# Patient Record
Sex: FEMALE | Race: WHITE | Employment: UNEMPLOYED | URBAN - METROPOLITAN AREA
[De-identification: names, ages, dates, MRNs, and addresses within clinical notes are randomized per-mention and may not be internally consistent; named-entity substitution may affect disease eponyms.]

---

## 2017-01-17 PROBLEM — O40.3XX0 POLYHYDRAMNIOS IN SINGLETON PREGNANCY IN THIRD TRIMESTER: Status: ACTIVE | Noted: 2017-01-17

## 2017-02-07 PROBLEM — O40.3XX0 POLYHYDRAMNIOS IN SINGLETON PREGNANCY IN THIRD TRIMESTER: Status: RESOLVED | Noted: 2017-01-17 | Resolved: 2017-02-07

## 2017-02-12 PROBLEM — Z37.9 NORMAL LABOR: Status: ACTIVE | Noted: 2017-02-12

## 2017-02-13 ENCOUNTER — ANESTHESIA EVENT (OUTPATIENT)
Dept: LABOR AND DELIVERY | Age: 27
End: 2017-02-13
Payer: COMMERCIAL

## 2017-02-13 ENCOUNTER — ANESTHESIA (OUTPATIENT)
Dept: LABOR AND DELIVERY | Age: 27
End: 2017-02-13
Payer: COMMERCIAL

## 2017-02-13 ENCOUNTER — SURGERY (OUTPATIENT)
Age: 27
End: 2017-02-13

## 2017-02-13 PROCEDURE — 74011250636 HC RX REV CODE- 250/636: Performed by: OBSTETRICS & GYNECOLOGY

## 2017-02-13 PROCEDURE — 74011250636 HC RX REV CODE- 250/636

## 2017-02-13 PROCEDURE — 74011000250 HC RX REV CODE- 250

## 2017-02-13 PROCEDURE — A4300 CATH IMPL VASC ACCESS PORTAL: HCPCS | Performed by: ANESTHESIOLOGY

## 2017-02-13 PROCEDURE — 77030014125 HC TY EPDRL BBMI -B: Performed by: ANESTHESIOLOGY

## 2017-02-13 RX ORDER — ONDANSETRON 2 MG/ML
INJECTION INTRAMUSCULAR; INTRAVENOUS AS NEEDED
Status: DISCONTINUED | OUTPATIENT
Start: 2017-02-13 | End: 2017-02-13 | Stop reason: HOSPADM

## 2017-02-13 RX ORDER — SODIUM BICARBONATE 1 MEQ/ML
SYRINGE (ML) INTRAVENOUS AS NEEDED
Status: DISCONTINUED | OUTPATIENT
Start: 2017-02-13 | End: 2017-02-13 | Stop reason: HOSPADM

## 2017-02-13 RX ORDER — LIDOCAINE HYDROCHLORIDE AND EPINEPHRINE 15; 5 MG/ML; UG/ML
INJECTION, SOLUTION EPIDURAL AS NEEDED
Status: DISCONTINUED | OUTPATIENT
Start: 2017-02-13 | End: 2017-02-13 | Stop reason: HOSPADM

## 2017-02-13 RX ORDER — ROPIVACAINE HYDROCHLORIDE 2 MG/ML
INJECTION, SOLUTION EPIDURAL; INFILTRATION; PERINEURAL
Status: DISCONTINUED | OUTPATIENT
Start: 2017-02-13 | End: 2017-02-13 | Stop reason: HOSPADM

## 2017-02-13 RX ORDER — MORPHINE SULFATE 0.5 MG/ML
INJECTION, SOLUTION EPIDURAL; INTRATHECAL; INTRAVENOUS AS NEEDED
Status: DISCONTINUED | OUTPATIENT
Start: 2017-02-13 | End: 2017-02-13 | Stop reason: HOSPADM

## 2017-02-13 RX ORDER — KETOROLAC TROMETHAMINE 30 MG/ML
INJECTION, SOLUTION INTRAMUSCULAR; INTRAVENOUS AS NEEDED
Status: DISCONTINUED | OUTPATIENT
Start: 2017-02-13 | End: 2017-02-13 | Stop reason: HOSPADM

## 2017-02-13 RX ORDER — ROPIVACAINE HYDROCHLORIDE 2 MG/ML
INJECTION, SOLUTION EPIDURAL; INFILTRATION; PERINEURAL AS NEEDED
Status: DISCONTINUED | OUTPATIENT
Start: 2017-02-13 | End: 2017-02-13 | Stop reason: HOSPADM

## 2017-02-13 RX ORDER — LIDOCAINE HYDROCHLORIDE AND EPINEPHRINE 20; 5 MG/ML; UG/ML
INJECTION, SOLUTION EPIDURAL; INFILTRATION; INTRACAUDAL; PERINEURAL AS NEEDED
Status: DISCONTINUED | OUTPATIENT
Start: 2017-02-13 | End: 2017-02-13 | Stop reason: HOSPADM

## 2017-02-13 RX ADMIN — CEFAZOLIN 2 G: 1 INJECTION, POWDER, FOR SOLUTION INTRAMUSCULAR; INTRAVENOUS; PARENTERAL at 19:41

## 2017-02-13 RX ADMIN — ROPIVACAINE HYDROCHLORIDE 10 ML/HR: 2 INJECTION, SOLUTION EPIDURAL; INFILTRATION; PERINEURAL at 11:15

## 2017-02-13 RX ADMIN — LIDOCAINE HYDROCHLORIDE AND EPINEPHRINE 3 ML: 15; 5 INJECTION, SOLUTION EPIDURAL at 11:07

## 2017-02-13 RX ADMIN — ONDANSETRON 4 MG: 2 INJECTION INTRAMUSCULAR; INTRAVENOUS at 20:01

## 2017-02-13 RX ADMIN — ROPIVACAINE HYDROCHLORIDE 4 ML: 2 INJECTION, SOLUTION EPIDURAL; INFILTRATION; PERINEURAL at 11:12

## 2017-02-13 RX ADMIN — KETOROLAC TROMETHAMINE 30 MG: 30 INJECTION, SOLUTION INTRAMUSCULAR; INTRAVENOUS at 20:15

## 2017-02-13 RX ADMIN — MORPHINE SULFATE 4 MG: 0.5 INJECTION, SOLUTION EPIDURAL; INTRATHECAL; INTRAVENOUS at 20:31

## 2017-02-13 RX ADMIN — Medication 2 MEQ: at 19:41

## 2017-02-13 RX ADMIN — OXYTOCIN 500 ML/HR: 10 INJECTION, SOLUTION INTRAMUSCULAR; INTRAVENOUS at 20:01

## 2017-02-13 RX ADMIN — LIDOCAINE HYDROCHLORIDE AND EPINEPHRINE 20 ML: 20; 5 INJECTION, SOLUTION EPIDURAL; INFILTRATION; INTRACAUDAL; PERINEURAL at 19:41

## 2017-02-13 RX ADMIN — ROPIVACAINE HYDROCHLORIDE 6 ML: 2 INJECTION, SOLUTION EPIDURAL; INFILTRATION; PERINEURAL at 11:09

## 2017-02-13 NOTE — ANESTHESIA PROCEDURE NOTES
Epidural Block    Start time: 2/13/2017 11:02 AM  End time: 2/13/2017 11:07 AM  Performed by: Cecy Romano  Authorized by: Ramsey MACIAS     Pre-Procedure  Indication: labor epidural    Preanesthetic Checklist: patient identified, risks and benefits discussed, anesthesia consent, site marked, patient being monitored, timeout performed and anesthesia consent    Timeout Time: 11:00        Epidural:   Patient position:  Seated  Prep region:  Lumbar  Prep: Patient draped and Chlorhexidine    Location:  L2-3    Needle and Epidural Catheter:   Needle Type:  Tuohy  Needle Gauge:  17 G  Injection Technique:  Loss of resistance using air  Attempts:  1  Catheter Size:  19 G  Catheter at Skin Depth (cm):  10  Depth in Epidural Space (cm):  5  Events: no blood with aspiration, no cerebrospinal fluid with aspiration, no paresthesia and negative aspiration test    Test Dose:  Lidocaine 1.5% w/ epi and negative    Assessment:   Catheter Secured:  Tegaderm and tape  Insertion:  Uncomplicated  Patient tolerance:  Patient tolerated the procedure well with no immediate complications

## 2017-02-13 NOTE — ANESTHESIA PREPROCEDURE EVALUATION
Anesthetic History   No history of anesthetic complications            Review of Systems / Medical History  Patient summary reviewed and pertinent labs reviewed    Pulmonary  Within defined limits                 Neuro/Psych   Within defined limits           Cardiovascular                  Exercise tolerance: >4 METS     GI/Hepatic/Renal     GERD           Endo/Other    Diabetes: using insulin         Other Findings   Comments: Intrauterine pregnancy         Physical Exam    Airway  Mallampati: II  TM Distance: < 4 cm  Neck ROM: short neck   Mouth opening: Normal     Cardiovascular    Rhythm: regular  Rate: normal         Dental  No notable dental hx       Pulmonary  Breath sounds clear to auscultation               Abdominal         Other Findings            Anesthetic Plan    ASA: 2, emergent  Anesthesia type: epidural            Anesthetic plan and risks discussed with: Patient and Spouse      Arrest of descent

## 2017-02-14 NOTE — ANESTHESIA POSTPROCEDURE EVALUATION
Post-Anesthesia Evaluation and Assessment    Patient: Sixto Villalobos MRN: [id number]  SSN: [id number]    YOB: 1990  Age: 32 y.o. Sex: female       Cardiovascular Function/Vital Signs  Visit Vitals    /64 (BP 1 Location: Right arm, BP Patient Position: At rest)    Pulse (!) 101    Temp 37.7 °C (99.8 °F)    Resp 16    Wt 92.1 kg (203 lb)    SpO2 93%    Breastfeeding Yes    BMI 31.79 kg/m2       Patient is status post epidural anesthesia for Procedure(s):   SECTION. Nausea/Vomiting: None    Postoperative hydration reviewed and adequate. Pain:  Pain Scale 1: Numeric (0 - 10) (17)  Pain Intensity 1: 3 (17)   Managed    Neurological Status:   Neuro (WDL): Exceptions to WDL (17)  Neuro  Neurologic State: Alert; Appropriate for age (17)  Orientation Level: Oriented X4 (17)  Cognition: Appropriate decision making; Appropriate for age attention/concentration; Appropriate safety awareness (17)  Speech: Clear (17)  LUE Motor Response: Purposeful (17)  LLE Motor Response: Tingling (17)  RUE Motor Response: Purposeful (17)  RLE Motor Response: Tingling (17)   Block resolving    Mental Status and Level of Consciousness: Alert and oriented     Pulmonary Status:   O2 Device: Room air (17)   Adequate oxygenation and airway patent    Complications related to anesthesia: None    Post-anesthesia assessment completed.  No concerns    Signed By: Chuy Brunson MD     2017

## 2022-03-19 PROBLEM — O40.3XX0 POLYHYDRAMNIOS IN THIRD TRIMESTER: Status: ACTIVE | Noted: 2017-01-17

## 2022-04-20 PROBLEM — O40.3XX0 POLYHYDRAMNIOS IN THIRD TRIMESTER: Status: RESOLVED | Noted: 2017-01-17 | Resolved: 2022-04-20

## 2022-05-03 ENCOUNTER — HOSPITAL ENCOUNTER (EMERGENCY)
Age: 32
Discharge: HOME | End: 2022-05-03
Attending: EMERGENCY MEDICINE
Payer: COMMERCIAL

## 2022-05-03 ENCOUNTER — APPOINTMENT (OUTPATIENT)
Dept: CT IMAGING | Age: 32
End: 2022-05-03
Attending: EMERGENCY MEDICINE
Payer: COMMERCIAL

## 2022-05-03 VITALS
BODY MASS INDEX: 34.67 KG/M2 | RESPIRATION RATE: 16 BRPM | OXYGEN SATURATION: 96 % | TEMPERATURE: 98.1 F | DIASTOLIC BLOOD PRESSURE: 59 MMHG | SYSTOLIC BLOOD PRESSURE: 121 MMHG | HEART RATE: 76 BPM | HEIGHT: 67 IN | WEIGHT: 220.9 LBS

## 2022-05-03 DIAGNOSIS — N23 RENAL COLIC: Primary | ICD-10-CM

## 2022-05-03 DIAGNOSIS — N13.9 ACUTE UNILATERAL OBSTRUCTIVE UROPATHY: ICD-10-CM

## 2022-05-03 LAB
ALBUMIN SERPL-MCNC: 3.7 G/DL (ref 3.5–5)
ALBUMIN/GLOB SERPL: 0.9 {RATIO} (ref 1.2–3.5)
ALP SERPL-CCNC: 74 U/L (ref 50–136)
ALT SERPL-CCNC: 32 U/L (ref 12–65)
ANION GAP SERPL CALC-SCNC: 10 MMOL/L (ref 7–16)
AST SERPL-CCNC: 14 U/L (ref 15–37)
BACTERIA URNS QL MICRO: 0 /HPF
BASOPHILS # BLD: 0.1 K/UL (ref 0–0.2)
BASOPHILS NFR BLD: 1 % (ref 0–2)
BILIRUB SERPL-MCNC: 0.4 MG/DL (ref 0.2–1.1)
BUN SERPL-MCNC: 19 MG/DL (ref 6–23)
CALCIUM SERPL-MCNC: 10 MG/DL (ref 8.3–10.4)
CASTS URNS QL MICRO: 0 /LPF
CHLORIDE SERPL-SCNC: 108 MMOL/L (ref 98–107)
CO2 SERPL-SCNC: 23 MMOL/L (ref 21–32)
CREAT SERPL-MCNC: 0.93 MG/DL (ref 0.6–1)
CRYSTALS URNS QL MICRO: 0 /LPF
DIFFERENTIAL METHOD BLD: ABNORMAL
EOSINOPHIL # BLD: 0.2 K/UL (ref 0–0.8)
EOSINOPHIL NFR BLD: 2 % (ref 0.5–7.8)
EPI CELLS #/AREA URNS HPF: NORMAL /HPF
ERYTHROCYTE [DISTWIDTH] IN BLOOD BY AUTOMATED COUNT: 12.6 % (ref 11.9–14.6)
GLOBULIN SER CALC-MCNC: 4 G/DL (ref 2.3–3.5)
GLUCOSE SERPL-MCNC: 153 MG/DL (ref 65–100)
HCG UR QL: NEGATIVE
HCT VFR BLD AUTO: 41.5 % (ref 35.8–46.3)
HGB BLD-MCNC: 14 G/DL (ref 11.7–15.4)
IMM GRANULOCYTES # BLD AUTO: 0 K/UL (ref 0–0.5)
IMM GRANULOCYTES NFR BLD AUTO: 0 % (ref 0–5)
LIPASE SERPL-CCNC: 110 U/L (ref 73–393)
LYMPHOCYTES # BLD: 1.5 K/UL (ref 0.5–4.6)
LYMPHOCYTES NFR BLD: 14 % (ref 13–44)
MCH RBC QN AUTO: 28.7 PG (ref 26.1–32.9)
MCHC RBC AUTO-ENTMCNC: 33.7 G/DL (ref 31.4–35)
MCV RBC AUTO: 85.2 FL (ref 79.6–97.8)
MONOCYTES # BLD: 0.7 K/UL (ref 0.1–1.3)
MONOCYTES NFR BLD: 7 % (ref 4–12)
MUCOUS THREADS URNS QL MICRO: 0 /LPF
NEUTS SEG # BLD: 8.3 K/UL (ref 1.7–8.2)
NEUTS SEG NFR BLD: 77 % (ref 43–78)
NRBC # BLD: 0 K/UL (ref 0–0.2)
PLATELET # BLD AUTO: 238 K/UL (ref 150–450)
PMV BLD AUTO: 11.6 FL (ref 9.4–12.3)
POTASSIUM SERPL-SCNC: 3.7 MMOL/L (ref 3.5–5.1)
PROT SERPL-MCNC: 7.7 G/DL (ref 6.3–8.2)
RBC # BLD AUTO: 4.87 M/UL (ref 4.05–5.2)
RBC #/AREA URNS HPF: NORMAL /HPF
SODIUM SERPL-SCNC: 141 MMOL/L (ref 136–145)
WBC # BLD AUTO: 10.8 K/UL (ref 4.3–11.1)
WBC URNS QL MICRO: NORMAL /HPF

## 2022-05-03 PROCEDURE — 83690 ASSAY OF LIPASE: CPT

## 2022-05-03 PROCEDURE — 96375 TX/PRO/DX INJ NEW DRUG ADDON: CPT

## 2022-05-03 PROCEDURE — 74011250637 HC RX REV CODE- 250/637: Performed by: EMERGENCY MEDICINE

## 2022-05-03 PROCEDURE — 81015 MICROSCOPIC EXAM OF URINE: CPT

## 2022-05-03 PROCEDURE — 81003 URINALYSIS AUTO W/O SCOPE: CPT

## 2022-05-03 PROCEDURE — 81025 URINE PREGNANCY TEST: CPT

## 2022-05-03 PROCEDURE — 74011250636 HC RX REV CODE- 250/636: Performed by: EMERGENCY MEDICINE

## 2022-05-03 PROCEDURE — 96374 THER/PROPH/DIAG INJ IV PUSH: CPT

## 2022-05-03 PROCEDURE — 85025 COMPLETE CBC W/AUTO DIFF WBC: CPT

## 2022-05-03 PROCEDURE — 74176 CT ABD & PELVIS W/O CONTRAST: CPT

## 2022-05-03 PROCEDURE — 99284 EMERGENCY DEPT VISIT MOD MDM: CPT

## 2022-05-03 PROCEDURE — 80053 COMPREHEN METABOLIC PANEL: CPT

## 2022-05-03 RX ORDER — TAMSULOSIN HYDROCHLORIDE 0.4 MG/1
0.4 CAPSULE ORAL
Status: COMPLETED | OUTPATIENT
Start: 2022-05-03 | End: 2022-05-03

## 2022-05-03 RX ORDER — KETOROLAC TROMETHAMINE 30 MG/ML
30 INJECTION, SOLUTION INTRAMUSCULAR; INTRAVENOUS
Status: COMPLETED | OUTPATIENT
Start: 2022-05-03 | End: 2022-05-03

## 2022-05-03 RX ORDER — SODIUM CHLORIDE 0.9 % (FLUSH) 0.9 %
5-10 SYRINGE (ML) INJECTION AS NEEDED
Status: DISCONTINUED | OUTPATIENT
Start: 2022-05-03 | End: 2022-05-03 | Stop reason: HOSPADM

## 2022-05-03 RX ORDER — SODIUM CHLORIDE 0.9 % (FLUSH) 0.9 %
5-10 SYRINGE (ML) INJECTION EVERY 8 HOURS
Status: DISCONTINUED | OUTPATIENT
Start: 2022-05-03 | End: 2022-05-03 | Stop reason: HOSPADM

## 2022-05-03 RX ORDER — TAMSULOSIN HYDROCHLORIDE 0.4 MG/1
0.4 CAPSULE ORAL DAILY
Qty: 15 CAPSULE | Refills: 0 | Status: SHIPPED | OUTPATIENT
Start: 2022-05-03 | End: 2022-05-19

## 2022-05-03 RX ORDER — ONDANSETRON HYDROCHLORIDE 8 MG/1
8 TABLET, FILM COATED ORAL
Qty: 20 TABLET | Refills: 0 | Status: SHIPPED | OUTPATIENT
Start: 2022-05-03

## 2022-05-03 RX ORDER — KETOROLAC TROMETHAMINE 10 MG/1
10 TABLET, FILM COATED ORAL
Qty: 30 TABLET | Refills: 0 | Status: SHIPPED | OUTPATIENT
Start: 2022-05-03 | End: 2022-05-19

## 2022-05-03 RX ORDER — ONDANSETRON 2 MG/ML
8 INJECTION INTRAMUSCULAR; INTRAVENOUS
Status: COMPLETED | OUTPATIENT
Start: 2022-05-03 | End: 2022-05-03

## 2022-05-03 RX ADMIN — KETOROLAC TROMETHAMINE 30 MG: 30 INJECTION, SOLUTION INTRAMUSCULAR at 06:00

## 2022-05-03 RX ADMIN — SODIUM CHLORIDE 1000 ML: 900 INJECTION, SOLUTION INTRAVENOUS at 06:00

## 2022-05-03 RX ADMIN — TAMSULOSIN HYDROCHLORIDE 0.4 MG: 0.4 CAPSULE ORAL at 06:31

## 2022-05-03 RX ADMIN — ONDANSETRON 8 MG: 2 INJECTION INTRAMUSCULAR; INTRAVENOUS at 06:04

## 2022-05-03 NOTE — DISCHARGE INSTRUCTIONS
Follow-up with urology. Call to set up appointment. Return to the emergency department for fevers, uncontrolled pain, uncontrolled vomiting or any other concerns.

## 2022-05-03 NOTE — ED NOTES
I have reviewed discharge instructions with the patient. The patient verbalized understanding. Patient left ED via Discharge Method: ambulatory to Home with family. Opportunity for questions and clarification provided. Patient given 3 scripts. To continue your aftercare when you leave the hospital, you may receive an automated call from our care team to check in on how you are doing. This is a free service and part of our promise to provide the best care and service to meet your aftercare needs.  If you have questions, or wish to unsubscribe from this service please call [phone]. Thank you for Choosing our [facility] Emergency Department.

## 2022-05-03 NOTE — ED TRIAGE NOTES
Pt states that she woke up tonight with left flank pain. States that she was recently treated for a UTI. Also, c/o nausea nand vomiting.   Masked on arrival

## 2022-05-03 NOTE — ED PROVIDER NOTES
Zoë JoinerMarroquincoy Paredes is a 28 y.o. female seen on 5/3/2022 in the [address] [place] [address] in room ER02/02. Chief Complaint   Patient presents with    Flank Pain     HPI: 35-year-old  female presented to the emergency department with complaints of left flank pain, nausea and vomiting that awoke her from her sleep in the middle of the night. Patient states that she has not had pain like this before. Patient denies any urinary symptoms however states that she was treated for urinary tract infection about 3 weeks ago. She denies any fevers, chills, chest pain, shortness of breath, ranges in bowel habits or any other concerns. Patient states that there is nothing that makes the pain worse or better. She states that she cannot find a position of comfort. patient states that she felt fine prior to going to bed. .    Historian: Patient     REVIEW OF SYSTEMS     Review of Systems   Constitutional: Negative. HENT: Negative. Respiratory: Negative. Cardiovascular: Negative. Gastrointestinal: Positive for abdominal pain, nausea and vomiting. Genitourinary: Positive for flank pain. Skin: Negative. Neurological: Negative. Psychiatric/Behavioral: Negative. All other systems reviewed and are negative.       PAST MEDICAL HISTORY     Past Medical History:   Diagnosis Date    Anemia 2009    in pregnancy    History of gestational diabetes 2016    Infertility associated with anovulation     Irregular menstrual bleeding     PCOS (polycystic ovarian syndrome)     Type 2 diabetes mellitus (HCC)      Past Surgical History:   Procedure Laterality Date    HX  SECTION      HX ORTHOPAEDIC Left     Knee     Social History     Socioeconomic History    Marital status: SINGLE   Tobacco Use    Smoking status: Never Smoker    Smokeless tobacco: Never Used   Vaping Use    Vaping Use: Never used   Substance and Sexual Activity    Alcohol use: No    Drug use: No    Sexual activity: Yes     Partners: Male     Birth control/protection: None     Prior to Admission Medications   Prescriptions Last Dose Informant Patient Reported? Taking? Blood-Glucose Meter monitoring kit   No No   Sig: Check blood sugar fasting before breakfast then 1 hour after the first bite of each meal (breakfast,lunch,dinner)   glucose blood VI test strips (blood glucose test) strip   No No   Sig: Check blood sugar fasting before breakfast then 1 hour after the first bite of each meal (breakfast,lunch,dinner)   lancets misc   No No   Sig: Check blood sugar fasting before breakfast then 1 hour after the first bite of each meal (breakfast,lunch,dinner)   metFORMIN ER (GLUCOPHAGE XR) 500 mg tablet   No No   Sig: Take 1 Tablet by mouth daily (with dinner). Facility-Administered Medications: None     Allergies   Allergen Reactions    Sulfa (Sulfonamide Antibiotics) Rash        PHYSICAL EXAM       Vitals:    05/03/22 0524 05/03/22 0529   BP: 121/80 126/77   Pulse:  74   Resp:  16   Temp:  98.1 °F (36.7 °C)   SpO2:  98%    Vital signs were reviewed. Physical Exam  Vitals and nursing note reviewed. Constitutional:       General: She is in acute distress (Obvious discomfort). Appearance: Normal appearance. HENT:      Head: Normocephalic and atraumatic. Mouth/Throat:      Mouth: Mucous membranes are moist.   Eyes:      Extraocular Movements: Extraocular movements intact. Pupils: Pupils are equal, round, and reactive to light. Cardiovascular:      Rate and Rhythm: Normal rate and regular rhythm. Pulses: Normal pulses. Heart sounds: Normal heart sounds. Pulmonary:      Effort: Pulmonary effort is normal.      Breath sounds: Normal breath sounds. Abdominal:      Palpations: Abdomen is soft. Tenderness: There is abdominal tenderness (Mild left lower quadrant tenderness to palpation). There is left CVA tenderness.    Musculoskeletal:         General: Normal range of motion. Cervical back: Normal range of motion. Skin:     General: Skin is warm and dry. Neurological:      General: No focal deficit present. Mental Status: She is alert and oriented to person, place, and time. Mental status is at baseline. Psychiatric:         Mood and Affect: Mood normal.         Behavior: Behavior normal.         Thought Content:  Thought content normal.         Judgment: Judgment normal.          MEDICAL DECISION MAKING     ED Course:    Orders Placed This Encounter    CT ABD/PEL FOR RENAL STONE    CBC with Diff    CMP    Lipase    Urine Micro (Check if Urine Dip is Positive)    DIET NPO    POC Urine Dipstick    POC Urine Preg (Check for Females 10 - 55)    HCG URINE, QL. - POC    SALINE LOCK IV ONE TIME Routine    sodium chloride (NS) flush 5-10 mL    sodium chloride (NS) flush 5-10 mL    sodium chloride 0.9 % bolus infusion 1,000 mL    ondansetron (ZOFRAN) injection 8 mg    ketorolac (TORADOL) injection 30 mg    tamsulosin (FLOMAX) capsule 0.4 mg    ketorolac (TORADOL) 10 mg tablet    ondansetron hcl (Zofran) 8 mg tablet    tamsulosin (Flomax) 0.4 mg capsule     Recent Results (from the past 8 hour(s))   HCG URINE, QL. - POC    Collection Time: 05/03/22  5:16 AM   Result Value Ref Range    Pregnancy test,urine (POC) Negative NEG     URINE MICROSCOPIC    Collection Time: 05/03/22  5:18 AM   Result Value Ref Range    WBC 3-5 0 /hpf    RBC 3-5 0 /hpf    Epithelial cells 3-5 0 /hpf    Bacteria 0 0 /hpf    Casts 0 0 /lpf    Crystals, urine 0 0 /LPF    Mucus 0 0 /lpf   CBC WITH AUTOMATED DIFF    Collection Time: 05/03/22  5:58 AM   Result Value Ref Range    WBC 10.8 4.3 - 11.1 K/uL    RBC 4.87 4.05 - 5.2 M/uL    HGB 14.0 11.7 - 15.4 g/dL    HCT 41.5 35.8 - 46.3 %    MCV 85.2 79.6 - 97.8 FL    MCH 28.7 26.1 - 32.9 PG    MCHC 33.7 31.4 - 35.0 g/dL    RDW 12.6 11.9 - 14.6 %    PLATELET 992 624 - 479 K/uL    MPV 11.6 9.4 - 12.3 FL    ABSOLUTE NRBC 0.00 0.0 - 0.2 K/uL    DF AUTOMATED      NEUTROPHILS 77 43 - 78 %    LYMPHOCYTES 14 13 - 44 %    MONOCYTES 7 4.0 - 12.0 %    EOSINOPHILS 2 0.5 - 7.8 %    BASOPHILS 1 0.0 - 2.0 %    IMMATURE GRANULOCYTES 0 0.0 - 5.0 %    ABS. NEUTROPHILS 8.3 (H) 1.7 - 8.2 K/UL    ABS. LYMPHOCYTES 1.5 0.5 - 4.6 K/UL    ABS. MONOCYTES 0.7 0.1 - 1.3 K/UL    ABS. EOSINOPHILS 0.2 0.0 - 0.8 K/UL    ABS. BASOPHILS 0.1 0.0 - 0.2 K/UL    ABS. IMM. GRANS. 0.0 0.0 - 0.5 K/UL   METABOLIC PANEL, COMPREHENSIVE    Collection Time: 05/03/22  5:58 AM   Result Value Ref Range    Sodium 141 136 - 145 mmol/L    Potassium 3.7 3.5 - 5.1 mmol/L    Chloride 108 (H) 98 - 107 mmol/L    CO2 23 21 - 32 mmol/L    Anion gap 10 7 - 16 mmol/L    Glucose 153 (H) 65 - 100 mg/dL    BUN 19 6 - 23 MG/DL    Creatinine 0.93 0.6 - 1.0 MG/DL    GFR est AA >60 >60 ml/min/1.73m2    GFR est non-AA >60 >60 ml/min/1.73m2    Calcium 10.0 8.3 - 10.4 MG/DL    Bilirubin, total 0.4 0.2 - 1.1 MG/DL    ALT (SGPT) 32 12 - 65 U/L    AST (SGOT) 14 (L) 15 - 37 U/L    Alk. phosphatase 74 50 - 136 U/L    Protein, total 7.7 6.3 - 8.2 g/dL    Albumin 3.7 3.5 - 5.0 g/dL    Globulin 4.0 (H) 2.3 - 3.5 g/dL    A-G Ratio 0.9 (L) 1.2 - 3.5     LIPASE    Collection Time: 05/03/22  5:58 AM   Result Value Ref Range    Lipase 110 73 - 393 U/L     CT ABD/PEL FOR RENAL STONE    Result Date: 5/3/2022  Clinical history: Left flank pain. TECHNIQUE: Axial, coronal and sagittal CT of the abdomen/pelvis without IV contrast. Radiation dose reduction techniques were used for this study:  Our CT scanners use one or all of the following: Automated exposure control, adjustment of the mA and/or kVp according to patient's size, iterative reconstruction. COMPARISON: None. FINDINGS: Absence of IV contrast limits sensitivity. There is mild left hydronephrosis due to a 4 mm calculus at the ureteropelvic junction. A couple of additional small nonobstructing left renal calculi, measuring 4 mm each.  Two small nonobstructing right renal calculi. No right hydronephrosis. No perinephric fluid collection. The liver and the spleen are normal in size. No radiopaque gallstone. No peripancreatic fluid collection. Unenhanced adrenal glands and abdominal aorta are unremarkable. Stomach is normal in contour. Small bowel loops are normal in caliber. No small bowel obstruction. No evidence of lymphadenopathy. Incidental note is made of retroaortic left renal vein. Pelvic findings: Uterus is not well evaluated. Urinary bladder is underdistended. The colon is normal in course and caliber. Appendix is normal. There is no free air or free fluid. Surrounding bones are intact. 1. Mild left hydronephrosis, because of a 4 mm calculus at the ureteropelvic junction. 2. Additional bilateral small nonobstructing renal calculi. 3. Negative for appendicitis, colitis, diverticulitis or bowel obstruction. ED Course as of 05/03/22 0653   Tue May 03, 2022   9919 Patient's pain completely resolved after Toradol. [JL]   [id number] Discussed with Dr. Lauren Stephens with urology. Patient will follow-up in the office. [JL]      ED Course User Index  [JL] Suhail Larios,      MDM  Number of Diagnoses or Management Options  Acute unilateral obstructive uropathy  Renal colic  Diagnosis management comments: 43-year-old female presented to the emergency department with sudden onset left flank pain that woke her from her sleep. Patient's labs are reassuring. Patient with 5 to 6 mm obstructing UPJ stone noted on CT scan. Patient is pain-free at this time. Discussed with urology and patient will follow-up as an outpatient in the office. Patient instructed to return to the emergency department for uncontrolled pain, uncontrolled vomiting, fever or any other concerns. Should be given medications for pain and nausea.        Amount and/or Complexity of Data Reviewed  Clinical lab tests: ordered and reviewed  Tests in the radiology section of CPT®: ordered and reviewed  Decide to obtain previous medical records or to obtain history from someone other than the patient: yes  Review and summarize past medical records: yes  Discuss the patient with other providers: yes  Independent visualization of images, tracings, or specimens: yes          Disposition:  Discharged  Diagnosis:     ICD-10-CM ICD-9-CM   1. Renal colic  A31 056.5   2. Acute unilateral obstructive uropathy  N13.9 593.4     ____________________________________________________________________  A portion of this note was generated using voice recognition dictation software. While the note has been reviewed for accuracy, please note certain words and phrases may not be transcribed as intended and some grammatical and/or typographical errors may be present.

## 2022-05-10 ENCOUNTER — ANESTHESIA EVENT (OUTPATIENT)
Dept: SURGERY | Age: 32
End: 2022-05-10
Payer: COMMERCIAL

## 2022-05-10 ENCOUNTER — HOSPITAL ENCOUNTER (OUTPATIENT)
Age: 32
Setting detail: OUTPATIENT SURGERY
Discharge: HOME | End: 2022-05-10
Attending: UROLOGY | Admitting: UROLOGY
Payer: COMMERCIAL

## 2022-05-10 ENCOUNTER — ANESTHESIA (OUTPATIENT)
Dept: SURGERY | Age: 32
End: 2022-05-10
Payer: COMMERCIAL

## 2022-05-10 VITALS
TEMPERATURE: 98 F | HEIGHT: 66 IN | SYSTOLIC BLOOD PRESSURE: 121 MMHG | BODY MASS INDEX: 34.27 KG/M2 | RESPIRATION RATE: 15 BRPM | WEIGHT: 213.2 LBS | HEART RATE: 46 BPM | DIASTOLIC BLOOD PRESSURE: 64 MMHG | OXYGEN SATURATION: 99 %

## 2022-05-10 DIAGNOSIS — N20.1 URETERAL STONE: Primary | ICD-10-CM

## 2022-05-10 LAB
APPEARANCE UR: CLEAR
BACTERIA URNS QL MICRO: ABNORMAL /HPF
BILIRUB UR QL: NEGATIVE
CASTS URNS QL MICRO: 0 /LPF
COLOR UR: YELLOW
CRYSTALS URNS QL MICRO: 0 /LPF
EPI CELLS #/AREA URNS HPF: ABNORMAL /HPF
GLUCOSE BLD STRIP.AUTO-MCNC: 96 MG/DL (ref 65–100)
GLUCOSE UR STRIP.AUTO-MCNC: NEGATIVE MG/DL
HCG SERPL QL: NEGATIVE
HGB UR QL STRIP: ABNORMAL
KETONES UR QL STRIP.AUTO: NEGATIVE MG/DL
LEUKOCYTE ESTERASE UR QL STRIP.AUTO: NEGATIVE
MUCOUS THREADS URNS QL MICRO: ABNORMAL /LPF
NITRITE UR QL STRIP.AUTO: NEGATIVE
OTHER OBSERVATIONS,UCOM: ABNORMAL
PH UR STRIP: 6 [PH] (ref 5–9)
PROT UR STRIP-MCNC: NEGATIVE MG/DL
RBC #/AREA URNS HPF: ABNORMAL /HPF
SERVICE CMNT-IMP: NORMAL
SP GR UR REFRACTOMETRY: 1.02 (ref 1–1.02)
UROBILINOGEN UR QL STRIP.AUTO: 0.2 EU/DL (ref 0.2–1)
WBC URNS QL MICRO: ABNORMAL /HPF

## 2022-05-10 PROCEDURE — 76010000138 HC OR TIME 0.5 TO 1 HR: Performed by: UROLOGY

## 2022-05-10 PROCEDURE — 50590 FRAGMENTING OF KIDNEY STONE: CPT | Performed by: UROLOGY

## 2022-05-10 PROCEDURE — 77030040361 HC SLV COMPR DVT MDII -B: Performed by: UROLOGY

## 2022-05-10 PROCEDURE — 76210000006 HC OR PH I REC 0.5 TO 1 HR: Performed by: UROLOGY

## 2022-05-10 PROCEDURE — 81025 URINE PREGNANCY TEST: CPT

## 2022-05-10 PROCEDURE — 77030010509 HC AIRWY LMA MSK TELE -A: Performed by: NURSE ANESTHETIST, CERTIFIED REGISTERED

## 2022-05-10 PROCEDURE — 76060000032 HC ANESTHESIA 0.5 TO 1 HR: Performed by: UROLOGY

## 2022-05-10 PROCEDURE — 82962 GLUCOSE BLOOD TEST: CPT

## 2022-05-10 PROCEDURE — 74011000250 HC RX REV CODE- 250: Performed by: NURSE ANESTHETIST, CERTIFIED REGISTERED

## 2022-05-10 PROCEDURE — 74011250636 HC RX REV CODE- 250/636: Performed by: UROLOGY

## 2022-05-10 PROCEDURE — 74011250636 HC RX REV CODE- 250/636: Performed by: NURSE ANESTHETIST, CERTIFIED REGISTERED

## 2022-05-10 PROCEDURE — 76210000020 HC REC RM PH II FIRST 0.5 HR: Performed by: UROLOGY

## 2022-05-10 PROCEDURE — 84703 CHORIONIC GONADOTROPIN ASSAY: CPT

## 2022-05-10 PROCEDURE — 81003 URINALYSIS AUTO W/O SCOPE: CPT

## 2022-05-10 PROCEDURE — 81015 MICROSCOPIC EXAM OF URINE: CPT

## 2022-05-10 RX ORDER — HYDROCODONE BITARTRATE AND ACETAMINOPHEN 5; 325 MG/1; MG/1
1 TABLET ORAL
Qty: 15 TABLET | Refills: 0 | Status: SHIPPED | OUTPATIENT
Start: 2022-05-10 | End: 2022-05-15

## 2022-05-10 RX ORDER — ONDANSETRON 2 MG/ML
INJECTION INTRAMUSCULAR; INTRAVENOUS AS NEEDED
Status: DISCONTINUED | OUTPATIENT
Start: 2022-05-10 | End: 2022-05-10 | Stop reason: HOSPADM

## 2022-05-10 RX ORDER — SODIUM CHLORIDE, SODIUM LACTATE, POTASSIUM CHLORIDE, CALCIUM CHLORIDE 600; 310; 30; 20 MG/100ML; MG/100ML; MG/100ML; MG/100ML
25 INJECTION, SOLUTION INTRAVENOUS CONTINUOUS
Status: DISCONTINUED | OUTPATIENT
Start: 2022-05-10 | End: 2022-05-10 | Stop reason: HOSPADM

## 2022-05-10 RX ORDER — PROPOFOL 10 MG/ML
INJECTION, EMULSION INTRAVENOUS AS NEEDED
Status: DISCONTINUED | OUTPATIENT
Start: 2022-05-10 | End: 2022-05-10 | Stop reason: HOSPADM

## 2022-05-10 RX ORDER — CEFAZOLIN SODIUM/WATER 2 G/20 ML
2 SYRINGE (ML) INTRAVENOUS ONCE
Status: COMPLETED | OUTPATIENT
Start: 2022-05-10 | End: 2022-05-10

## 2022-05-10 RX ORDER — DEXAMETHASONE SODIUM PHOSPHATE 4 MG/ML
INJECTION, SOLUTION INTRA-ARTICULAR; INTRALESIONAL; INTRAMUSCULAR; INTRAVENOUS; SOFT TISSUE AS NEEDED
Status: DISCONTINUED | OUTPATIENT
Start: 2022-05-10 | End: 2022-05-10 | Stop reason: HOSPADM

## 2022-05-10 RX ORDER — LIDOCAINE HYDROCHLORIDE 20 MG/ML
INJECTION, SOLUTION EPIDURAL; INFILTRATION; INTRACAUDAL; PERINEURAL AS NEEDED
Status: DISCONTINUED | OUTPATIENT
Start: 2022-05-10 | End: 2022-05-10 | Stop reason: HOSPADM

## 2022-05-10 RX ADMIN — PROPOFOL 200 MG: 10 INJECTION, EMULSION INTRAVENOUS at 14:21

## 2022-05-10 RX ADMIN — DEXAMETHASONE SODIUM PHOSPHATE 4 MG: 4 INJECTION, SOLUTION INTRAMUSCULAR; INTRAVENOUS at 14:48

## 2022-05-10 RX ADMIN — SODIUM CHLORIDE, SODIUM LACTATE, POTASSIUM CHLORIDE, AND CALCIUM CHLORIDE: 600; 310; 30; 20 INJECTION, SOLUTION INTRAVENOUS at 14:22

## 2022-05-10 RX ADMIN — SODIUM CHLORIDE, SODIUM LACTATE, POTASSIUM CHLORIDE, AND CALCIUM CHLORIDE 25 ML/HR: 600; 310; 30; 20 INJECTION, SOLUTION INTRAVENOUS at 13:35

## 2022-05-10 RX ADMIN — Medication 2 G: at 14:17

## 2022-05-10 RX ADMIN — ONDANSETRON 4 MG: 2 INJECTION INTRAMUSCULAR; INTRAVENOUS at 14:48

## 2022-05-10 RX ADMIN — LIDOCAINE HYDROCHLORIDE 100 MG: 20 INJECTION, SOLUTION EPIDURAL; INFILTRATION; INTRACAUDAL; PERINEURAL at 14:21

## 2022-05-10 NOTE — H&P
William Sequeira  : 1990         Chief Complaint   Patient presents with   Radha ROPER      William Sequeira is a 28 y.o. female referred for ER follow up 22. Reports L flank pain and fever 3/22. Seen at urgent care. She reports xray was done and she was told she had no stones. Dx w L pyelonephritis and treated w antibiotics. Sx improved. I do not have these records.     She reports abrupt onset of L flank pain 5/3/22 (woke her from her sleep). She presented to [facility] ER. No prior h/o renal stones. Both mom and dad have long h/o renal stones. CT urogram showed L UPJ stone w mild obstruction. Add B renal stones noted. Images reviewed personally. sCr 0.93. Urine micro w hematuria but no bacteria.     KUB 22 showed L UPJ stone. She opted for MET. Returns early today for cont L flank pain. She wants to consider surgical intervention.             Past Medical History:   Diagnosis Date    Anemia      in pregnancy    History of gestational diabetes 2016    Infertility associated with anovulation      Irregular menstrual bleeding      PCOS (polycystic ovarian syndrome)      Type 2 diabetes mellitus (HCC)              Past Surgical History:   Procedure Laterality Date    HX  SECTION        HX ORTHOPAEDIC Left       Knee             Current Outpatient Medications   Medication Sig Dispense Refill    ketorolac (TORADOL) 10 mg tablet Take 1 Tablet by mouth every six (6) hours as needed for Pain. 30 Tablet 0    ondansetron hcl (Zofran) 8 mg tablet Take 1 Tablet by mouth every eight (8) hours as needed for Nausea, Vomiting or Nausea or Vomiting. 20 Tablet 0    tamsulosin (Flomax) 0.4 mg capsule Take 1 Capsule by mouth daily for 15 days. 15 Capsule 0    metFORMIN ER (GLUCOPHAGE XR) 500 mg tablet Take 1 Tablet by mouth daily (with dinner).  30 Tablet 1    Blood-Glucose Meter monitoring kit Check blood sugar fasting before breakfast then 1 hour after the first bite of each meal (breakfast,lunch,dinner) 1 Kit 0    glucose blood VI test strips (blood glucose test) strip Check blood sugar fasting before breakfast then 1 hour after the first bite of each meal (breakfast,lunch,dinner) 120 Strip 1    lancets misc Check blood sugar fasting before breakfast then 1 hour after the first bite of each meal (breakfast,lunch,dinner) 120 Each 1           Allergies   Allergen Reactions    Sulfa (Sulfonamide Antibiotics) Rash      Social History            Socioeconomic History    Marital status: SINGLE       Spouse name: Not on file    Number of children: Not on file    Years of education: Not on file    Highest education level: Not on file   Occupational History    Not on file   Tobacco Use    Smoking status: Never Smoker    Smokeless tobacco: Never Used   Vaping Use    Vaping Use: Never used   Substance and Sexual Activity    Alcohol use: No    Drug use: No    Sexual activity: Yes       Partners: Male       Birth control/protection: None   Other Topics Concern    Not on file   Social History Narrative    Not on file      Social Determinants of Health          Financial Resource Strain:     Difficulty of Paying Living Expenses: Not on file   Food Insecurity:     Worried About Running Out of Food in the Last Year: Not on file    Nicolasa of Food in the Last Year: Not on file   Transportation Needs:     Lack of Transportation (Medical): Not on file    Lack of Transportation (Non-Medical):  Not on file   Physical Activity:     Days of Exercise per Week: Not on file    Minutes of Exercise per Session: Not on file   Stress:     Feeling of Stress : Not on file   Social Connections:     Frequency of Communication with Friends and Family: Not on file    Frequency of Social Gatherings with Friends and Family: Not on file    Attends Hinduism Services: Not on file    Active Member of Clubs or Organizations: Not on file    Attends Club or Organization Meetings: Not on file   Luz Belcher Marital Status: Not on file   Intimate Partner Violence:     Fear of Current or Ex-Partner: Not on file    Emotionally Abused: Not on file    Physically Abused: Not on file    Sexually Abused: Not on file   Housing Stability:     Unable to Pay for Housing in the Last Year: Not on file    Number of [place] in the Last Year: Not on file    Unstable Housing in the Last Year: Not on file            Family History   Problem Relation Age of Onset    Diabetes Father      Cancer Paternal Grandmother      Breast Cancer Paternal Grandmother      Diabetes Paternal Grandfather      Colon Cancer Neg Hx      Ovarian Cancer Neg Hx           Review of Systems  Constitutional:   Negative for fever. GI:  Negative for abdominal pain. Genitourinary: Positive for flank pain and history of urolithiasis. Musculoskeletal:  Negative for back pain.           PHYSICAL EXAM     General appearance - well appearing and in no distress  Mental status - alert, oriented to person, place, and time  Neck - supple, no significant adenopathy   Heart-  Normal S1/S2, No murmurs  Chest/Lung-  Quiet, even and easy respiratory effort without use of accessory muscles, BS clear all lung fields  Skin - normal coloration and turgor, no rashes        KUB in office today reviewed by myself and shows L UPJ stone.        Assessment and Plan      ICD-10-CM ICD-9-CM     1. Left ureteral stone  N20.1 592.1 AMB POC XRAY ABDOMEN 1 VIEW         AMB POC URINALYSIS DIP STICK AUTO W/O MICRO (PGU)   2. Kidney stone  N20.0 592.0           KUB reviewed w patient. After our discussion, the patient would like to proceed with left ESWL.   Risks of ESWL that we discussed were bleeding, infection, flank pain, bruising, renal hematoma, injury to surrounding structures, incomplete fragmentation of stones, steinstrasse, inability to pass stone fragments requiring additional operative intervention in the form of ureteroscopy/laser lithotripsy and/or stent placement, renal failure, hypertension, risks of general anesthesia, recurrent stones. The patient expressed understanding of the above. Provided rx for phenergan 12.5 mg PO q 6 hr PRN nausea.     Will follow up after procedure. Advised to call sooner if needed.

## 2022-05-10 NOTE — BRIEF OP NOTE
Brief Postoperative Note    Patient: Jose Haley  YOB: 1990  MRN: [id number]    Date of Procedure: 5/10/2022     Pre-Op Diagnosis: Ureteral stone [N20.1]    Post-Op Diagnosis: Same as preoperative diagnosis.       Procedure(s):  LITHOTRIPSY EXTRACORPOREAL SHOCKWAVE ESWL/ LEFT    Surgeon(s):  Marin Shaikh MD    Surgical Assistant: None    Anesthesia: General     Estimated Blood Loss (mL): Minimal    Complications: None    Specimens: * No specimens in log *     Implants: * No implants in log *    Drains: * No LDAs found *    Findings: see op note      Electronically Signed by Severo Bream, MD on 5/10/2022 at 3:00 PM

## 2022-05-10 NOTE — DISCHARGE INSTRUCTIONS
ACTIVITY  · As tolerated and as directed by your doctor. · Walking and mild exercise help to pass the stone fragments. DIET  · Clear liquids until no nausea and vomiting; then resume light diet for the first day  · Advance to regular diet on second day, unless your doctor orders otherwise. · If nauseated and/ or vomiting, call your doctor. · Drink at least 8 glasses of water a day (avoid caffeinated beverages). PAIN  · Take pain medication as directed. · Call your doctor if pain is NOT relieved by medication. · DO NOT take aspirin or blood thinners until directed by your doctor. MEDICATION INTERACTION:  During your procedure you potentially received a medication or medications which may reduce the effectiveness of oral contraceptives. Please consider other forms of contraception for 1 month following your procedure if you are currently using oral contraceptives as your primary form of birth control. In addition to this, we recommend continuing your oral contraceptive as prescribed, unless otherwise instructed by your physician, during this time. CALL YOUR DOCTOR IF   · Expect blood-tinged urine. Call your doctor if it lasts more than 72 hours or if you cannot see through the urine. · Aches, chills, or fever of 101 degree F or above    Lithotripsy does not make your stone disappear. The treatment is meant to crush your stone so that the fragments can be passed. Strain your urine, save any fragments, and take them to your doctor. The fragments may not begin to pass for up to 1-2 months. You may experience slight bruising at the treated site.      After general anesthesia or intravenous sedation, for 24 hours or while taking prescription Narcotics:  · Limit your activities  · A responsible adult needs to be with you for the next 24 hours  · Do not drive and operate hazardous machinery  · Do not make important personal or business decisions  · Do not drink alcoholic beverages  · If you have not urinated within 8 hours after discharge, and you are experiencing discomfort from urinary retention, please go to the nearest ED. · If you have sleep apnea and have a CPAP machine, please use it for all naps and sleeping. · Please use caution when taking narcotics and any of your home medications that may cause drowsiness. *  Please give a list of your current medications to your Primary Care Provider. *  Please update this list whenever your medications are discontinued, doses are      changed, or new medications (including over-the-counter products) are added. *  Please carry medication information at all times in case of emergency situations. These are general instructions for a healthy lifestyle:  No smoking/ No tobacco products/ Avoid exposure to second hand smoke  Surgeon General's Warning:  Quitting smoking now greatly reduces serious risk to your health. Obesity, smoking, and sedentary lifestyle greatly increases your risk for illness  A healthy diet, regular physical exercise & weight monitoring are important for maintaining a healthy lifestyle    You may be retaining fluid if you have a history of heart failure or if you experience any of the following symptoms:  Weight gain of 3 pounds or more overnight or 5 pounds in a week, increased swelling in our hands or feet or shortness of breath while lying flat in bed. Please call your doctor as soon as you notice any of these symptoms; do not wait until your next office visit.

## 2022-05-10 NOTE — PERIOP NOTES
Preoperative flank skin condition: clear, intact   Lead shield: No -    Patient ear protection: Yes   Gel applied to patient's flank and Lithotripsy head: Yes   Starting power level: 7  Shock start time (first  fluoroscopy): 1412  Shock stop time (last lithotripsy shock): 1458  Ending power level: 11  Total shocks: 3000  Total fluoroscopy time: 2:21  Postoperative flank skin condition: redden intact

## 2022-05-10 NOTE — ANESTHESIA POSTPROCEDURE EVALUATION
Procedure(s):  LITHOTRIPSY EXTRACORPOREAL SHOCKWAVE ESWL/ LEFT.     general    Anesthesia Post Evaluation      Multimodal analgesia: multimodal analgesia used between 6 hours prior to anesthesia start to PACU discharge  Patient location during evaluation: PACU  Patient participation: complete - patient participated  Level of consciousness: awake  Pain management: adequate  Airway patency: patent  Anesthetic complications: no  Cardiovascular status: acceptable  Respiratory status: acceptable  Hydration status: acceptable  Post anesthesia nausea and vomiting:  none      INITIAL Post-op Vital signs:   Vitals Value Taken Time   /64 05/10/22 1544   Temp 36.7 °C (98 °F) 05/10/22 1544   Pulse 46 05/10/22 1544   Resp 15 05/10/22 1544   SpO2 99 % 05/10/22 1544

## 2022-05-10 NOTE — OP NOTES
[facility]  OPERATIVE REPORT    Name:  Kiran Lizarraga  MR#:  [id number]  :  1990  ACCOUNT #:  [de-identified]  DATE OF SERVICE:  05/10/2022    PREOPERATIVE DIAGNOSIS:  Left ureteral calculus. POSTOPERATIVE DIAGNOSIS:  Left ureteral calculus. PROCEDURE PERFORMED:  Left extracorporeal shock wave lithotripsy. SURGEON:  Lucía Barbosa MD    ASSISTANT:  None. ANESTHESIA:  General.    COMPLICATIONS:  None. SPECIMENS REMOVED:  None. IMPLANTS:  None. ESTIMATED BLOOD LOSS:  None. FINDINGS:  A 6 mm stone in the left ureteropelvic junction. PROCEDURE:  The patient was given general anesthetic, placed in supine position on the lithotripsy treatment table. C-arm fluoroscopy was used to identify the stone. This is a 6-mm stone at the area of the left UPJ. Treatment was begun at low treatment setting, gradually increased up to a power setting of 11  kV. A total of 3000 shocks were given. The stone showed excellent pulverization. There were no complications. PLAN:  She will be discharged home. Return to the office in 2-3 weeks for KUB.         MD BETH Concepcion/[email or web address]  D:  05/10/2022 15:11  T:  05/10/2022 19:32  JOB #:  [id number]

## 2022-05-10 NOTE — ANESTHESIA PREPROCEDURE EVALUATION
Relevant Problems   ENDOCRINE   (+) Insulin controlled gestational diabetes mellitus (GDM) in third trimester   (+) Type 2 diabetes mellitus (HCC)       Anesthetic History   No history of anesthetic complications            Review of Systems / Medical History  Patient summary reviewed and pertinent labs reviewed    Pulmonary  Within defined limits                 Neuro/Psych   Within defined limits           Cardiovascular                  Exercise tolerance: >4 METS     GI/Hepatic/Renal                Endo/Other    Diabetes: type 2         Other Findings              Physical Exam    Airway  Mallampati: I  TM Distance: < 4 cm  Neck ROM: short neck   Mouth opening: Normal     Cardiovascular    Rhythm: regular  Rate: normal         Dental  No notable dental hx       Pulmonary  Breath sounds clear to auscultation               Abdominal         Other Findings            Anesthetic Plan    ASA: 2  Anesthesia type: general          Induction: Intravenous  Anesthetic plan and risks discussed with: Patient and Spouse

## 2022-05-11 LAB — HCG UR QL: NEGATIVE

## 2022-05-12 ENCOUNTER — HOSPITAL ENCOUNTER (OUTPATIENT)
Dept: NUTRITION | Age: 32
Discharge: HOME | End: 2022-05-12
Payer: SELF-PAY

## 2022-05-12 PROCEDURE — 97802 MEDICAL NUTRITION INDIV IN: CPT

## 2022-05-12 NOTE — PROGRESS NOTES
Nitin Victor, MS, RD, LD  Outpatient Registered Dietitian  [address] [facility]  Phone: [phone]  Fax: [phone]    ASSESSMENT  Pt is a 28 y.o. female referred for dietary counseling and education with the following diagnosis (es):  Other specified diabetes mellitus with other specified complication [Y24.74]   Newly dx by her OB/GYN, suboptimal BG: A1C= 8.5, PCOS,  gestational diabetes with her 2nd child. Strong family hx of DM. Participated in GDM education during her pregnancy. Patient Stated Nutrition Goal: to improve diet and blood sugars to conceive and have a healthy pregnancy. Diet Recall (verbal):  states she is a \"picky eater\". Has reduced SSB consumption since diagnosis. Fasting BG range: high 140-150's: Breakfast apple jacks with almond milk, lunch: left over noodles with chicken, dinner: steak, non starchy vegetables, 2 hr PPBG= 110mg/dL, woke up with BG of 140mg/dL. Drinks water all day. She drinks a sprite when she \"feels her BG is low\". Factors identified today that impact current eating habits include   1. Food preferences: carbohydrate foods- pasta, potatoes, bread, sugar sweetened beverages, fruit juice. Patient Active Problem List   Diagnosis Code    Obesity (BMI 30-39. 9) E66.9    Insulin controlled gestational diabetes mellitus (GDM) in third trimester O24.414    Type 2 diabetes mellitus (HCC) E11.9    PCOS (polycystic ovarian syndrome) E28.2    Irregular menstrual bleeding N92.6    Infertility associated with anovulation N97.0     Lab Results   Component Value Date/Time    Hemoglobin A1c 8.5 (H) 04/13/2022 10:35 AM    Glucose 153 (H) 05/03/2022 05:58 AM    Glucose (POC) 96 05/10/2022 01:46 PM    Creatinine 0.93 05/03/2022 05:58 AM      Meds: metformin. Barriers to change: nutrition knowledge deficit and established food preferences/eating behaviors    Social/ Support: , has 2 children ages 11 and 15.  Cooks dinner though spouse has been cooking this past week since she had a kidney stone. Physical Activity: none sedentary, eats and sits on the sofa and watches TV. Sleep Habits: unknown    Behaviors indicate current stage of change is: Preparation. Anthropometrics:   Estimated body mass index is 34.41 kg/m² as calculated from the following:    Height as of 5/10/22: 5' 6\" (1.676 m). Weight as of 5/10/22: 96.7 kg (213 lb 3.2 oz). Lost 10 pounds since 4/13/2022. Estimated Nutrition Needs:  MSJ x 1.2= 2100 kcal/day- 500 for weight reduction= 1600  Protein: 80g/day (20%)   Carbohydrate: 180g/day (45%)        DIAGNOSIS:  Unbalanced intake of nutrient related to nutrition knowledge and food preferences as evidence by diet recall. Goal: Increase nutrition density/quality of eating pattern, Weight loss of 1-2#/wk  Action Steps:           1. Increase fiber in diet from baseline               -2 servings of fruits and 4 cups of non starchy vegetables          2. Decrease portion size of refine/processed carbohydrates   3. Continue to drink >64 ounces of water, start with 16 ounces in the morning before meals. 4. Consume 4 structured meals daily- use my plate guide to compose meals/snacks  5. Check and record blood sugars with meals as follows  Fasting, and 2 hrs PPBG. ADA Goals provided, or follow MD recommendations. MONITORING & EVALUATION:  Monitor food/nutrient intake  Follow Up: PRN. Follow meal guide provide.

## 2022-05-25 ENCOUNTER — OFFICE VISIT (OUTPATIENT)
Dept: INTERNAL MEDICINE CLINIC | Facility: CLINIC | Age: 32
End: 2022-05-25
Payer: COMMERCIAL

## 2022-05-25 VITALS
BODY MASS INDEX: 34.39 KG/M2 | HEIGHT: 66 IN | RESPIRATION RATE: 12 BRPM | OXYGEN SATURATION: 98 % | WEIGHT: 214 LBS | DIASTOLIC BLOOD PRESSURE: 74 MMHG | SYSTOLIC BLOOD PRESSURE: 118 MMHG | HEART RATE: 64 BPM

## 2022-05-25 DIAGNOSIS — E11.9 TYPE 2 DIABETES MELLITUS WITHOUT COMPLICATION, WITHOUT LONG-TERM CURRENT USE OF INSULIN (HCC): Primary | ICD-10-CM

## 2022-05-25 PROCEDURE — 99214 OFFICE O/P EST MOD 30 MIN: CPT | Performed by: INTERNAL MEDICINE

## 2022-05-25 PROCEDURE — 3052F HG A1C>EQUAL 8.0%<EQUAL 9.0%: CPT | Performed by: INTERNAL MEDICINE

## 2022-05-25 RX ORDER — METFORMIN HYDROCHLORIDE 500 MG/1
2000 TABLET, EXTENDED RELEASE ORAL
Qty: 360 TABLET | Refills: 0 | Status: SHIPPED | OUTPATIENT
Start: 2022-05-25 | End: 2022-06-23

## 2022-05-25 ASSESSMENT — ENCOUNTER SYMPTOMS
SHORTNESS OF BREATH: 0
WHEEZING: 0
CHEST TIGHTNESS: 0
NAUSEA: 1
ABDOMINAL DISTENTION: 0

## 2022-05-25 ASSESSMENT — PATIENT HEALTH QUESTIONNAIRE - PHQ9
SUM OF ALL RESPONSES TO PHQ QUESTIONS 1-9: 0
SUM OF ALL RESPONSES TO PHQ QUESTIONS 1-9: 0
1. LITTLE INTEREST OR PLEASURE IN DOING THINGS: 0
SUM OF ALL RESPONSES TO PHQ QUESTIONS 1-9: 0
SUM OF ALL RESPONSES TO PHQ QUESTIONS 1-9: 0
2. FEELING DOWN, DEPRESSED OR HOPELESS: 0
SUM OF ALL RESPONSES TO PHQ9 QUESTIONS 1 & 2: 0

## 2022-05-25 ASSESSMENT — ANXIETY QUESTIONNAIRES
IF YOU CHECKED OFF ANY PROBLEMS ON THIS QUESTIONNAIRE, HOW DIFFICULT HAVE THESE PROBLEMS MADE IT FOR YOU TO DO YOUR WORK, TAKE CARE OF THINGS AT HOME, OR GET ALONG WITH OTHER PEOPLE: NOT DIFFICULT AT ALL
5. BEING SO RESTLESS THAT IT IS HARD TO SIT STILL: 0
GAD7 TOTAL SCORE: 0
3. WORRYING TOO MUCH ABOUT DIFFERENT THINGS: 0
6. BECOMING EASILY ANNOYED OR IRRITABLE: 0
2. NOT BEING ABLE TO STOP OR CONTROL WORRYING: 0
1. FEELING NERVOUS, ANXIOUS, OR ON EDGE: 0
7. FEELING AFRAID AS IF SOMETHING AWFUL MIGHT HAPPEN: 0
4. TROUBLE RELAXING: 0

## 2022-05-25 NOTE — PROGRESS NOTES
Chief Complaint   Patient presents with    Follow-up     Diabetes. Latoya Higgins is a 28 y.o. female who presents today for Follow-up (Diabetes.)  Since last visit she has been seen by urology due to kidney stones,   LITHOTRIPSY EXTRACORPOREAL SHOCKWAVE ESWL  done 5/10/22  Has been following with GYN, and recently her metformin was increased to 1000 twice a day. She has been experiencing nausea and vomiting, is not sure if this is associated with her kidney stones, or side effects of the medication. Has been checking her blood sugars 4 times a day, fasting, and 2 hours postprandial, postprandial readings has been within normal limits, in rare occasions above 180. But her fasting blood sugars continue to be in the 150s. She has been making changes in her diet, before the kidney stone she was exercising, and now is ready to restart her exercise routine. She has a follow-up coming up with urology, and high risk maternal-fetal provider. And she is also in the process of having a hysteroscopy. Wt Readings from Last 3 Encounters:   05/25/22 214 lb (97.1 kg)   05/19/22 213 lb (96.6 kg)           Assessment and plan:  1. Type 2 diabetes mellitus without complication, without long-term current use of insulin ([facility])  Assessment & Plan:   She will continue with nutritional changes,  Her fasting blood sugars continue to be in suboptimal control,  Improving postprandial sugars  Agree with increasing metformin to 1000 twice a day, but will change to extended release prescription to decrease side effects. We will follow-up again in 4 weeks, if goal is not achieved of fasting blood sugars below 130 we will consider low-dose of basal insulin. In other circumstances we may try other medications, but she is currently in the process of getting pregnant  Orders:  -     metFORMIN (GLUCOPHAGE-XR) 500 mg extended release tablet;  Take 4 tablets by mouth daily (with breakfast), Disp-360 tablet, R-0Normal  -     Lipid Panel; Future  -     CBC with Auto Differential; Future  -     Comprehensive Metabolic Panel; Future  -     Microalbumin / Creatinine Urine Ratio; Future  -     Hemoglobin A1C; Future      Return in about 4 weeks (around 6/22/2022) for diabetes. Review of system:    Review of Systems   Constitutional: Negative for activity change, fatigue and unexpected weight change. Respiratory: Negative for chest tightness, shortness of breath and wheezing. Cardiovascular: Negative for chest pain, palpitations and leg swelling. Gastrointestinal: Positive for nausea. Negative for abdominal distention. Genitourinary: Negative for difficulty urinating, dyspareunia, flank pain, frequency, hematuria and urgency. Musculoskeletal: Negative for myalgias. Neurological: Negative for dizziness and headaches.          Immunization history:    Immunization History   Administered Date(s) Administered    Influenza Trivalent 11/29/2016       Current medications:      Current Outpatient Medications:     metFORMIN (GLUCOPHAGE-XR) 500 mg extended release tablet, Take 4 tablets by mouth daily (with breakfast), Disp: 360 tablet, Rfl: 0    Lancets MISC, Check blood sugar fasting before breakfast then 1 hour after the first bite of each meal (breakfast,lunch,dinner), Disp: , Rfl:       Family history:    Family History   Problem Relation Age of Onset    Breast Cancer Paternal Grandmother     Diabetes Paternal Grandfather     Colon Cancer Neg Hx     Cancer Paternal Grandmother     Diabetes Father     Ovarian Cancer Neg Hx         Past medical history:    Past Medical History:   Diagnosis Date    Anemia 2009    in pregnancy    History of gestational diabetes 2016    Infertility associated with anovulation     Irregular menstrual bleeding     PCOS (polycystic ovarian syndrome)     Type 2 diabetes mellitus ([facility] [address]     on oral agent, avg fbs 145, A1C 8.5 4/2022          Physical exam:    /74 (Site: Left Upper Arm, Position: Sitting)   Pulse 64   Resp 12   Ht 5' 6\" (1.676 m)   Wt 214 lb (97.1 kg)   LMP 04/22/2022 (Exact Date)   SpO2 98%   Breastfeeding No   BMI 34.54 kg/m²     Physical Exam  Vitals reviewed. Constitutional:       Appearance: Normal appearance. HENT:      Head: Normocephalic and atraumatic. Cardiovascular:      Rate and Rhythm: Normal rate and regular rhythm. Pulmonary:      Effort: Pulmonary effort is normal.      Breath sounds: Normal breath sounds. Abdominal:      Palpations: Abdomen is soft. Neurological:      General: No focal deficit present. Mental Status: She is alert and oriented to person, place, and time. Psychiatric:         Mood and Affect: Mood normal.         Behavior: Behavior normal.          Recent labs:    Lab Results   Component Value Date     05/03/2022    K 3.7 05/03/2022     05/03/2022    CO2 23 05/03/2022    BUN 19 05/03/2022    GFRAA >60 05/03/2022    GLOB 4.0 05/03/2022    ALT 32 05/03/2022    AST 14 05/03/2022      Lab Results   Component Value Date    WBC 10.8 05/03/2022    HGB 14.0 05/03/2022    HCT 41.5 05/03/2022     05/03/2022    MCV 85.2 05/03/2022     Lab Results   Component Value Date    LABA1C 8.5 (H) 04/13/2022     Lab Results   Component Value Date     04/13/2022         This document was generated with the aid of voice recognition software. . Please be aware that there may be inadvertent transcription errors not identified and corrected by the [employer]

## 2022-05-25 NOTE — ASSESSMENT & PLAN NOTE
She will continue with nutritional changes,  Her fasting blood sugars continue to be in suboptimal control,  Improving postprandial sugars  Agree with increasing metformin to 1000 twice a day, but will change to extended release prescription to decrease side effects. We will follow-up again in 4 weeks, if goal is not achieved of fasting blood sugars below 130 we will consider low-dose of basal insulin.   In other circumstances we may try other medications, but she is currently in the process of getting pregnant

## 2022-05-25 NOTE — PATIENT INSTRUCTIONS
Change metformin for Extended release to decrease side effects  2 tablets twice a day  Patient Education        Resistance Training With Free Weights: Exercises  Introduction  Here are some examples of exercises for resistance training. Start eachexercise slowly. Ease off the exercise if you start to have pain. Your doctor or physical therapist will tell you when you can start theseexercises and which ones will work best for you. How to do the exercises  Chest fly    1. Lie on a bench or exercise ball, and hold the weights straight up over your chest. Do not lock your elbows. You can keep them slightly bent if that is comfortable for you. 2. Slowly lower your arms, keeping them extended, until the weights are level with your chest, or slightly lower. 3. Slowly raise your arms until you are in the starting position. 4. Repeat 8 to 12 times. 5. Rest for a minute, and repeat the exercise. Lateral raise for the outer part of the shoulder (deltoid)    1. Stand with your feet shoulder-width apart and your knees slightly bent. 2. Bend your arms 90 degrees with your elbows at hip level. With your palms facing in, hold the weights straight in front of you. 3. Slowly lift the weights and your elbows out to the sides to shoulder level, keeping your elbows bent. Keep your shoulders down and relaxed as you lift. If you find you are shrugging your shoulders up toward your ears, your weights may be too heavy. 4. Slowly lower the weights back to your sides. 5. Repeat 8 to 12 times. 6. Rest for a minute, and repeat the exercise. Biceps curls    1. Sit leaning forward with your legs slightly spread and your left hand on your left thigh. 2. Hold the weight in your right hand, and place your right elbow on your right thigh. 3. Slowly curl the weight up and toward your chest.  4. Slowly lower the weight to the original position. 5. Repeat 8 to 12 times. 6. Rest for a minute, and repeat the exercise.   7. Do the same exercise with your other arm. Follow-up care is a key part of your treatment and safety. Be sure to make and go to all appointments, and call your doctor if you are having problems. It's also a good idea to know your test results and keep alist of the medicines you take. Where can you learn more? Go to [email or web address] org and sign in to your [employer] account. Enter Z245 in the Piiku box to learn more about \"Resistance Training With Free Weights: Exercises. \"     If you do not have an account, please click on the \"Sign Up Now\" link. Current as of: May 12, 2021               Content Version: 13.2  © 2006-2022 Healthwise, Incorporated. Care instructions adapted under license by [facility] ([facility]). If you have questions about a medical condition or this instruction, always ask your healthcare professional. [address] any warranty or liability for your use of this information.

## 2022-06-01 ENCOUNTER — OFFICE VISIT (OUTPATIENT)
Dept: UROLOGY | Age: 32
End: 2022-06-01

## 2022-06-01 DIAGNOSIS — N20.0 KIDNEY STONE: Primary | ICD-10-CM

## 2022-06-01 PROCEDURE — 99024 POSTOP FOLLOW-UP VISIT: CPT | Performed by: NURSE PRACTITIONER

## 2022-06-01 RX ORDER — SILODOSIN 8 MG/1
8 CAPSULE ORAL DAILY
Qty: 30 CAPSULE | Refills: 0 | Status: SHIPPED | OUTPATIENT
Start: 2022-06-01 | End: 2022-06-09 | Stop reason: ALTCHOICE

## 2022-06-01 ASSESSMENT — ENCOUNTER SYMPTOMS
ABDOMINAL PAIN: 0
BACK PAIN: 0

## 2022-06-01 NOTE — PROGRESS NOTES
[facility]  [address] [facility]  Colby Favre [address], [address] [facility]  [phone]          Jailyn Odom  : 1990    Chief Complaint   Patient presents with    Follow-up     [facility] 5/10/22    Nephrolithiasis          HPI     Jailyn Odom is a 28 y.o. female referred for ER follow up 22. Reports L flank pain and fever 3/22. Seen at urgent care. She reports xray was done and she was told she had no stones. Dx w L pyelonephritis and treated w antibiotics. Sx improved. I do not have these records.     She reports abrupt onset of L flank pain 5/3/22 (woke her from her sleep). She presented to [facility]. No prior h/o renal stones. Both mom and dad have long h/o renal stones. CT urogram showed L UPJ stone w mild obstruction. Add B renal stones noted. Images reviewed personally. sCr 0.93. Urine micro w hematuria but no bacteria.     KUB 22 showed L UPJ stone. Due to worsening pain, she opted to undergo L ESWL. Here for follow up. No further pain. No fever/chills, gross hematuria, or LUTS. No passed stones. Past Medical History:   Diagnosis Date    Anemia     in pregnancy    History of gestational diabetes 2016    Infertility associated with anovulation     Irregular menstrual bleeding     PCOS (polycystic ovarian syndrome)     Type 2 diabetes mellitus ([facility] [address]     on oral agent, avg fbs 145, A1C 8.5 2022     Past Surgical History:   Procedure Laterality Date     SECTION      LITHOTRIPSY  05/10/2022    ORTHOPEDIC SURGERY Left     Knee     Current Outpatient Medications   Medication Sig Dispense Refill    metFORMIN (GLUCOPHAGE-XR) 500 mg extended release tablet Take 4 tablets by mouth daily (with breakfast) 360 tablet 0    Lancets MISC Check blood sugar fasting before breakfast then 1 hour after the first bite of each meal (breakfast,lunch,dinner)       No current facility-administered medications for this visit.      Allergies   Allergen Reactions    Sulfa Antibiotics Rash     Social History     Socioeconomic History    Marital status: Single     Spouse name: Not on file    Number of children: Not on file    Years of education: Not on file    Highest education level: Not on file   Occupational History    Not on file   Tobacco Use    Smoking status: Never Smoker    Smokeless tobacco: Never Used   Substance and Sexual Activity    Alcohol use: No    Drug use: No    Sexual activity: Not on file   Other Topics Concern    Not on file   Social History Narrative    Not on file     Social Determinants of Health     Financial Resource Strain:     Difficulty of Paying Living Expenses: Not on file   Food Insecurity:     Worried About Running Out of Food in the Last Year: Not on file    [place] of Food in the Last Year: Not on file   Transportation Needs:     Lack of Transportation (Medical): Not on file    Lack of Transportation (Non-Medical):  Not on file   Physical Activity:     Days of Exercise per Week: Not on file    Minutes of Exercise per Session: Not on file   Stress:     Feeling of Stress : Not on file   Social Connections:     Frequency of Communication with Friends and Family: Not on file    Frequency of Social Gatherings with Friends and Family: Not on file    Attends [employer] Services: Not on file    Active Member of [address] [place] [address] [email or web address] or Organizations: Not on file    Attends Club or Organization Meetings: Not on file    Marital Status: Not on file   Intimate Partner Violence:     Fear of Current or Ex-Partner: Not on file    Emotionally Abused: Not on file    Physically Abused: Not on file    Sexually Abused: Not on file   Housing Stability:     Unable to Pay for Housing in the Last Year: Not on file    Number of [place] in the Last Year: Not on file    Unstable Housing in the Last Year: Not on file     Family History   Problem Relation Age of Onset    Breast Cancer Paternal Grandmother     Diabetes Paternal Jewel Harrier Colon

## 2022-06-02 ENCOUNTER — TELEPHONE (OUTPATIENT)
Dept: UROLOGY | Age: 32
End: 2022-06-02

## 2022-06-02 DIAGNOSIS — N20.0 KIDNEY STONE: Primary | ICD-10-CM

## 2022-06-02 RX ORDER — TAMSULOSIN HYDROCHLORIDE 0.4 MG/1
0.4 CAPSULE ORAL DAILY
Qty: 30 CAPSULE | Refills: 0 | Status: SHIPPED | OUTPATIENT
Start: 2022-06-02 | End: 2022-07-18

## 2022-06-02 NOTE — TELEPHONE ENCOUNTER
Louise Huffman with [facility] pharmacy called stating patient's insurance does not cover Rapaflo Brand or Generic (Patient has GEOFFREY) and needs a change.

## 2022-06-02 NOTE — TELEPHONE ENCOUNTER
Patient has sulfa allergy. Will you see if she is comfortable taking Flomax? There is a possible chance of reaction d/t allergy to sulfa.

## 2022-06-02 NOTE — TELEPHONE ENCOUNTER
Called and spk with patient. According to Op note patient has taken Flomax just recently. Sulfa causes rashes and she was nauseous while on Flomax but ended up having Lithotripsy so not sure if it was stone sx or rxn. Patient states she is ok trying Flomax. Will call us if she has rxn.

## 2022-06-09 ENCOUNTER — OFFICE VISIT (OUTPATIENT)
Dept: OBGYN CLINIC | Age: 32
End: 2022-06-09
Payer: COMMERCIAL

## 2022-06-09 VITALS
BODY MASS INDEX: 34.55 KG/M2 | SYSTOLIC BLOOD PRESSURE: 118 MMHG | DIASTOLIC BLOOD PRESSURE: 78 MMHG | WEIGHT: 215 LBS | HEIGHT: 66 IN

## 2022-06-09 DIAGNOSIS — Z87.442 HISTORY OF KIDNEY STONES: ICD-10-CM

## 2022-06-09 DIAGNOSIS — N97.0 INFERTILITY ASSOCIATED WITH ANOVULATION: ICD-10-CM

## 2022-06-09 DIAGNOSIS — E28.2 PCOS (POLYCYSTIC OVARIAN SYNDROME): ICD-10-CM

## 2022-06-09 DIAGNOSIS — E11.65 TYPE 2 DIABETES MELLITUS WITH HYPERGLYCEMIA, WITHOUT LONG-TERM CURRENT USE OF INSULIN (HCC): Primary | ICD-10-CM

## 2022-06-09 DIAGNOSIS — E66.9 OBESITY (BMI 30-39.9): ICD-10-CM

## 2022-06-09 DIAGNOSIS — N92.6 IRREGULAR MENSTRUAL BLEEDING: ICD-10-CM

## 2022-06-09 DIAGNOSIS — Z86.32 HISTORY OF GESTATIONAL DIABETES: ICD-10-CM

## 2022-06-09 PROCEDURE — 3052F HG A1C>EQUAL 8.0%<EQUAL 9.0%: CPT | Performed by: OBSTETRICS & GYNECOLOGY

## 2022-06-09 PROCEDURE — 99204 OFFICE O/P NEW MOD 45 MIN: CPT | Performed by: OBSTETRICS & GYNECOLOGY

## 2022-06-09 ASSESSMENT — PATIENT HEALTH QUESTIONNAIRE - PHQ9
SUM OF ALL RESPONSES TO PHQ QUESTIONS 1-9: 0
2. FEELING DOWN, DEPRESSED OR HOPELESS: 0
1. LITTLE INTEREST OR PLEASURE IN DOING THINGS: 0
SUM OF ALL RESPONSES TO PHQ QUESTIONS 1-9: 0
SUM OF ALL RESPONSES TO PHQ9 QUESTIONS 1 & 2: 0

## 2022-06-09 NOTE — PROGRESS NOTES
Assessment/Plan  28 y.o. [id number] with   Patient Active Problem List    Diagnosis Date Noted    Type 2 diabetes mellitus ([facility] [address]      Priority: High     2022 at [facility]: Currently on Metformin ER 1000 mg BID, not having too many side effects. Stress activity after meals. Explained the complications of  pulmonary immaturity, stillbirth, shoulder dystocia and fetal hypoglycemia associated with lack of BG compliance in pregnancy and the importance of pre-pregnancy A1c of 6.5% or lower to decrease risk of heart defects, malformations and risk of miscarriage and maintaining A1c of 6% during pregnancy. Diet discussed in length and all questions answered. Spoke in length with patient regarding low-carb diet, and meal options. Reviewed carb-counting, regular meal times and snacks. Reinforced recommended carb load ranges per meal; breakfast 15-30 grams and lunch and dinner 30-45 grams. Discussed further weight loss, dietary changes, increased activity, and continuing Metformin at this time for management. Would not recommend starting insulin at this time since it may hinder weight loss. Encouraged use of behavioral/emotional modification to aid in optimization of weight and glycemic control. Pt requests endo referral.             History of kidney stones 2022     Priority: Medium     2022 [facility]: flomax tolerated; s/p lithotripsy, still with residual pieces and small stones.  PCOS (polycystic ovarian syndrome)      Priority: Medium    Infertility associated with anovulation      Priority: Low    History of gestational diabetes 2016     Priority: Low     Pt had in 2017 pregnancy with polyhydramnios, insulin controlled.  Obesity (BMI 30-39.9) 10/10/2016     Priority: Low    Irregular menstrual bleeding      1) Type 2 DM. Patient currently takes oral medication. Poorly controlled diabetes in pregnancy is associated with poor fetal outcomes.      First trimester elevations an individualized meal plan was made. Pt verbalizes a better understanding of how to select food choices for meals. Instructed on hypoglycemia an how to treat lows. Pt is to check FBS & 1 hour after meals. Stressed importance of tight blood sugar control during pregnancy with goal of FBS < 95 and 1hPP < 120. Instructed pt to send in blood sugars weekly, every 2-3 days with elevations. Infants of diabetic mothers with poor control are at risk for  hypoglycemia. With poor control, polyhydramnios, or macrosomia, fetus is at risk for metabolic syndrome later in life. This risk may be decreased by exposure to at least 6-12 months of breastfeeding. Consideration of  milk expression program encouraged. Diet counseling today. Podcasts and resources to optimize behavioral modifications. Encouraged loss of at least 5-10# to improve fertility, optimize maternal health and limit risk for abnormal embryogenesis. 2) Renal Stones  Pt tolerating flomax even with sulfa allergy in the past.   Recommend finish treatment prior to conception    3) PCOS may have an impact on maternal and fetal health. This may be a significant component in current subfertility. Pt to complete anatomic evaluation prior to attempting conception again. Most women with PCOS are also hyperinsulinemic and insulin resistant, independent of obesity, compared with normal women. In addition, the prevalence of the metabolic syndrome in women with PCOS appears to be increased. Overall, 50 to 70 percent of women with PCOS demonstrate clinically measurable insulin resistance in vivo, above and beyond that determined by their body weight. The risk of pregnancy complications is increased in women with PCOS. The spontaneous  rate in women with PCOS is 20 to 40 percent higher than the baseline in the general obstetric population.      Normal pregnancy is characterized by hyperplasia of the insulin-secreting pancreatic beta cells, increased insulin secretion, and an early increase in insulin sensitivity followed by progressive insulin resistance. Insulin and insulin-like growth factors are essential for the regulation of energy metabolism, cell proliferation, tissue development, and differentiation. Maternal insulin resistance is a normal phenomenon that begins in the second trimester and peaks in the third trimester. It results from increased placental secretion of diabetogenic hormones including growth hormone, CRH (which drives the release of ACTH and cortisol), hCS, and progesterone. HCS plays a major role in maternal insulin resistance, peaking at 30 weeks of gestation. In a meta-analysis of 32 studies involving 0 women with PCOS, the odds ratios (ORs) of developing gestational diabetes, pregnancy-induced hypertension, pre-eclampsia, and  birth were 3.4, 3.4, 2.2, and 1.9, respectively, when compared with the general obstetric population. In addition, their babies had a higher risk of admission to the  intensive care unit (NICU) (OR 2.3). Obesity, in the absence of PCOS, is also a risk factor for these complications. Additional plans and concerns as documented in problem list.   All questions answered and concerns discussed. An electronic signature was used to authenticate this note. Christian Mcfarlane MD    I have spent 55 minutes reviewing previous notes, test results and face to face with the patient discussing the diagnosis and importance of compliance with the treatment plan, in addition to ultrasound findings, as well as documenting on the day of the visit (2022)    Return as needed for concerns    Patient Active Problem List   Diagnosis Code    History of gestational diabetes Z86.32    [facility] E11.9    PCOS (polycystic ovarian syndrome) E28.2    Obesity (BMI 30-39. 9) E66.9    Irregular menstrual bleeding N92.6    Infertility associated with anovulation N97.0  History of kidney stones Z87.442

## 2022-06-13 DIAGNOSIS — E13.69 OTHER SPECIFIED DIABETES MELLITUS WITH OTHER SPECIFIED COMPLICATION (HCC): ICD-10-CM

## 2022-06-13 RX ORDER — BLOOD SUGAR DIAGNOSTIC
STRIP MISCELLANEOUS
Qty: 100 STRIP | Refills: 1 | Status: SHIPPED | OUTPATIENT
Start: 2022-06-13 | End: 2022-08-22

## 2022-06-13 RX ORDER — LANCETS
EACH MISCELLANEOUS
Qty: 120 EACH | Refills: 1 | Status: SHIPPED | OUTPATIENT
Start: 2022-06-13 | End: 2022-07-25 | Stop reason: SDUPTHER

## 2022-06-22 ENCOUNTER — OFFICE VISIT (OUTPATIENT)
Dept: OBGYN CLINIC | Age: 32
End: 2022-06-22
Payer: COMMERCIAL

## 2022-06-22 VITALS
DIASTOLIC BLOOD PRESSURE: 74 MMHG | WEIGHT: 210 LBS | HEIGHT: 66 IN | BODY MASS INDEX: 33.75 KG/M2 | SYSTOLIC BLOOD PRESSURE: 118 MMHG

## 2022-06-22 DIAGNOSIS — E11.65 TYPE 2 DIABETES MELLITUS WITH HYPERGLYCEMIA, WITHOUT LONG-TERM CURRENT USE OF INSULIN (HCC): ICD-10-CM

## 2022-06-22 DIAGNOSIS — N92.6 IRREGULAR MENSTRUAL BLEEDING: Primary | ICD-10-CM

## 2022-06-22 DIAGNOSIS — E28.2 PCOS (POLYCYSTIC OVARIAN SYNDROME): ICD-10-CM

## 2022-06-22 DIAGNOSIS — N84.0 ENDOMETRIAL POLYP: ICD-10-CM

## 2022-06-22 DIAGNOSIS — Z71.89 SURGICAL COUNSELING VISIT: ICD-10-CM

## 2022-06-22 DIAGNOSIS — N97.0 INFERTILITY ASSOCIATED WITH ANOVULATION: ICD-10-CM

## 2022-06-22 LAB
EST. AVERAGE GLUCOSE BLD GHB EST-MCNC: 137 MG/DL
HBA1C MFR BLD: 6.4 % (ref 4.2–6.3)

## 2022-06-22 PROCEDURE — 3044F HG A1C LEVEL LT 7.0%: CPT | Performed by: OBSTETRICS & GYNECOLOGY

## 2022-06-22 PROCEDURE — 99213 OFFICE O/P EST LOW 20 MIN: CPT | Performed by: OBSTETRICS & GYNECOLOGY

## 2022-06-22 NOTE — PROGRESS NOTES
CC: PreOp      HPI:    Kamini Latif  is a 28 y.o. ,  who is seen for PreOP visit for planned hysteroscopy D&C for c/o IMB, likely endometrial polyp. Pt was also recently diagnosed with uncontrolled T2DM. Patient's last menstrual period was 2022 (exact date). PCP: Dr Griffin Araujo ([facility])    Contraception:  Condoms now after counseling w/ regards to recommendation on improving hgb A1C first  Has been trying to conceive for 2yrs         Hx irregular periods--occasionally skips a month  Will occur mostly monthly sporadically around 30 days but only having very light spotting x 3-5 days , no pain   No pain or vb w/ sex    Reports having 2 periods last month -- becoming more irregular now. Imaging:  TVUS 22:  Uterus 118mL  EMS 15mm   MILDLY ENLARGED ANTEVERTED UTERUS  ENDOMETRIUM - 15. 06MM,ECHOGENIC AREA SEEN WITHIN  MEASURING 1.0 X 0.8 X 0.7CM POSSIBLE POLYP? RT OV- SEEN TA, APPEARS POLYCYSTIC, DIFFICULT TO EVALUATE  LT OV- SEEN TA, APPEARS WNL, DIFFICULT TO EVALUATE  NO FREE FLUID          New T2DM, BMI 35, PCOS:  Hemoglobin A1c significantly elevated at 8.5 (22)   S/p MFM preconception counseling  S/p referral to a diabetic MD     Going to see PCP tomorrow and will discuss insulin   Pt doesn't like Metformin --- feels bad on this      Fasting :  130's-150s   1hr PP: most all elevated; 202 max      Repeat hgb A1C today ___            GYN HISTORY:  As per HPI    Hx STDs:  Trich     Last Pap:  2016-- neg cytology, +Trich   Hx Abnl Paps: Only as above       Current Outpatient Medications on File Prior to Visit   Medication Sig Dispense Refill    promethazine (PHENERGAN) 12.5 mg tablet Take 1 Tablet by mouth every six (6) hours as needed for Nausea. 12 Tablet 0    ondansetron hcl (Zofran) 8 mg tablet Take 1 Tablet by mouth every eight (8) hours as needed for Nausea, Vomiting or Nausea or Vomiting.  20 Tablet 0    Blood-Glucose Meter monitoring kit Check blood sugar fasting before breakfast then 1 hour after the first bite of each meal (breakfast,lunch,dinner) 1 Kit 0    glucose blood VI test strips (blood glucose test) strip Check blood sugar fasting before breakfast then 1 hour after the first bite of each meal (breakfast,lunch,dinner) 120 Strip 1    lancets misc Check blood sugar fasting before breakfast then 1 hour after the first bite of each meal (breakfast,lunch,dinner) 120 Each 1     No current facility-administered medications on file prior to visit. Past Medical History:   Diagnosis Date    Anemia 2009    in pregnancy    History of gestational diabetes 2016    Infertility associated with anovulation     Irregular menstrual bleeding     Nephrolithiasis     PCOS (polycystic ovarian syndrome)     Type 2 diabetes mellitus (HCC)     on oral agent, avg fbs 145, A1C 8.5 2022         Past Surgical History:   Procedure Laterality Date    HX  SECTION      HX LITHOTRIPSY  05/10/2022    HX ORTHOPAEDIC Left     Knee         Outpatient Encounter Medications as of 2022   Medication Sig Dispense Refill    progesterone (PROMETRIUM) 200 mg capsule Take 1 Capsule by mouth nightly. For 10 days each month of no period on your own. 10 Capsule 12    metFORMIN (GLUCOPHAGE) 1,000 mg tablet Take 1 Tablet by mouth daily (after dinner). 90 Tablet 3    promethazine (PHENERGAN) 12.5 mg tablet Take 1 Tablet by mouth every six (6) hours as needed for Nausea. 12 Tablet 0    ondansetron hcl (Zofran) 8 mg tablet Take 1 Tablet by mouth every eight (8) hours as needed for Nausea, Vomiting or Nausea or Vomiting.  20 Tablet 0    Blood-Glucose Meter monitoring kit Check blood sugar fasting before breakfast then 1 hour after the first bite of each meal (breakfast,lunch,dinner) 1 Kit 0    glucose blood VI test strips (blood glucose test) strip Check blood sugar fasting before breakfast then 1 hour after the first bite of each meal (breakfast,lunch,dinner) 120 Strip 1    lancets misc Check blood sugar fasting before breakfast then 1 hour after the first bite of each meal (breakfast,lunch,dinner) 120 Each 1    [DISCONTINUED] ketorolac (TORADOL) 10 mg tablet Take 1 Tablet by mouth every six (6) hours as needed for Pain. (Patient not taking: Reported on 5/19/2022) 30 Tablet 0    [DISCONTINUED] tamsulosin (Flomax) 0.4 mg capsule Take 1 Capsule by mouth daily for 15 days. (Patient taking differently: Take 0.4 mg by mouth daily (after lunch). ) 15 Capsule 0    [DISCONTINUED] metFORMIN ER (GLUCOPHAGE XR) 500 mg tablet Take 1 Tablet by mouth daily (with dinner). 30 Tablet 1     No facility-administered encounter medications on file as of 5/19/2022. Allergies   Allergen Reactions    Sulfa (Sulfonamide Antibiotics) Rash         Family History   Problem Relation Age of Onset    Diabetes Father     Cancer Paternal Grandmother     Breast Cancer Paternal Grandmother     Diabetes Paternal Grandfather     Colon Cancer Neg Hx     Ovarian Cancer Neg Hx          Social History     Socioeconomic History    Marital status: SINGLE   Tobacco Use    Smoking status: Never Smoker    Smokeless tobacco: Never Used   Vaping Use    Vaping Use: Never used   Substance and Sexual Activity    Alcohol use: No    Drug use: No    Sexual activity: Yes     Partners: Male     Birth control/protection: None           ROS:  Review of Systems   Constitutional: Negative for chills, fever and weight loss. HENT: Negative for hearing loss. Eyes: Negative for blurred vision and double vision. Respiratory: Negative for cough, hemoptysis and shortness of breath. Cardiovascular: Negative for chest pain, palpitations and orthopnea. Gastrointestinal: Negative for abdominal pain, blood in stool, constipation, diarrhea, nausea and vomiting. Genitourinary: Negative for dysuria, frequency, hematuria and urgency. Musculoskeletal: Negative for falls, joint pain and myalgias. Skin: Negative for itching and rash. Neurological: Negative for headaches. Endo/Heme/Allergies: Does not bruise/bleed easily. Psychiatric/Behavioral: Negative for depression and suicidal ideas. The patient is not nervous/anxious. All other systems reviewed and are negative. PHYSICAL EXAM:  Visit Vitals  BP (!) 148/94   Ht 5' 6\" (1.676 m)   Wt 213 lb (96.6 kg)   LMP 2022   BMI 34.38 kg/m²        Constitutional: She appears well-developed and well-nourished. No distress. HENT:    Head: Normocephalic and atraumatic. Cardiovascular: Regular pulse   Pulmonary/Chest: Effort normal  Skin: She is not diaphoretic. Psychiatric: She has a normal mood and affect. Her behavior is normal. Thought content normal.            Counseling    Discussed how the actual planned surgery would be performed as well as the potential  risks of surgery including bleeding, infection, DVT, risks of surgical injuries to internal organs including but not limited to the bowels, bladder, rectum, and female reproductive organs. The patient understands the risks, any and all questions were answered to the patient's satisfaction. ASSESSMENT/PLAN:   28 y.o.,  new pt with IMB, likely endometrial polyp:     -Posted for hysteroscopy D&C for likely polyp, IMB   -continue Prometerium 200mg QHS x 10days every month if no period on her own  -hgb A1C        Orders Placed This Encounter    AMB POC US, TRANSVAGINAL     Order Specific Question:   Reason for Exam     Answer:   infertility     Order Specific Question:   Is Patient Pregnant? Answer:   Unknown    progesterone (PROMETRIUM) 200 mg capsule     Sig: Take 1 Capsule by mouth nightly. For 10 days each month of no period on your own. Dispense:  10 Capsule     Refill:  12    metFORMIN (GLUCOPHAGE) 1,000 mg tablet     Sig: Take 1 Tablet by mouth daily (after dinner).      Dispense:  90 Tablet     Refill:  3         Aruna Espinosa MD

## 2022-06-23 ENCOUNTER — OFFICE VISIT (OUTPATIENT)
Dept: INTERNAL MEDICINE CLINIC | Facility: CLINIC | Age: 32
End: 2022-06-23
Payer: COMMERCIAL

## 2022-06-23 ENCOUNTER — TELEPHONE (OUTPATIENT)
Dept: INTERNAL MEDICINE CLINIC | Facility: CLINIC | Age: 32
End: 2022-06-23

## 2022-06-23 VITALS
RESPIRATION RATE: 12 BRPM | BODY MASS INDEX: 33.94 KG/M2 | SYSTOLIC BLOOD PRESSURE: 118 MMHG | DIASTOLIC BLOOD PRESSURE: 64 MMHG | HEIGHT: 66 IN | OXYGEN SATURATION: 98 % | WEIGHT: 211.2 LBS | HEART RATE: 78 BPM

## 2022-06-23 DIAGNOSIS — Z79.4 TYPE 2 DIABETES MELLITUS WITHOUT COMPLICATION, WITH LONG-TERM CURRENT USE OF INSULIN (HCC): Primary | ICD-10-CM

## 2022-06-23 DIAGNOSIS — E11.9 TYPE 2 DIABETES MELLITUS WITHOUT COMPLICATION, WITH LONG-TERM CURRENT USE OF INSULIN (HCC): Primary | ICD-10-CM

## 2022-06-23 PROCEDURE — 99214 OFFICE O/P EST MOD 30 MIN: CPT | Performed by: INTERNAL MEDICINE

## 2022-06-23 PROCEDURE — 3044F HG A1C LEVEL LT 7.0%: CPT | Performed by: INTERNAL MEDICINE

## 2022-06-23 RX ORDER — INSULIN GLARGINE 100 [IU]/ML
10 INJECTION, SOLUTION SUBCUTANEOUS NIGHTLY
Qty: 5 PEN | Refills: 0 | Status: SHIPPED | OUTPATIENT
Start: 2022-06-23 | End: 2022-08-01 | Stop reason: SDUPTHER

## 2022-06-23 RX ORDER — METFORMIN HYDROCHLORIDE 500 MG/1
500 TABLET, EXTENDED RELEASE ORAL
Qty: 90 TABLET | Refills: 0
Start: 2022-06-23 | End: 2022-07-18

## 2022-06-23 ASSESSMENT — ENCOUNTER SYMPTOMS
SHORTNESS OF BREATH: 0
CONSTIPATION: 0
ABDOMINAL PAIN: 0
ABDOMINAL DISTENTION: 0
COUGH: 0
CHEST TIGHTNESS: 0

## 2022-06-23 NOTE — PATIENT INSTRUCTIONS
Patient Education      Patient Education      Patient Education        Learning About Low Blood Sugar (Hypoglycemia) in Diabetes  What is low blood sugar (hypoglycemia)? Hypoglycemia means that your blood sugar is low and your body (especially your brain) is not getting enough fuel. If you have diabetes, your blood sugar can go too low if you take too much of some diabetes medicines. It can also go too low if you miss a meal. And it can happen if you exercise too hard without eating enough food. Some medicines used to treat other health problems cancause low blood sugar too. What are the symptoms? Symptoms of low blood sugar can start quickly. It may take just 10 to 15 minutes. If you have had diabetes for many years, you may not realize that yourblood sugar is low until it drops very low.  If your blood sugar level drops below 70 (mild low blood sugar), you may feel tired, anxious, dizzy, weak, shaky, or sweaty. You may have a fast heartbeat or blurry vision.  If your blood sugar level continues to drop, your behavior may change. You may feel more irritable. You may find it hard to concentrate or talk. And you may feel unsteady when you stand or walk. You may become too weak or confused to eat something with sugar to raise your blood sugar level.  If your blood sugar level drops very low, you may pass out (lose consciousness). Or you may have a seizure or stroke. If you have symptoms of severe low blood sugar, you need to get medical care right away. If you had a low blood sugar level during the night, you may wake up tired or with a headache. Or you may sweat so much during the night that your pajamas orsheets are damp when you wake up. How is low blood sugar treated? You can treat low blood sugar by eating or drinking something that has 15 grams of carbohydrate. These should be quick-sugar foods.  Check your blood sugar level again 15 minutes after having a quick-sugar food to make sure your levelis getting back to your target range. Children usually need less than 15 grams of carbohydrate. Check with yourdoctor or diabetes educator for the amount that is right for your child. Here are examples of quick-sugar foods that have 15 grams of carbohydrate:   3 to 4 glucose tablets   1 tablespoon (3 teaspoons) of table sugar   1 tablespoon (3 teaspoons) honey   ½ cup to ¾ cup (4 to 6 ounces) of fruit juice or regular (not diet) soda   Hard candy (such as 6 Life Savers)  If you have problems with severe low blood sugar, someone else may have to giveyou glucagon. This is a hormone that raises blood sugar levels quickly. How can you prevent low blood sugar? You can take steps to prevent low blood sugar.  Follow your treatment plan. Take your insulin or other diabetes medicine exactly as your doctor prescribed it. Talk with your doctor if you're having low blood sugar often. Your medicine may need to be adjusted if it's causing your low blood sugar.  Check your blood sugar levels often. This helps you find early changes before an emergency happens.  Keep a quick-sugar food with you in case your blood sugar level drops low.  Eat small meals more often so that you don't get too hungry between meals. Don't skip meals.  Balance extra exercise with eating more. Check your blood sugar and learn how it changes after exercise. If your blood sugar stays at a normal level, you may not need to eat after you exercise.  Limit how much alcohol you drink. Alcohol can make low blood sugar go even lower. Don't drink alcohol if you have problems recognizing the early signs of low blood sugar.  Keep a diary of your symptoms. This helps you learn when changes in your body may signal low blood sugar. And keep track of how often you have low blood sugar, including when you last ate and what you ate. This will help you learn what causes your blood sugar to drop.  Learn about diabetes and low blood sugar.  Support groups or a diabetes education center can help you understand how medicines, diet, and exercise affect your blood sugar levels. Since low blood sugar levels can quickly become an emergency, be sure to wear medical alert jewelry, such as a medical alert bracelet. This is to let people know you have diabetes so they can get help for you. You can buy this at most drugstores. And make sure your family, friends, and coworkers know the symptomsof low blood sugar. Teach them what to do to get your sugar level up. Follow-up care is a key part of your treatment and safety. Be sure to make and go to all appointments, and call your doctor if you are having problems. It's also a good idea to know your test results and keep alist of the medicines you take. Where can you learn more? Go to https://chjoel.Zivity. org and sign in to your [employer] account. Enter X598 in the [employer] box to learn more about \"Learning About Low Blood Sugar (Hypoglycemia) in Diabetes. \"     If you do not have an account, please click on the \"Sign Up Now\" link. Current as of: July 28, 2021               Content Version: 13.3  © 4642-8824 Healthwise, [employer]. Care instructions adapted under license by [facility] ([facility]). If you have questions about a medical condition or this instruction, always ask your healthcare professional. [address] any warranty or liability for your use of this information. Learning About How to Use an Insulin Pen  What is an insulin pen? An insulin pen is a device for giving insulin shots. It looks like a pen. Both disposable and reusable insulin pens are available. For a reusable pen, you put the insulin cartridge into the pen. Disposable pens already have an insulin cartridge. You can set the dose of insulin with a dial on the outside of thepen. You use the pen to give the insulin shot (injection). Insulin pens can be used instead of bottles and syringes.   Before their first use, insulin pens or insulin cartridges are stored in the refrigerator. After that, you can store them at room temperature. In general, they  within a month after you open them. Follow the directions forstoring and using the insulin. How to use an insulin pen  Know which type of insulin pen you're using. Insulin pens are either reusable or disposable. For a reusable pen, you put the insulin cartridge into the pen. Disposable pens already have an insulincartridge. Before using cloudy insulin, such as NPH and premixed insulin, gently roll the pen between your palms 10 times. Then tip the pen up and down 10 times. Do notshake the pen. The insulin should look milky white. Attach the needle to the insulin pen. Follow the directions for how to screw a new needle onto your pen. Remove the outer cap from the needle. Keep this outer cap. You will use itlater to safely dispose of the needle. Remove the inner cover from the needle. Be careful not to prick yourself. Prime the needle. Before each shot, prime the needle. Priming removes air from the needle andhelps make sure you're getting the right dose. Turn the dose knob to 2 units or to the amount that your pen's manufacturerrecommends. Hold your pen with the needle pointing up. Tap the cartridge holcomb gently to move any air bubbles to the top. Push the injection button all the way in. Watch for a stream or drop of insulin to come out of the needle. If it doesn't,repeat this step. Pick a spot. Clean the area of skin where you will give the shot. If you use alcohol toclean the skin before you give the injection, let it dry. Use a different spot each time you inject insulin. Using the same spot everytime can cause bumps or pits to form in your skin. For example, inject your insulin above your belly button. Then the next timeuse your upper thigh, and then the next time inject below your belly button. Put it in.     Turn the dose knob to the number of units of insulin that you need to inject. Push the needle into your skin. Most people can inject using a 90-degree angleand without pinching the skin. Adults and children who are very lean and people who use longer needles mayneed to pinch the skin to avoid injecting into muscle. Inject and wait. Put your thumb on the injection button, and push it in until it stops. Keep the pen in your skin. Hold the dose knob in for 10 seconds (or to the number that the  recommends). Then pull the needle out of your skin. Do not rubthe area. Recap the insulin pen. Put only the outer cap back over the needle. The thin, inner cover is harder toput back on, and you may stick yourself. Throw the needle away. After covering the needle with the outer cap, unscrew the needle. Throw the needle away in a sharps container or other solid plastic container. You can geta sharps container at your drugstore. Don't share insulin pens with anyone else who uses insulin. Even when the needle is changed, an insulin pen can carry bacteria or blood that can makeanother person sick. Where can you learn more? Go to https://Eco Plastics.[email or web address] org and sign in to your "[employer]" account. Enter I100 in the [facility] box to learn more about \"Learning About How to Use an Insulin Pen. \"     If you do not have an account, please click on the \"Sign Up Now\" link. Current as of: July 28, 2021               Content Version: 13.3  © 2006-2022 [employer]. Care instructions adapted under license by [facility] ([facility]). If you have questions about a medical condition or this instruction, always ask your healthcare professional. [place] [address] any warranty or liability for your use of this information.          insulin glargine  Pronunciation: IN james mejia vega  Brand: Basaglmamadou MonroyikKeny, Lantus, Lantus Solostar Pen, Semglee, Toujeo SoloStar  What is the most important information I should know about insulin glargine? Never share an injection pen, even if you changed the needle. What is insulin glargine? Insulin glargine is a long-acting insulin that starts to work several hours after injection and keeps workingevenly for 24 hours. Insulin glargine is used to improve blood sugar control in people with diabetesmellitus. Dora Siad is for use in adults with type 1 or type 2 diabetes. Basaglar, Lantus, and Semglee are for use in adults with type 1 or 2 diabetes and in children at least 5years old with type 1 diabetes (not type 2). For type 1 diabetes, insulin glargine is used together with a short-acting insulin given before meals. Insulin glargine may also be used for purposes not listed in this medicationguide. What should I discuss with my healthcare provider before using insulin glargine? You should not use this medicine if you are allergic to insulin, or if you are having an episode of hypoglycemia (low blood sugar) or diabetic ketoacidosis(call your doctor for treatment). Insulin glargine is not approved for use by anyone younger than 10years old, and some brands are for use only in adults. Do not use this medicine to treat type 2 diabetes in a child of any age. Tell your doctor if you have ever had:   liver or kidney disease; or   heart failure or other heart problems. Tell your doctor if you also take pioglitazone or rosiglitazone (sometimes contained in combinations with glimepiride or metformin). Taking certain oral diabetes medicines while you are using insulin may increase your risk of serious heart problems. Tell your doctor if you are pregnant or breastfeeding. Follow your doctor's instructions about using this medicine if you are pregnant or you become pregnant. Controlling diabetes is very important during pregnancy. How should I use insulin glargine? Follow all directions on your prescription label and read all medication guidesor instruction sheets.  Use the medicine exactly as directed. Insulin glargine is injected under the skin, usually once per day at the sametime of day. When treating type 1 diabetes, use your short-acting insulin before meals as directed by your doctor. Insulin glargine must not be given with an insulin pump, or mixed with other insulins. Do not inject insulin glargine into a vein or a muscle. Ask your doctor or pharmacist if you don't understand how to use an injection. Prepare an injection only when you are ready to give it. Call your pharmacist if the medicine looks cloudy, has changed colors, or has particles in it. Your healthcare provider will show you where to inject insulin glargine. Do notinject into the same place two times in a row. Avoid injecting into skin that is damaged, tender, bruised, pitted, thickened,scaly, or has a scar or hard lump. Toujeo contains 300 units of insulin glargine per milliliter (mL), which is 3 timesstronger than brands that contain 100 units per mL. Your dose needs may change if you switch to a different brand, strength, or form of this medicine. Avoid medication errors by using only the medicine your doctor prescribes. If you use an injection pen, use only the injection pen that comes with insulin glargine. Attach a newneedle before each use. Do not transfer the insulin from the pen into a syringe. Never share an injection pen, even if you changed the needle. Sharing these devices can pass infections from person to person. Blood sugar can be affected by stress, illness, surgery, exercise, alcohol use,or skipping meals. Low blood sugar (hypoglycemia) can make you feel very hungry, dizzy, irritable, or shaky. To quickly treat hypoglycemia, eat or drink hard candy, crackers, raisins, fruit juice, or non-diet soda. Your doctor may prescribe glucagon injection in case of severehypoglycemia.   Tell your doctor if you have frequent symptoms of high blood sugar (hyperglycemia) such as increased thirst or urination. Ask your doctor before changing your medication dosage. Keep this medicine in its original container protected from heat and light. Do not freeze insulin or store it near the cooling element in a refrigerator. Throw away any insulin that has been frozen. Storing unopened (not in use) insulin glargine:    Refrigerate and use until expiration date; or   (Basaglar, Lantus, or Semglee) Store at room temperature (below 86 degrees Fahrenheit) and use within 28 days. Storing opened (in use) insulin glargine:    Store the vial in a refrigerator or at room temperature and use within 28 days.  Store the injection pen at room temperature (do not refrigerate) and use within 28 days.  Store Toujeo  at room temperature below 86 F (do not refrigerate) and use within 56 days. Do not store an injection pen with the needle attached. Do not reuse a needle or syringe. Place them in a puncture-proof \"sharps\" container and dispose of it following state or local laws. Keep out of thereach of children and pets. Wear a medical alert tag or carry an ID card to let others know you havediabetes. What happens if I miss a dose? Call your doctor for instructions if you miss a dose. Do not use more than one dose in a 24-hour period unless your doctor tells you to. Get your prescription refilled before you run out of medicine completely. What happens if I overdose? Seek emergency medical attention or call the Poison Help line at 1-817.397.3997. Insulin overdose can cause severe hypoglycemia. Symptoms include drowsiness, confusion, blurred vision, numbness or tingling in your mouth, trouble speaking, muscle weakness, clumsy or jerky movements,seizure (convulsions), or loss of consciousness. What should I avoid while using insulin glargine? Avoid driving or hazardous activity until you know how this medicine willaffect you. Your reactions could be impaired.   Avoid medication errors by always checking the medicine label before injectingyour insulin. Avoid drinking alcohol or using medicines that contain alcohol. It mayinterfere with your diabetes treatment. What are the possible side effects of insulin glargine? Get emergency medical help if you have signs of insulin allergy: redness or swelling where an injection was given, itchy skin rash over the entire body, trouble breathing, fast heartbeats, feeling like you might passout, or swelling in your tongue or throat. Call your doctor at once if you have:   rapid weight gain, swelling in your feet or ankles;   shortness of breath; or   low blood potassium --leg cramps, constipation, irregular heartbeats, fluttering in your chest, increased thirst or urination, numbness or tingling, muscle weakness or limp feeling. Common side effects may include:   low blood sugar;   swelling, weight gain;   allergic reaction, itching, rash; or   thickening or hollowing of the skin where you injected the medicine. This is not a complete list of side effects and others may occur. Call your doctor for medical advice about side effects. You may report side effects toFDA at [phone]. What other drugs will affect insulin glargine? Many drugs can affect your blood sugar and may also affect insulin glargine. This includes prescription and over-the-counter medicines, vitamins, and herbal products. Tell your doctor about all other medicines you use. Not all possible interactions are listed here. Where can I get more information? Your pharmacist can provide more information about insulin glargine. Remember, keep this and all other medicines out of the reach of children, never share your medicines with others, and use this medication only for the indication prescribed. Every effort has been made to ensure that the information provided by [address] is accurate, up-to-date, and complete, but no guarantee is made to that effect.  Drug information contained herein may be time sensitive. [employer] information has been compiled for use by healthcare practitioners and consumers in the [place] and therefore [employer] does not warrant that uses outside of the [place] are appropriate, unless specifically indicated otherwise. [facility]'s drug information does not endorse drugs, diagnose patients or recommend therapy. [facility] drug information is an informational resource designed to assist licensed healthcare practitioners in caring for their patients and/or to serve consumers viewing this service as a supplement to, and not a substitute for, the expertise, skill, knowledge and judgment of healthcare practitioners. The absence of a warning for a given drug or drug combination in no way should be construed to indicate that the drug or drug combination is safe, effective or appropriate for any given patient. [facility] does not assume any responsibility for any aspect of healthcare administered with the aid of information [facility] provides. The information contained herein is not intended to cover all possible uses, directions, precautions, warnings, drug interactions, allergic reactions, or adverse effects. If you have questions about the drugs you are taking, check with yourdoctor, nurse or pharmacist.  Copyright 4906-8427 [address]. Version: 15.02. Revision date:2/18/2022. Care instructions adapted under license by [facility] ([facility]). If you have questions about a medical condition or this instruction, always ask your healthcare professional. [place] [address] any warranty or liability for your use of this information.

## 2022-06-23 NOTE — PROGRESS NOTES
Chief Complaint   Patient presents with    Follow-up     4 week f/u for Diabetes. BS high in am.         Tamiko Mason is a 28 y.o. female who presents today for Follow-up (4 week f/u for Diabetes. BS high in am. )     Completed hemoglobin A1c yesterday, 6.4, down from 8, she has been taking the medication as prescribed, but has been noticing mood changes, and fatigue. She is ready to start insulin if necessary, she is going to be seeing endocrine in August, since last visit with me she has follow-up with urology, maternal-fetal medicine, and OB/GYN    Has been doing changes in diet, trying to be more active, reports hypoglycemia especially in the mornings. Wt Readings from Last 3 Encounters:   06/23/22 211 lb 3.2 oz (95.8 kg)   06/22/22 210 lb (95.3 kg)   06/09/22 215 lb (97.5 kg)     Vitals:    06/23/22 1043   BP: 118/64   Site: Right Upper Arm   Position: Sitting   Pulse: 78   Resp: 12   SpO2: 98%   Weight: 211 lb 3.2 oz (95.8 kg)   Height: 5' 6\" (1.676 m)        Assessment and plan:  1. Type 2 diabetes mellitus without complication, with long-term current use of insulin ([facility] [address]  Assessment & Plan:  She is not currently tolerating metformin 1000 mg twice a day,  Will decrease metformin to 500 mg extended release once a day  At insulin long-acting 10 units and titrate by 3 units every 3 days as tolerated without hypoglycemia symptoms to achieve fasting blood sugars below 120, and postprandial below 180  Lab Results   Component Value Date    LABA1C 6.4 (H) 06/22/2022     Lab Results   Component Value Date     06/22/2022       Orders:  -     metFORMIN (GLUCOPHAGE-XR) 500 MG extended release tablet; Take 1 tablet by mouth daily (with breakfast), Disp-90 tablet, R-0NO PRINT  -     insulin glargine (LANTUS SOLOSTAR) 100 UNIT/ML injection pen; Inject 10 Units into the skin nightly, Disp-5 pen, R-0Normal      Return in about 4 weeks (around 7/21/2022) for diabetes.      Review of Ht 5' 6\" (1.676 m)   Wt 211 lb 3.2 oz (95.8 kg)   LMP 06/05/2022 (Exact Date)   SpO2 98%   BMI 34.09 kg/m²     Physical Exam  Vitals reviewed. Constitutional:       Appearance: Normal appearance. Cardiovascular:      Rate and Rhythm: Normal rate and regular rhythm. Pulmonary:      Effort: Pulmonary effort is normal.      Breath sounds: Normal breath sounds. Neurological:      General: No focal deficit present. Mental Status: She is alert and oriented to person, place, and time. Psychiatric:         Mood and Affect: Mood normal.         Behavior: Behavior normal.          Recent labs:    No results found for: CHOL  No results found for: TRIG  No results found for: HDL  No results found for: LDLCHOLESTEROL, LDLCALC  No results found for: LABVLDL, VLDL  No results found for: [facility]  Lab Results   Component Value Date     05/03/2022    K 3.7 05/03/2022     (H) 05/03/2022    CO2 23 05/03/2022    BUN 19 05/03/2022    CREATININE 0.93 05/03/2022    GLUCOSE 153 (H) 05/03/2022    CALCIUM 10.0 05/03/2022    PROT 7.7 05/03/2022    LABALBU 3.7 05/03/2022    BILITOT 0.4 05/03/2022    ALKPHOS 74 05/03/2022    AST 14 (L) 05/03/2022    ALT 32 05/03/2022    GFRAA >60 05/03/2022    AGRATIO 0.9 (L) 05/03/2022    GLOB 4.0 (H) 05/03/2022     Lab Results   Component Value Date    WBC 10.8 05/03/2022    HGB 14.0 05/03/2022    HCT 41.5 05/03/2022    MCV 85.2 05/03/2022     05/03/2022             This document was generated with the aid of voice recognition software. . Please be aware that there may be inadvertent transcription errors not identified and corrected by the [employer]

## 2022-06-23 NOTE — ASSESSMENT & PLAN NOTE
She is not currently tolerating metformin 1000 mg twice a day,  Will decrease metformin to 500 mg extended release once a day  At insulin long-acting 10 units and titrate by 3 units every 3 days as tolerated without hypoglycemia symptoms to achieve fasting blood sugars below 120, and postprandial below 180  Lab Results   Component Value Date    LABA1C 6.4 (H) 06/22/2022     Lab Results   Component Value Date     06/22/2022

## 2022-06-23 NOTE — RESULT ENCOUNTER NOTE
Drastic improvement. FSBG still w/ elevated values from a OB standpoint. Would continue to shoot for the parameters we discussed in office.

## 2022-06-24 NOTE — OR NURSING
Patient verified name and . Order for consent not found in EHR; patient verifies procedure. Type 1b surgery, phone assessment complete. Orders not received. Labs per surgeon: none  Labs per anesthesia protocol: none    Patient answered medical/surgical history questions at their best of ability. All prior to admission medications documented in [facility]. Patient instructed to take the following medications the day of surgery according to anesthesia guidelines with a small sip of water: none. Will use 80% (8 units) insulin night before surgery. Hold all vitamins 7 days prior to surgery and NSAIDS 5 days prior to surgery. Prescription meds to hold:none    Patient instructed on the following:    > Arrive at [address], time of arrival to be called the day before by 1700  > NPO after midnight, unless otherwise indicated, including gum, mints, and ice chips  > Responsible adult must drive patient to the hospital, stay during surgery, and patient will need supervision 24 hours after anesthesia  > Use antibacterial soap in shower the night before surgery and on the morning of surgery  > All piercings must be removed prior to arrival.    > Leave all valuables (money and jewelry) at home but bring insurance card and ID on DOS.   > Do not wear make-up, nail polish, lotions, cologne, perfumes, powders, or oil on skin. Artificial nails are not permitted.

## 2022-06-27 ENCOUNTER — TELEPHONE (OUTPATIENT)
Dept: INTERNAL MEDICINE CLINIC | Facility: CLINIC | Age: 32
End: 2022-06-27

## 2022-06-27 DIAGNOSIS — E11.9 TYPE 2 DIABETES MELLITUS WITHOUT COMPLICATION, WITH LONG-TERM CURRENT USE OF INSULIN (HCC): Primary | ICD-10-CM

## 2022-06-27 DIAGNOSIS — Z79.4 TYPE 2 DIABETES MELLITUS WITHOUT COMPLICATION, WITH LONG-TERM CURRENT USE OF INSULIN (HCC): Primary | ICD-10-CM

## 2022-06-27 RX ORDER — PEN NEEDLE, DIABETIC 30 GX3/16"
1 NEEDLE, DISPOSABLE MISCELLANEOUS NIGHTLY
Qty: 100 EACH | Refills: 1 | Status: SHIPPED | OUTPATIENT
Start: 2022-06-27 | End: 2022-06-28 | Stop reason: SDUPTHER

## 2022-06-27 RX ORDER — PEN NEEDLE, DIABETIC 30 GX3/16"
NEEDLE, DISPOSABLE MISCELLANEOUS NIGHTLY
COMMUNITY
End: 2022-06-27 | Stop reason: SDUPTHER

## 2022-06-27 NOTE — TELEPHONE ENCOUNTER
Pt needs needles for Lantus use. Requested Prescriptions     Pending Prescriptions Disp Refills    Insulin Pen Needle (PEN NEEDLES) 30G X 5 MM MISC 100 each 1     Si pen by Does not apply route nightly Pt to use with Lantus nightly.      DX: E11.9

## 2022-06-28 DIAGNOSIS — Z79.4 TYPE 2 DIABETES MELLITUS WITHOUT COMPLICATION, WITH LONG-TERM CURRENT USE OF INSULIN (HCC): ICD-10-CM

## 2022-06-28 DIAGNOSIS — E11.9 TYPE 2 DIABETES MELLITUS WITHOUT COMPLICATION, WITH LONG-TERM CURRENT USE OF INSULIN (HCC): ICD-10-CM

## 2022-06-28 RX ORDER — PEN NEEDLE, DIABETIC 30 GX3/16"
1 NEEDLE, DISPOSABLE MISCELLANEOUS NIGHTLY
Qty: 100 EACH | Refills: 1 | Status: SHIPPED | OUTPATIENT
Start: 2022-06-28 | End: 2022-07-18 | Stop reason: SDUPTHER

## 2022-06-29 ENCOUNTER — ANESTHESIA EVENT (OUTPATIENT)
Dept: SURGERY | Age: 32
End: 2022-06-29
Payer: COMMERCIAL

## 2022-06-29 ENCOUNTER — OFFICE VISIT (OUTPATIENT)
Dept: UROLOGY | Age: 32
End: 2022-06-29
Payer: COMMERCIAL

## 2022-06-29 DIAGNOSIS — N20.0 KIDNEY STONE: Primary | ICD-10-CM

## 2022-06-29 LAB
BILIRUBIN, URINE, POC: NEGATIVE
BLOOD URINE, POC: NEGATIVE
GLUCOSE URINE, POC: NEGATIVE
KETONES, URINE, POC: NEGATIVE
LEUKOCYTE ESTERASE, URINE, POC: NEGATIVE
NITRITE, URINE, POC: NEGATIVE
PH, URINE, POC: 5.5 (ref 4.6–8)
PROTEIN,URINE, POC: NEGATIVE
SPECIFIC GRAVITY, URINE, POC: 1.03 (ref 1–1.03)
URINALYSIS CLARITY, POC: NORMAL
URINALYSIS COLOR, POC: NORMAL
UROBILINOGEN, POC: NORMAL

## 2022-06-29 PROCEDURE — 81003 URINALYSIS AUTO W/O SCOPE: CPT | Performed by: NURSE PRACTITIONER

## 2022-06-29 PROCEDURE — 99024 POSTOP FOLLOW-UP VISIT: CPT | Performed by: NURSE PRACTITIONER

## 2022-06-29 PROCEDURE — 74018 RADEX ABDOMEN 1 VIEW: CPT | Performed by: NURSE PRACTITIONER

## 2022-06-29 ASSESSMENT — ENCOUNTER SYMPTOMS
ABDOMINAL PAIN: 0
BACK PAIN: 0

## 2022-06-29 NOTE — PROGRESS NOTES
Gynecology Surgery History and Physical    Jessica Cruz  [id number]    Subjective:      CC: PreOp        HPI:    Robb Schaumann  is a 28 y.o. , G2 [phone] who is seen for PreOP visit for planned hysteroscopy D&C for c/o IMB, likely endometrial polyp. Pt was also recently diagnosed with uncontrolled T2DM. Patient's last menstrual period was 06/05/2022 (exact date).     PCP: Dr Jovana Fulton ([facility])     Contraception:  Condoms now after counseling w/ regards to recommendation on improving hgb A1C first  Has been trying to conceive for 2yrs          Hx irregular periods--occasionally skips a month  Will occur mostly monthly sporadically around 30 days but only having very light spotting x 3-5 days , no pain   No pain or vb w/ sex    Reports having 2 periods last month -- becoming more irregular now.            Imaging:  TVUS 5/19/22:  Uterus 118mL  EMS 15mm   MILDLY ENLARGED ANTEVERTED UTERUS  ENDOMETRIUM - 15. 06MM,ECHOGENIC AREA SEEN WITHIN  MEASURING 1.0 X 0.8 X 0.7CM POSSIBLE POLYP? RT OV- SEEN TA, APPEARS POLYCYSTIC, DIFFICULT TO EVALUATE  LT OV- SEEN TA, APPEARS WNL, DIFFICULT TO EVALUATE  NO FREE FLUID            New T2DM, BMI 35, PCOS:  Hemoglobin A1c significantly elevated at 8.5 (4/13/22)   S/p MFM preconception counseling  S/p referral to a diabetic MD      Going to see PCP tomorrow and will discuss insulin   Pt doesn't like Metformin --- feels bad on this      Fasting :  130's-150s   1hr PP: most all elevated; 202 max       Repeat hgb A1C today ___               GYN HISTORY:  As per HPI     Hx STDs:  Trich      Last Pap:  9/20/2016-- neg cytology, +Trich   Hx Abnl Paps:   Only as above     Past Medical History:   Diagnosis Date    Anemia 2009    in pregnancy    History of gestational diabetes 2016    Infertility associated with anovulation     Irregular menstrual bleeding     PCOS (polycystic ovarian syndrome)     Type 2 diabetes mellitus (HCC)     on oral and insulin, avg fbs 145, A1C 6.4 on 6/22/22 Past Surgical History:   Procedure Laterality Date     SECTION      LITHOTRIPSY  05/10/2022    ORTHOPEDIC SURGERY Left     Knee     OB History        2    Para   2    Term   2       0    AB   0    Living   2       SAB   0    IAB   0    Ectopic   0    Molar   0    Multiple   0    Live Births   2                Allergies   Allergen Reactions    Sulfa Antibiotics Rash       [unfilled]    Family History   Problem Relation Age of Onset    Breast Cancer Paternal Grandmother     Diabetes Paternal Grandfather     Colon Cancer Neg Hx     Cancer Paternal Grandmother     Diabetes Father     Ovarian Cancer Neg Hx      Social History     Socioeconomic History    Marital status: Single     Spouse name: Not on file    Number of children: Not on file    Years of education: Not on file    Highest education level: Not on file   Occupational History    Not on file   Tobacco Use    Smoking status: Never Smoker    Smokeless tobacco: Never Used   Vaping Use    Vaping Use: Never used   Substance and Sexual Activity    Alcohol use: No    Drug use: No    Sexual activity: Not on file   Other Topics Concern    Not on file   Social History Narrative    Not on file     Social Determinants of Health     Financial Resource Strain:     Difficulty of Paying Living Expenses: Not on file   Food Insecurity:     Worried About Running Out of Food in the Last Year: Not on file    Douglas of Food in the Last Year: Not on file   Transportation Needs:     Lack of Transportation (Medical): Not on file    Lack of Transportation (Non-Medical):  Not on file   Physical Activity:     Days of Exercise per Week: Not on file    Minutes of Exercise per Session: Not on file   Stress:     Feeling of Stress : Not on file   Social Connections:     Frequency of Communication with Friends and Family: Not on file    Frequency of Social Gatherings with Friends and Family: Not on file    Attends Moravian Services: Not on file    Active Member of Clubs or Organizations: Not on file    Attends Club or Organization Meetings: Not on file    Marital Status: Not on file   Intimate Partner Violence:     Fear of Current or Ex-Partner: Not on file    Emotionally Abused: Not on file    Physically Abused: Not on file    Sexually Abused: Not on file   Housing Stability:     Unable to Pay for Housing in the Last Year: Not on file    Number of [place] in the Last Year: Not on file    Unstable Housing in the Last Year: Not on file         Review of Systems:  Negative except as per HPI       PHYSICAL EXAM:  Visit Vitals  BP (!) 148/94   Ht 5' 6\" (1.676 m)   Wt 213 lb (96.6 kg)   LMP 2022   BMI 34.38 kg/m²         Constitutional: She appears well-developed and well-nourished. No distress. HENT:    Head: Normocephalic and atraumatic. Cardiovascular: Regular pulse   Pulmonary/Chest: Effort normal  Skin: She is not diaphoretic. Psychiatric: She has a normal mood and affect. Her behavior is normal. Thought content normal.                Counseling     Discussed how the actual planned surgery would be performed as well as the potential  risks of surgery including bleeding, infection, DVT, risks of surgical injuries to internal organs including but not limited to the bowels, bladder, rectum, and female reproductive organs.  The patient understands the risks, any and all questions were answered to the patient's satisfaction      Assessment/Plan:      28 y.o.,  with IMB, likely endometrial polyp:      -Posted for hysteroscopy D&C for likely polyp, IMB   -continue Prometerium 200mg QHS x 10days every month if no period on her own  -hgb A1C       Nataliya Mcdermott MD

## 2022-06-29 NOTE — H&P
Gynecology Surgery History and Physical    Alireza Malcolm  [id number]    Subjective:      CC: PreOp        HPI:    Ludin Bonds a 28 y.o. ,  who is seen for PreOP visit for planned hysteroscopy D&C for c/o IMB, likely endometrial polyp.  Pt was also recently diagnosed with uncontrolled T2DM.  Patient's last menstrual period was 2022 (exact date).     PCP: Dr Kristin Kwong ([facility])     Contraception: Tuan Tello now after counseling w/ regards to recommendation on improving hgb A1C first  Has been trying to conceive for 2yrs          Hx irregular periods--occasionally skips a month  Will occur mostly monthly sporadically around 30 days but only having very light spotting x 3-5 days , no pain   No pain or vb w/ sex    Reports having 2 periods last month -- becoming more irregular now.            Imaging:  TVUS 22:  Uterus 118mL  EMS 15mm   MILDLY ENLARGED ANTEVERTED UTERUS  ENDOMETRIUM - 15. 06MM,ECHOGENIC AREA SEEN WITHIN  MEASURING 1.0 X 0.8 X 0.7CM POSSIBLE POLYP?   RT OV- SEEN TA, APPEARS POLYCYSTIC, DIFFICULT TO EVALUATE  LT OV- SEEN TA, APPEARS WNL, DIFFICULT TO EVALUATE  NO FREE FLUID            New T2DM, BMI 35, PCOS:  Hemoglobin A1c significantly elevated at 8.5 (22)   S/p MFM preconception counseling  S/p referral to a diabetic MD      Going to see Deya Avina and will discuss insulin   Pt doesn't like Metformin --- feels bad on this      Fasting :  130's-150s   1hr PP: most all elevated; 202 max       Repeat hgb A1C today ___               GYN HISTORY:  As per HPI     Hx STDs:  Trich      Last Pap:  2016-- neg cytology, +Trich   Hx Abnl Paps:  Only as above     Past Medical History:   Diagnosis Date    Anemia 2009    in pregnancy    History of gestational diabetes 2016    Infertility associated with anovulation     Irregular menstrual bleeding     PCOS (polycystic ovarian syndrome)     Type 2 diabetes mellitus (HCC)     on oral and insulin, avg fbs 145, A1C 6.4 on 22 Past Surgical History:   Procedure Laterality Date     SECTION      LITHOTRIPSY  05/10/2022    ORTHOPEDIC SURGERY Left     Knee     OB History        2    Para   2    Term   2       0    AB   0    Living   2       SAB   0    IAB   0    Ectopic   0    Molar   0    Multiple   0    Live Births   2                Allergies   Allergen Reactions    Sulfa Antibiotics Rash       No current facility-administered medications on file prior to encounter. No current outpatient medications on file prior to encounter. Family History   Problem Relation Age of Onset    Breast Cancer Paternal Grandmother     Diabetes Paternal Grandfather     Colon Cancer Neg Hx     Cancer Paternal Grandmother     Diabetes Father     Ovarian Cancer Neg Hx      Social History     Socioeconomic History    Marital status: Single     Spouse name: Not on file    Number of children: Not on file    Years of education: Not on file    Highest education level: Not on file   Occupational History    Not on file   Tobacco Use    Smoking status: Never Smoker    Smokeless tobacco: Never Used   Vaping Use    Vaping Use: Never used   Substance and Sexual Activity    Alcohol use: No    Drug use: No    Sexual activity: Not on file   Other Topics Concern    Not on file   Social History Narrative    Not on file     Social Determinants of Health     Financial Resource Strain:     Difficulty of Paying Living Expenses: Not on file   Food Insecurity:     Worried About [address] [employer] in the Last Year: Not on file    Douglas of Food in the Last Year: Not on file   Transportation Needs:     Lack of Transportation (Medical): Not on file    Lack of Transportation (Non-Medical):  Not on file   Physical Activity:     Days of Exercise per Week: Not on file    Minutes of Exercise per Session: Not on file   Stress:     Feeling of Stress : Not on file   Social Connections:     Frequency of Communication with Friends and Family: Not on file    Frequency of Social Gatherings with Friends and Family: Not on file    Attends Nondenominational Services: Not on file    Active Member of Clubs or Organizations: Not on file    Attends Club or Organization Meetings: Not on file    Marital Status: Not on file   Intimate Partner Violence:     Fear of Current or Ex-Partner: Not on file    Emotionally Abused: Not on file    Physically Abused: Not on file    Sexually Abused: Not on file   Housing Stability:     Unable to Pay for Housing in the Last Year: Not on file    Number of [place] in the Last Year: Not on file    Unstable Housing in the Last Year: Not on file         Review of Systems:  Negative except as per HPI       PHYSICAL EXAM:  Visit Vitals  BP (!) 148/94   Ht 5' 6\" (1.676 m)   Wt 213 lb (96.6 kg)   LMP 2022   BMI 34.38 kg/m²         Constitutional: She appears well-developed and well-nourished. No distress. HENT:    Head: Normocephalic and atraumatic. Cardiovascular: Regular pulse   Pulmonary/Chest: Effort normal  Skin: She is not diaphoretic. Psychiatric: She has a normal mood and affect. Her behavior is normal. Thought content normal.                Counseling     Discussed how the actual planned surgery would be performed as well as the potential  risks of surgery including bleeding, infection, DVT, risks of surgical injuries to internal organs including but not limited to the bowels, bladder, rectum, and female reproductive organs.  The patient understands the risks, any and all questions were answered to the patient's satisfaction           Assessment/Plan:        28 y.o.,  with IMB, likely endometrial polyp:      -Posted for hysteroscopy D&C for likely polyp, IMB   -continue Prometerium 200mg QHS x 10days every month if no period on her own  -hgb Melanie Cobb MD

## 2022-06-29 NOTE — PROGRESS NOTES
[facility]  [address]  [address], [address] [facility]  [phone]          Álvaro Leon  : 1990    Chief Complaint   Patient presents with    Follow-up     4 weeks-Kidney stone          HPI     Álvaro Leon is a 28 y.o. female referred for ER follow up 22. Reports L flank pain and fever 3/22. Seen at urgent care. She reports xray was done and she was told she had no stones. Dx w L pyelonephritis and treated w antibiotics. Sx improved. I do not have these records.     She reports abrupt onset of L flank pain 5/3/22 (woke her from her sleep). [facility]. No prior h/o renal stones. Both mom and dad have long h/o renal stones. CT urogram showed L UPJ stone w mild obstruction. Add B renal stones noted. Images reviewed personally. sCr 0.93. Urine micro w hematuria but no bacteria.     KUB 22 showed L UPJ stone. Due to worsening pain, she opted to undergo L ESWL. Prev KUB showed fragmented L UPJ stone. She was feeling well. Opted for MET. Returns today for follow up. She is feeling good. No pain or gross hematuria. Past Medical History:   Diagnosis Date    Anemia     in pregnancy    History of gestational diabetes 2016    Infertility associated with anovulation     Irregular menstrual bleeding     PCOS (polycystic ovarian syndrome)     Type 2 diabetes mellitus (HCC)     on oral and insulin, avg fbs 145, A1C 6.4 on 22     Past Surgical History:   Procedure Laterality Date     SECTION      LITHOTRIPSY  05/10/2022    ORTHOPEDIC SURGERY Left     Knee     Current Outpatient Medications   Medication Sig Dispense Refill    Insulin Pen Needle (PEN NEEDLES) 30G X 5 MM MISC 1 pen by Does not apply route nightly Pt to use with Lantus nightly.      DX: E11.9 100 each 1    metFORMIN (GLUCOPHAGE-XR) 500 MG extended release tablet Take 1 tablet by mouth daily (with breakfast) 90 tablet 0    insulin glargine (LANTUS SOLOSTAR) 100 UNIT/ML injection pen Inject 10 Units into the skin nightly 5 pen 0    ACCU-CHEK GUIDE strip CHECK BLOOD SUGAR FASTING BEFORE BREAKFAST THEN 1 HOUR AFTER THE FIRST BITE OF EACH MEAL (BREAKFAST, LUNCH, DINNER) 100 strip 1    Accu-Chek Softclix Lancets MISC CHECK BLOOD SUGAR FOUR TIMES DAILY 120 each 1    tamsulosin (FLOMAX) 0.4 mg capsule Take 1 capsule by mouth daily 30 capsule 0     No current facility-administered medications for this visit. Allergies   Allergen Reactions    Sulfa Antibiotics Rash     Social History     Socioeconomic History    Marital status: Single     Spouse name: Not on file    Number of children: Not on file    Years of education: Not on file    Highest education level: Not on file   Occupational History    Not on file   Tobacco Use    Smoking status: Never Smoker    Smokeless tobacco: Never Used   Vaping Use    Vaping Use: Never used   Substance and Sexual Activity    Alcohol use: No    Drug use: No    Sexual activity: Not on file   Other Topics Concern    Not on file   Social History Narrative    Not on file     Social Determinants of Health     Financial Resource Strain:     Difficulty of Paying Living Expenses: Not on file   Food Insecurity:     Worried About Running Out of Food in the Last Year: Not on file    Douglas of Food in the Last Year: Not on file   Transportation Needs:     Lack of Transportation (Medical): Not on file    Lack of Transportation (Non-Medical):  Not on file   Physical Activity:     Days of Exercise per Week: Not on file    Minutes of Exercise per Session: Not on file   Stress:     Feeling of Stress : Not on file   Social Connections:     Frequency of Communication with Friends and Family: Not on file    Frequency of Social Gatherings with Friends and Family: Not on file    Attends Sabianist Services: Not on file    Active Member of Clubs or Organizations: Not on file    Attends Club or Organization Meetings: Not on ICD-10-CM    1. Kidney stone  N20.0 AMB POC URINALYSIS DIP STICK AUTO W/O MICRO     AMB POC XRAY ABDOMEN 1 VIEW       Urine normal. KUB reviewed w patient. We discussed surgical intervention vs OV/KUB in 1 month vs f/u GABRIELA (r/o obstruction). She opts for GABRIELA. Will call w results. Follow up pending this. Advised to call sooner if needed. Le Tian, SOMP, APRN - CNP  Dr. Jagjit Mcneil is supervising physician today and he approves plan of care.

## 2022-06-30 ENCOUNTER — ANESTHESIA (OUTPATIENT)
Dept: SURGERY | Age: 32
End: 2022-06-30
Payer: COMMERCIAL

## 2022-06-30 ENCOUNTER — HOSPITAL ENCOUNTER (OUTPATIENT)
Age: 32
Setting detail: OUTPATIENT SURGERY
Discharge: HOME | End: 2022-06-30
Attending: OBSTETRICS & GYNECOLOGY | Admitting: OBSTETRICS & GYNECOLOGY
Payer: COMMERCIAL

## 2022-06-30 VITALS
SYSTOLIC BLOOD PRESSURE: 130 MMHG | RESPIRATION RATE: 14 BRPM | DIASTOLIC BLOOD PRESSURE: 82 MMHG | WEIGHT: 210.7 LBS | HEART RATE: 69 BPM | TEMPERATURE: 97.5 F | HEIGHT: 66 IN | OXYGEN SATURATION: 99 % | BODY MASS INDEX: 33.86 KG/M2

## 2022-06-30 LAB
GLUCOSE BLD STRIP.AUTO-MCNC: 102 MG/DL (ref 65–100)
GLUCOSE BLD STRIP.AUTO-MCNC: 126 MG/DL (ref 65–100)
HCG UR QL: NEGATIVE
SERVICE CMNT-IMP: ABNORMAL
SERVICE CMNT-IMP: ABNORMAL

## 2022-06-30 PROCEDURE — 2500000003 HC RX 250 WO HCPCS: Performed by: NURSE ANESTHETIST, CERTIFIED REGISTERED

## 2022-06-30 PROCEDURE — 7100000001 HC PACU RECOVERY - ADDTL 15 MIN: Performed by: OBSTETRICS & GYNECOLOGY

## 2022-06-30 PROCEDURE — 82962 GLUCOSE BLOOD TEST: CPT

## 2022-06-30 PROCEDURE — 3700000001 HC ADD 15 MINUTES (ANESTHESIA): Performed by: OBSTETRICS & GYNECOLOGY

## 2022-06-30 PROCEDURE — 3600000003 HC SURGERY LEVEL 3 BASE: Performed by: OBSTETRICS & GYNECOLOGY

## 2022-06-30 PROCEDURE — 6360000002 HC RX W HCPCS: Performed by: ANESTHESIOLOGY

## 2022-06-30 PROCEDURE — 3600000013 HC SURGERY LEVEL 3 ADDTL 15MIN: Performed by: OBSTETRICS & GYNECOLOGY

## 2022-06-30 PROCEDURE — 3700000000 HC ANESTHESIA ATTENDED CARE: Performed by: OBSTETRICS & GYNECOLOGY

## 2022-06-30 PROCEDURE — 88305 TISSUE EXAM BY PATHOLOGIST: CPT

## 2022-06-30 PROCEDURE — 6370000000 HC RX 637 (ALT 250 FOR IP): Performed by: ANESTHESIOLOGY

## 2022-06-30 PROCEDURE — 7100000000 HC PACU RECOVERY - FIRST 15 MIN: Performed by: OBSTETRICS & GYNECOLOGY

## 2022-06-30 PROCEDURE — 58558 HYSTEROSCOPY BIOPSY: CPT | Performed by: OBSTETRICS & GYNECOLOGY

## 2022-06-30 PROCEDURE — 2709999900 HC NON-CHARGEABLE SUPPLY: Performed by: OBSTETRICS & GYNECOLOGY

## 2022-06-30 PROCEDURE — 6360000002 HC RX W HCPCS: Performed by: NURSE ANESTHETIST, CERTIFIED REGISTERED

## 2022-06-30 PROCEDURE — 81025 URINE PREGNANCY TEST: CPT

## 2022-06-30 PROCEDURE — 2580000003 HC RX 258: Performed by: ANESTHESIOLOGY

## 2022-06-30 PROCEDURE — 7100000011 HC PHASE II RECOVERY - ADDTL 15 MIN: Performed by: OBSTETRICS & GYNECOLOGY

## 2022-06-30 PROCEDURE — 7100000010 HC PHASE II RECOVERY - FIRST 15 MIN: Performed by: OBSTETRICS & GYNECOLOGY

## 2022-06-30 RX ORDER — SCOLOPAMINE TRANSDERMAL SYSTEM 1 MG/1
1 PATCH, EXTENDED RELEASE TRANSDERMAL
Status: DISCONTINUED | OUTPATIENT
Start: 2022-06-30 | End: 2022-06-30 | Stop reason: HOSPADM

## 2022-06-30 RX ORDER — FENTANYL CITRATE 50 UG/ML
INJECTION, SOLUTION INTRAMUSCULAR; INTRAVENOUS PRN
Status: DISCONTINUED | OUTPATIENT
Start: 2022-06-30 | End: 2022-06-30 | Stop reason: SDUPTHER

## 2022-06-30 RX ORDER — SODIUM CHLORIDE 0.9 % (FLUSH) 0.9 %
5-40 SYRINGE (ML) INJECTION EVERY 12 HOURS SCHEDULED
Status: DISCONTINUED | OUTPATIENT
Start: 2022-06-30 | End: 2022-06-30

## 2022-06-30 RX ORDER — FENTANYL CITRATE 50 UG/ML
25 INJECTION, SOLUTION INTRAMUSCULAR; INTRAVENOUS EVERY 5 MIN PRN
Status: DISCONTINUED | OUTPATIENT
Start: 2022-06-30 | End: 2022-06-30 | Stop reason: HOSPADM

## 2022-06-30 RX ORDER — OXYCODONE HYDROCHLORIDE 5 MG/1
10 TABLET ORAL PRN
Status: COMPLETED | OUTPATIENT
Start: 2022-06-30 | End: 2022-06-30

## 2022-06-30 RX ORDER — DEXAMETHASONE SODIUM PHOSPHATE 10 MG/ML
INJECTION INTRAMUSCULAR; INTRAVENOUS PRN
Status: DISCONTINUED | OUTPATIENT
Start: 2022-06-30 | End: 2022-06-30 | Stop reason: SDUPTHER

## 2022-06-30 RX ORDER — FENTANYL CITRATE 50 UG/ML
100 INJECTION, SOLUTION INTRAMUSCULAR; INTRAVENOUS
Status: DISCONTINUED | OUTPATIENT
Start: 2022-06-30 | End: 2022-06-30 | Stop reason: HOSPADM

## 2022-06-30 RX ORDER — ONDANSETRON 2 MG/ML
4 INJECTION INTRAMUSCULAR; INTRAVENOUS
Status: DISCONTINUED | OUTPATIENT
Start: 2022-06-30 | End: 2022-06-30 | Stop reason: HOSPADM

## 2022-06-30 RX ORDER — HYDROMORPHONE HYDROCHLORIDE 1 MG/ML
0.5 INJECTION, SOLUTION INTRAMUSCULAR; INTRAVENOUS; SUBCUTANEOUS EVERY 10 MIN PRN
Status: DISCONTINUED | OUTPATIENT
Start: 2022-06-30 | End: 2022-06-30 | Stop reason: HOSPADM

## 2022-06-30 RX ORDER — LIDOCAINE HYDROCHLORIDE 20 MG/ML
INJECTION, SOLUTION EPIDURAL; INFILTRATION; INTRACAUDAL; PERINEURAL PRN
Status: DISCONTINUED | OUTPATIENT
Start: 2022-06-30 | End: 2022-06-30 | Stop reason: SDUPTHER

## 2022-06-30 RX ORDER — OXYCODONE HYDROCHLORIDE 5 MG/1
5 TABLET ORAL PRN
Status: COMPLETED | OUTPATIENT
Start: 2022-06-30 | End: 2022-06-30

## 2022-06-30 RX ORDER — MIDAZOLAM HYDROCHLORIDE 2 MG/2ML
2 INJECTION, SOLUTION INTRAMUSCULAR; INTRAVENOUS
Status: COMPLETED | OUTPATIENT
Start: 2022-06-30 | End: 2022-06-30

## 2022-06-30 RX ORDER — DIPHENHYDRAMINE HYDROCHLORIDE 50 MG/ML
12.5 INJECTION INTRAMUSCULAR; INTRAVENOUS
Status: DISCONTINUED | OUTPATIENT
Start: 2022-06-30 | End: 2022-06-30 | Stop reason: HOSPADM

## 2022-06-30 RX ORDER — METOCLOPRAMIDE HYDROCHLORIDE 5 MG/ML
10 INJECTION INTRAMUSCULAR; INTRAVENOUS
Status: DISCONTINUED | OUTPATIENT
Start: 2022-06-30 | End: 2022-06-30 | Stop reason: HOSPADM

## 2022-06-30 RX ORDER — PROPOFOL 10 MG/ML
INJECTION, EMULSION INTRAVENOUS PRN
Status: DISCONTINUED | OUTPATIENT
Start: 2022-06-30 | End: 2022-06-30 | Stop reason: SDUPTHER

## 2022-06-30 RX ORDER — SODIUM CHLORIDE 0.9 % (FLUSH) 0.9 %
5-40 SYRINGE (ML) INJECTION PRN
Status: DISCONTINUED | OUTPATIENT
Start: 2022-06-30 | End: 2022-06-30

## 2022-06-30 RX ORDER — ACETAMINOPHEN 500 MG
1000 TABLET ORAL ONCE
Status: COMPLETED | OUTPATIENT
Start: 2022-06-30 | End: 2022-06-30

## 2022-06-30 RX ORDER — SODIUM CHLORIDE 9 MG/ML
INJECTION, SOLUTION INTRAVENOUS PRN
Status: DISCONTINUED | OUTPATIENT
Start: 2022-06-30 | End: 2022-06-30 | Stop reason: HOSPADM

## 2022-06-30 RX ORDER — SODIUM CHLORIDE 0.9 % (FLUSH) 0.9 %
5-40 SYRINGE (ML) INJECTION EVERY 12 HOURS SCHEDULED
Status: DISCONTINUED | OUTPATIENT
Start: 2022-06-30 | End: 2022-06-30 | Stop reason: HOSPADM

## 2022-06-30 RX ORDER — ONDANSETRON 2 MG/ML
INJECTION INTRAMUSCULAR; INTRAVENOUS PRN
Status: DISCONTINUED | OUTPATIENT
Start: 2022-06-30 | End: 2022-06-30 | Stop reason: SDUPTHER

## 2022-06-30 RX ORDER — SODIUM CHLORIDE, SODIUM LACTATE, POTASSIUM CHLORIDE, CALCIUM CHLORIDE 600; 310; 30; 20 MG/100ML; MG/100ML; MG/100ML; MG/100ML
INJECTION, SOLUTION INTRAVENOUS CONTINUOUS
Status: DISCONTINUED | OUTPATIENT
Start: 2022-06-30 | End: 2022-06-30 | Stop reason: HOSPADM

## 2022-06-30 RX ORDER — SODIUM CHLORIDE 0.9 % (FLUSH) 0.9 %
5-40 SYRINGE (ML) INJECTION PRN
Status: DISCONTINUED | OUTPATIENT
Start: 2022-06-30 | End: 2022-06-30 | Stop reason: HOSPADM

## 2022-06-30 RX ADMIN — MIDAZOLAM 2 MG: 1 INJECTION INTRAMUSCULAR; INTRAVENOUS at 11:06

## 2022-06-30 RX ADMIN — ONDANSETRON 4 MG: 2 INJECTION INTRAMUSCULAR; INTRAVENOUS at 11:43

## 2022-06-30 RX ADMIN — SODIUM CHLORIDE, POTASSIUM CHLORIDE, SODIUM LACTATE AND CALCIUM CHLORIDE: 600; 310; 30; 20 INJECTION, SOLUTION INTRAVENOUS at 11:06

## 2022-06-30 RX ADMIN — ACETAMINOPHEN 1000 MG: 500 TABLET, FILM COATED ORAL at 10:53

## 2022-06-30 RX ADMIN — OXYCODONE 5 MG: 5 TABLET ORAL at 12:28

## 2022-06-30 RX ADMIN — PROPOFOL 200 MG: 10 INJECTION, EMULSION INTRAVENOUS at 11:32

## 2022-06-30 RX ADMIN — FENTANYL CITRATE 50 MCG: 50 INJECTION INTRAMUSCULAR; INTRAVENOUS at 11:32

## 2022-06-30 RX ADMIN — LIDOCAINE HYDROCHLORIDE 100 MG: 20 INJECTION, SOLUTION EPIDURAL; INFILTRATION; INTRACAUDAL; PERINEURAL at 11:32

## 2022-06-30 RX ADMIN — FENTANYL CITRATE 50 MCG: 50 INJECTION INTRAMUSCULAR; INTRAVENOUS at 11:46

## 2022-06-30 RX ADMIN — DEXAMETHASONE SODIUM PHOSPHATE 5 MG: 10 INJECTION INTRAMUSCULAR; INTRAVENOUS at 11:43

## 2022-06-30 ASSESSMENT — PAIN DESCRIPTION - LOCATION: LOCATION: ABDOMEN

## 2022-06-30 ASSESSMENT — PAIN DESCRIPTION - DESCRIPTORS: DESCRIPTORS: CRAMPING

## 2022-06-30 ASSESSMENT — PAIN DESCRIPTION - ORIENTATION: ORIENTATION: LOWER

## 2022-06-30 ASSESSMENT — PAIN SCALES - GENERAL: PAINLEVEL_OUTOF10: 4

## 2022-06-30 NOTE — OP NOTE
Operative Note    HYSTEROSCOPY DILATATION & CURETTAGE FULL OP NOTE          Name: Richard Trejo    Medical Record Number: [id number]    YOB: 1990    DATE OF PROCEDURE:  2022      PREOPERATIVE DIAGNOSIS:  27 yo  with IMB, likely endometrial polyp     POSTOPERATIVE DIAGNOSIS:  NARCISO     PROCEDURE: Hysteroscopy, dilatation & curettage     SURGEON:  Chapis Castañeda MD    ASSISTANT:  None     ANESTHESIA: general    EBL: 5 cc    FINDINGS: few endometrial polyps; otherwise normal intrauterine anatomy     SPECIMENS: Endometrial curretings and polyps     ATIBIOTICS:  NONE    PROCEDURE IN DETAIL: The patient was placed on the Operating Room table in the supine position. The patient underwent general endotracheal anesthesia and was repositioned in the dorsal lithotomy position, prepped and draped in the usual sterile fashion for vaginal surgery. Time out was done to confirm the operating procedure, surgeon, patient and site. Once confirmed by the team, procedure was started. The cervix was exposed with a weighted vaginal speculum and grasped with a tenaculum. The uterus was sounded to 10 cm and the cervix dilated to [address]. Diagnostic hysteroscope was introduced into the endometrial cavity using saline. The findings were noted as above. A gio stone polyp forceps  was used inially to remove the intrauterine polyps followed by a thorough endometrial curettage with a serrated currette. Repeat curettage was performed as needed following reinspection of the uterine cavity with a hysteroscope using saline as a distention median was accomplished without problems. The endometrial cavity appeared normal following the curettage. Following the case, the hysteroscope was removed. The tenaculum was removed from the cervix. Hemostasis was assured. All instruments and retractors were removed from her vagina.   She was then cleaned up, awakened, and transferred to the Recovery Room in satisfactory condition. All counts were correct.     Jillian Swartz MD  June 30, 2022  12:19 PM

## 2022-06-30 NOTE — ANESTHESIA POSTPROCEDURE EVALUATION
Department of Anesthesiology  Postprocedure Note    Patient: Tamiko Mason  MRN: [id number]  YOB: 1990  Date of evaluation: 6/30/2022      Procedure Summary     Date: 06/30/22 [place] [facility] / [facility] [place]    Anesthesia Start: 7973 Anesthesia Stop: 6776    Procedure: DILATATION AND CURETTAGE HYSTEROSCOPY (N/A Vagina ) Diagnosis:       Menorrhagia with regular cycle      (Erika Jorge)    Surgeons: Martínez Sam MD Responsible Provider: Agnieszka Lim MD    Anesthesia Type: general ASA Status: 2          Anesthesia Type: No value filed.     Jo-Ann Phase I: Jo-Ann Score: 9    Jo-Ann Phase II:        Anesthesia Post Evaluation    Patient location during evaluation: PACU  Patient participation: complete - patient participated  Level of consciousness: awake and awake and alert  Airway patency: patent  Nausea & Vomiting: no nausea  Complications: no  Cardiovascular status: hemodynamically stable  Respiratory status: acceptable  Hydration status: euvolemic

## 2022-06-30 NOTE — ANESTHESIA PRE PROCEDURE
Department of Anesthesiology  Preprocedure Note       Name:  Latoya Higgins   Age:  28 y.o.  :  1990                                          MRN:  [id number]         Date:  2022      Surgeon: Tonny Gipson):  Sushant Cabrera MD    Procedure: Procedure(s):  DILATATION AND CURETTAGE HYSTEROSCOPY    Medications prior to admission:   Prior to Admission medications    Medication Sig Start Date End Date Taking? Authorizing Provider   Insulin Pen Needle (PEN NEEDLES) 30G X 5 MM MISC 1 pen by Does not apply route nightly Pt to use with Lantus nightly.      DX: E11.9 22  BECKY Hess CNP   metFORMIN (GLUCOPHAGE-XR) 500 MG extended release tablet Take 1 tablet by mouth daily (with breakfast) 22   Sayra Travis MD   insulin glargine (LANTUS SOLOSTAR) 100 UNIT/ML injection pen Inject 10 Units into the skin nightly 22   Izzy Amin MD   ACCU-CHEK GUIDE strip CHECK BLOOD SUGAR FASTING BEFORE BREAKFAST THEN 1 HOUR AFTER THE FIRST BITE OF EACH MEAL (BREAKFAST, LUNCH, DINNER) 22   Sushant Cabrera MD   Accu-Chek Softclix Lancets [facility] CHECK BLOOD SUGAR FOUR TIMES DAILY 22   Sushant Cabrera MD   tamsulosin [facility]) 0.4 mg capsule Take 1 capsule by mouth daily 22   BECKY Jolly - CNP       Current medications:    Current Facility-Administered Medications   Medication Dose Route Frequency Provider Last Rate Last Admin    sodium chloride flush 0.9 % injection 5-40 mL  5-40 mL IntraVENous 2 times per day Sushant Cabrera MD        sodium chloride flush 0.9 % injection 5-40 mL  5-40 mL IntraVENous PRN Sushant Cabrera MD        0.9 % sodium chloride infusion   IntraVENous PRN Sushant Cabrera MD        acetaminophen (TYLENOL) tablet 1,000 mg  1,000 mg Oral Once Jyotsna Helm MD        fentaNYL (SUBLIMAZE) injection 100 mcg  100 mcg IntraVENous Once PRN Jyotsna Helm MD        scopolamine (TRANSDERM-SCOP) transdermal patch 1 patch  1 patch TransDERmal Q72H Kecia De Guzman MD        lactated ringers infusion   IntraVENous Continuous Kecia De Guzman MD        midazolam PF (VERSED) injection 2 mg  2 mg IntraVENous Once PRN Kecia De Guzman MD           Allergies: Allergies   Allergen Reactions    Sulfa Antibiotics Rash       Problem List:    Patient Active Problem List   Diagnosis Code    History of gestational diabetes Z80.34    Type 2 diabetes mellitus ([facility] [address] E11.9    PCOS (polycystic ovarian syndrome) E28.2    Obesity (BMI 30-39. 9) E66.9    Irregular menstrual bleeding N92.6    Infertility associated with anovulation N97.0    History of kidney stones Z87.442       Past Medical History:        Diagnosis Date    Anemia 2009    in pregnancy    History of gestational diabetes 2016    Infertility associated with anovulation     Irregular menstrual bleeding     PCOS (polycystic ovarian syndrome)     Type 2 diabetes mellitus (HCC)     on oral and insulin, avg fbs 145, A1C 6.4 on 22       Past Surgical History:        Procedure Laterality Date     SECTION      LITHOTRIPSY  05/10/2022    ORTHOPEDIC SURGERY Left     Knee       Social History:    Social History     Tobacco Use    Smoking status: Never Smoker    Smokeless tobacco: Never Used   Substance Use Topics    Alcohol use:  No                                Counseling given: Not Answered      Vital Signs (Current):   Vitals:    22 1004 22 1021   BP:  (!) 128/90   Pulse:  73   Resp:  16   Temp:  98.2 °F (36.8 °C)   TempSrc:  Temporal   SpO2:  99%   Weight: 211 lb (95.7 kg) 210 lb 11.2 oz (95.6 kg)   Height: 5' 6\" (1.676 m)                                               BP Readings from Last 3 Encounters:   22 (!) 128/90   22 118/64   22 118/74       NPO Status:                                                                                 BMI:   Wt Readings from Last 3 Encounters:   22 210 lb 11.2 oz (95.6 kg) 06/23/22 211 lb 3.2 oz (95.8 kg)   06/22/22 210 lb (95.3 kg)     Body mass index is 34.01 kg/m². CBC:   Lab Results   Component Value Date/Time    WBC 10.8 05/03/2022 05:58 AM    RBC 4.87 05/03/2022 05:58 AM    HGB 14.0 05/03/2022 05:58 AM    HCT 41.5 05/03/2022 05:58 AM    MCV 85.2 05/03/2022 05:58 AM    RDW 12.6 05/03/2022 05:58 AM     05/03/2022 05:58 AM       CMP:   Lab Results   Component Value Date/Time     05/03/2022 05:58 AM    K 3.7 05/03/2022 05:58 AM     05/03/2022 05:58 AM    CO2 23 05/03/2022 05:58 AM    BUN 19 05/03/2022 05:58 AM    CREATININE 0.93 05/03/2022 05:58 AM    GFRAA >60 05/03/2022 05:58 AM    AGRATIO 0.9 05/03/2022 05:58 AM    GLUCOSE 153 05/03/2022 05:58 AM    PROT 7.7 05/03/2022 05:58 AM    CALCIUM 10.0 05/03/2022 05:58 AM    BILITOT 0.4 05/03/2022 05:58 AM    ALKPHOS 74 05/03/2022 05:58 AM    AST 14 05/03/2022 05:58 AM    ALT 32 05/03/2022 05:58 AM       POC Tests: No results for input(s): POCGLU, POCNA, POCK, POCCL, POCBUN, POCHEMO, POCHCT in the last 72 hours.     Coags: No results found for: PROTIME, INR, APTT    HCG (If Applicable): No results found for: PREGTESTUR, PREGSERUM, HCG, HCGQUANT     ABGs: No results found for: PHART, PO2ART, CIW8STU, GTA7UTY, BEART, A2BYAHKW     Type & Screen (If Applicable):  No results found for: LABABO, LABRH    Drug/Infectious Status (If Applicable):  No results found for: HIV, HEPCAB    COVID-19 Screening (If Applicable): No results found for: COVID19        Anesthesia Evaluation  Patient summary reviewed and Nursing notes reviewed  Airway: Mallampati: II  TM distance: <3 FB   Neck ROM: full     Dental:          Pulmonary:Negative Pulmonary ROS and normal exam                               Cardiovascular:  Exercise tolerance: good (>4 METS),           Rhythm: regular  Rate: normal                    Neuro/Psych:   Negative Neuro/Psych ROS              GI/Hepatic/Renal: Neg GI/Hepatic/Renal ROS            Endo/Other: Negative Endo/Other ROS   (+) DiabetesType II DM, using insulin, . Pt had no PAT visit       Abdominal:             Vascular: negative vascular ROS. Other Findings:           Anesthesia Plan      general     ASA 2       Induction: intravenous. Anesthetic plan and risks discussed with patient.       Plan discussed with surgical team.                    Yu Guadarrama MD   6/30/2022

## 2022-07-01 NOTE — RESULT ENCOUNTER NOTE
FRAGMENTS OF NON-SECRETORY ENDOMETRIUM IN ASSOCIATION WITH ONE   FRAGMENT OF BENIGN SMOOTH MUSCLE POSSIBLY REPRESENTING SUBMUCOSAL   LEIOMYOMA

## 2022-07-14 ENCOUNTER — OFFICE VISIT (OUTPATIENT)
Dept: OBGYN CLINIC | Age: 32
End: 2022-07-14

## 2022-07-14 VITALS — DIASTOLIC BLOOD PRESSURE: 76 MMHG | BODY MASS INDEX: 34.06 KG/M2 | WEIGHT: 211 LBS | SYSTOLIC BLOOD PRESSURE: 114 MMHG

## 2022-07-14 DIAGNOSIS — Z09 POSTOP CHECK: Primary | ICD-10-CM

## 2022-07-14 PROCEDURE — 99024 POSTOP FOLLOW-UP VISIT: CPT | Performed by: OBSTETRICS & GYNECOLOGY

## 2022-07-14 NOTE — PROGRESS NOTES
CC:  Postop follow-up      HPI:  Rajendra Briones presents for postop visit from Hysteroscopy, dilatation & curettage  on 6/30/22 secondary to IMB, likely endometrial polyp    Op findings:  few endometrial polyps; otherwise normal intrauterine anatomy     Pathology:  FRAGMENTS OF NON-SECRETORY ENDOMETRIUM IN ASSOCIATION WITH ONE FRAGMENT OF BENIGN SMOOTH MUSCLE POSSIBLY REPRESENTING SUBMUCOSAL LEIOMYOMA       No pain  Spotting some for the first few days  No fevers  Denies any issues at all      Seeing new PCP on Monday w/ regards to her T2DM   Last hgbA1C improved to 8.5 -->6.4. Scheduled to see endocrinology on 8/1/22. Patient has been counseled on different parameters that we will have in pregnancy as opposed to outside of pregnancy. Sees me again on 8/17 and wants to have hgb A1C rechecked at that time and to re-discuss pursuing fertility at that time. Stay on PNV          PE:  /76   Wt 211 lb (95.7 kg)   BMI 34.06 kg/m²      Physical Exam:  Constitutional: She appears well-developed and well-nourished. No distress. HENT:    Head: Normocephalic and atraumatic. Cardiovascular: Regular pulse   Pulmonary/Chest: Effort normal  Skin: She is not diaphoretic. Psychiatric: She has a normal mood and affect.  Her behavior is normal. Thought content normal. .      A/P:  Stable Post op condition     Cleared from a postoperative standpoint  FU after meeting with PCP and endocrine and will further discuss fertility efforts      Luiz Riedel, MD

## 2022-07-18 ENCOUNTER — OFFICE VISIT (OUTPATIENT)
Dept: FAMILY MEDICINE CLINIC | Facility: CLINIC | Age: 32
End: 2022-07-18
Payer: COMMERCIAL

## 2022-07-18 VITALS
DIASTOLIC BLOOD PRESSURE: 72 MMHG | HEIGHT: 66 IN | WEIGHT: 208 LBS | OXYGEN SATURATION: 98 % | HEART RATE: 68 BPM | RESPIRATION RATE: 12 BRPM | TEMPERATURE: 98 F | SYSTOLIC BLOOD PRESSURE: 118 MMHG | BODY MASS INDEX: 33.43 KG/M2

## 2022-07-18 DIAGNOSIS — R45.89 FEELING DOWN: Primary | ICD-10-CM

## 2022-07-18 DIAGNOSIS — Z79.4 TYPE 2 DIABETES MELLITUS WITHOUT COMPLICATION, WITH LONG-TERM CURRENT USE OF INSULIN (HCC): ICD-10-CM

## 2022-07-18 DIAGNOSIS — E11.9 TYPE 2 DIABETES MELLITUS WITHOUT COMPLICATION, WITH LONG-TERM CURRENT USE OF INSULIN (HCC): ICD-10-CM

## 2022-07-18 PROCEDURE — 3044F HG A1C LEVEL LT 7.0%: CPT | Performed by: FAMILY MEDICINE

## 2022-07-18 PROCEDURE — 99204 OFFICE O/P NEW MOD 45 MIN: CPT | Performed by: FAMILY MEDICINE

## 2022-07-18 RX ORDER — INSULIN GLARGINE-YFGN 100 [IU]/ML
INJECTION, SOLUTION SUBCUTANEOUS
COMMUNITY
Start: 2022-06-23 | End: 2022-07-18

## 2022-07-18 RX ORDER — PEN NEEDLE, DIABETIC 30 GX3/16"
1 NEEDLE, DISPOSABLE MISCELLANEOUS NIGHTLY
Qty: 100 EACH | Refills: 1 | Status: SHIPPED | OUTPATIENT
Start: 2022-07-18 | End: 2022-10-28 | Stop reason: SDUPTHER

## 2022-07-18 SDOH — HEALTH STABILITY: PHYSICAL HEALTH: ON AVERAGE, HOW MANY DAYS PER WEEK DO YOU ENGAGE IN MODERATE TO STRENUOUS EXERCISE (LIKE A BRISK WALK)?: 0 DAYS

## 2022-07-18 SDOH — HEALTH STABILITY: PHYSICAL HEALTH: ON AVERAGE, HOW MANY MINUTES DO YOU ENGAGE IN EXERCISE AT THIS LEVEL?: 0 MIN

## 2022-07-18 ASSESSMENT — PATIENT HEALTH QUESTIONNAIRE - PHQ9
SUM OF ALL RESPONSES TO PHQ QUESTIONS 1-9: 0
2. FEELING DOWN, DEPRESSED OR HOPELESS: 0
SUM OF ALL RESPONSES TO PHQ9 QUESTIONS 1 & 2: 0
SUM OF ALL RESPONSES TO PHQ QUESTIONS 1-9: 0
1. LITTLE INTEREST OR PLEASURE IN DOING THINGS: 0
SUM OF ALL RESPONSES TO PHQ QUESTIONS 1-9: 0
SUM OF ALL RESPONSES TO PHQ QUESTIONS 1-9: 0

## 2022-07-18 ASSESSMENT — ANXIETY QUESTIONNAIRES
IF YOU CHECKED OFF ANY PROBLEMS ON THIS QUESTIONNAIRE, HOW DIFFICULT HAVE THESE PROBLEMS MADE IT FOR YOU TO DO YOUR WORK, TAKE CARE OF THINGS AT HOME, OR GET ALONG WITH OTHER PEOPLE: NOT DIFFICULT AT ALL
1. FEELING NERVOUS, ANXIOUS, OR ON EDGE: 0
6. BECOMING EASILY ANNOYED OR IRRITABLE: 0
4. TROUBLE RELAXING: 0
5. BEING SO RESTLESS THAT IT IS HARD TO SIT STILL: 0
GAD7 TOTAL SCORE: 0
3. WORRYING TOO MUCH ABOUT DIFFERENT THINGS: 0
2. NOT BEING ABLE TO STOP OR CONTROL WORRYING: 0
7. FEELING AFRAID AS IF SOMETHING AWFUL MIGHT HAPPEN: 0

## 2022-07-18 ASSESSMENT — ENCOUNTER SYMPTOMS
SHORTNESS OF BREATH: 0
COUGH: 0

## 2022-07-18 NOTE — PROGRESS NOTES
Ghulam Montano (: 1990) is a 28 y.o. female, new patient, here for evaluation of the following chief complaint(s):  Establish Care and Diabetes (Has Blood Sugar numbers)       ASSESSMENT/PLAN:  1. Feeling down  2. Type 2 diabetes mellitus without complication, with long-term current use of insulin (HCC)  -     Insulin Pen Needle (PEN NEEDLES) 30G X 5 MM MISC; 1 pen by Does not apply route nightly Pt to use with Lantus nightly. DX: E11.9, Disp-100 each, R-1Normal      Reviewed blood sugars with her today, will stay at 22 units daily. Will defer to endocrinology if any adjustments are needed. Discussed feeling down, possibility of depression. Patient will monitor symptoms, recommended first trying counseling and/or therapy. If no improvement made then consider medications. Patient will monitor for now. Return if symptoms worsen or fail to improve. SUBJECTIVE/OBJECTIVE:  HPI    26-year-old female with new diagnosis of diabetes in April. She has been working with OB/GYN on issues with fertility. She had blood work done that revealed type 2 diabetes. She is currently on Lantus 22 units daily. Her fasting blood sugars have been around 100-140. Last A1c was 6.4. She has had 180, no highs above 191. She has appoint with endocrinology . She has a follow-up appointment with OB/GYN also later in August.            [facility]   has a past medical history of Anemia, History of gestational diabetes, Infertility associated with anovulation, Irregular menstrual bleeding, Nephrolithiasis, PCOS (polycystic ovarian syndrome), Poor dentition, and Type 2 diabetes mellitus ([address]     Past Surgical History:   Procedure Laterality Date    CERVICAL POLYP REMOVAL  2022     SECTION      DILATION AND CURETTAGE OF UTERUS N/A 2022    DILATATION AND CURETTAGE HYSTEROSCOPY performed by Nataliya Mcdermott MD at [facility]    LITHOTRIPSY  05/10/2022    LITHOTRIPSY Left     ORTHOPEDIC SURGERY Left     Knee       Current Outpatient Medications on File Prior to Visit   Medication Sig Dispense Refill    ACCU-CHEK GUIDE strip CHECK BLOOD SUGAR FASTING BEFORE BREAKFAST THEN 1 HOUR AFTER THE FIRST BITE OF EACH MEAL (BREAKFAST, LUNCH, DINNER) 100 strip 1    Accu-Chek Softclix Lancets MISC CHECK BLOOD SUGAR FOUR TIMES DAILY 120 each 1    insulin glargine (LANTUS SOLOSTAR) 100 UNIT/ML injection pen Inject 10 Units into the skin nightly (Patient taking differently: Inject 22 Units into the skin nightly) 5 pen 0     No current facility-administered medications on file prior to visit.        Social History     Socioeconomic History    Marital status: Single     Spouse name: Not on file    Number of children: Not on file    Years of education: Not on file    Highest education level: Not on file   Occupational History    Not on file   Tobacco Use    Smoking status: Never    Smokeless tobacco: Never   Substance and Sexual Activity    Alcohol use: No    Drug use: No    Sexual activity: Not on file   Other Topics Concern    Not on file   Social History Narrative    Not on file     Social Determinants of Health     Financial Resource Strain: Not on file   Food Insecurity: Not on file   Transportation Needs: Not on file   Physical Activity: Inactive    Days of Exercise per Week: 0 days    Minutes of Exercise per Session: 0 min   Stress: Not on file   Social Connections: Not on file   Intimate Partner Violence: Not At Risk    Fear of Current or Ex-Partner: No    Emotionally Abused: No    Physically Abused: No    Sexually Abused: No   Housing Stability: Not on file       Family History   Problem Relation Age of Onset    Diabetes Father     Breast Cancer Paternal Grandmother     Cancer Paternal Grandmother     Diabetes Paternal Grandfather     Colon Cancer Neg Hx     Ovarian Cancer Neg Hx          ALLERGIES  Allergies   Allergen Reactions    Sulfa Antibiotics Rash       PREVIOUS PRIMARY CARE PROVIDER  Shamika NUNEZ

## 2022-07-18 NOTE — PROGRESS NOTES
Rafat Whitfield (: 1990) is a 28 y.o. female, new patient, here for evaluation of the following chief complaint(s):  Est Care  DM     ASSESSMENT/PLAN:  {There are no diagnoses linked to this encounter. (Refresh or delete this SmartLink)}    No follow-ups on file. SUBJECTIVE/OBJECTIVE:  HPI    PM[facility]   has a past medical history of Anemia, History of gestational diabetes, Infertility associated with anovulation, Irregular menstrual bleeding, Nephrolithiasis, PCOS (polycystic ovarian syndrome), Poor dentition, and Type 2 diabetes mellitus ([address] Past Surgical History:   Procedure Laterality Date    CERVICAL POLYP REMOVAL  2022     SECTION      DILATION AND CURETTAGE OF UTERUS N/A 2022    DILATATION AND CURETTAGE HYSTEROSCOPY performed by Camila Brownlee MD at [facility]    LITHOTRIPSY  05/10/2022    LITHOTRIPSY Left     ORTHOPEDIC SURGERY Left     Knee       Current Outpatient Medications on File Prior to Visit   Medication Sig Dispense Refill    Insulin Pen Needle (PEN NEEDLES) 30G X 5 MM MISC 1 pen by Does not apply route nightly Pt to use with Lantus nightly.      DX: E11.9 100 each 1    ACCU-CHEK GUIDE strip CHECK BLOOD SUGAR FASTING BEFORE BREAKFAST THEN 1 HOUR AFTER THE FIRST BITE OF EACH MEAL (BREAKFAST, LUNCH, DINNER) 100 strip 1    Accu-Chek Softclix Lancets MISC CHECK BLOOD SUGAR FOUR TIMES DAILY 120 each 1    Insulin Glargine-yfgn 100 UNIT/ML SOPN INJECT 10 UNITS UNDER THE SKIN EVERY NIGHT      metFORMIN (GLUCOPHAGE-XR) 500 MG extended release tablet Take 1 tablet by mouth daily (with breakfast) (Patient not taking: Reported on 2022) 90 tablet 0    insulin glargine (LANTUS SOLOSTAR) 100 UNIT/ML injection pen Inject 10 Units into the skin nightly (Patient taking differently: Inject 22 Units into the skin nightly) 5 pen 0    tamsulosin (FLOMAX) 0.4 mg capsule Take 1 capsule by mouth daily (Patient not taking: Reported on 2022) 30 capsule 0     No current facility-administered medications on file prior to visit. Social History     Socioeconomic History    Marital status: Single     Spouse name: Not on file    Number of children: Not on file    Years of education: Not on file    Highest education level: Not on file   Occupational History    Not on file   Tobacco Use    Smoking status: Never    Smokeless tobacco: Never   Substance and Sexual Activity    Alcohol use: No    Drug use: No    Sexual activity: Not on file   Other Topics Concern    Not on file   Social History Narrative    Not on file     Social Determinants of Health     Financial Resource Strain: Not on file   Food Insecurity: Not on file   Transportation Needs: Not on file   Physical Activity: Inactive    Days of Exercise per Week: 0 days    Minutes of Exercise per Session: 0 min   Stress: Not on file   Social Connections: Not on file   Intimate Partner Violence: Not At Risk    Fear of Current or Ex-Partner: No    Emotionally Abused: No    Physically Abused: No    Sexually Abused: No   Housing Stability: Not on file       Family History   Problem Relation Age of Onset    Diabetes Father     Breast Cancer Paternal Grandmother     Cancer Paternal Grandmother     Diabetes Paternal Grandfather     Colon Cancer Neg Hx     Ovarian Cancer Neg Hx          ALLERGIES  Allergies   Allergen Reactions    Sulfa Antibiotics Rash       PREVIOUS PRIMARY CARE PROVIDER  Haley Alcocer MD      RECORDS    Provided by patient: ***     Review of Systems    Physical Exam    Vitals:    07/18/22 1423   BP: 118/72   Pulse: 68   Resp: 12   Temp: 98 °F (36.7 °C)   SpO2: 98%        On this date, I spent *** minutes reviewing previous notes, test results and face to face with the patient discussing the diagnosis and importance of compliance with the treatment plan as well as documenting on the day of the visit. No LOS data to display        An electronic signature was used to authenticate this note.

## 2022-07-22 ENCOUNTER — TELEPHONE (OUTPATIENT)
Dept: FAMILY MEDICINE CLINIC | Facility: CLINIC | Age: 32
End: 2022-07-22

## 2022-07-22 DIAGNOSIS — E13.69 OTHER SPECIFIED DIABETES MELLITUS WITH OTHER SPECIFIED COMPLICATION (HCC): ICD-10-CM

## 2022-07-22 NOTE — TELEPHONE ENCOUNTER
Patient called stating the pharmacy told her that we should call her insurance and ask them to approve her enough lancets to test 4 x day. She is using [facility] on [address] for these.

## 2022-07-25 RX ORDER — LANCETS
EACH MISCELLANEOUS
Qty: 120 EACH | Refills: 5 | Status: SHIPPED | OUTPATIENT
Start: 2022-07-25 | End: 2022-11-03 | Stop reason: SDUPTHER

## 2022-07-25 NOTE — TELEPHONE ENCOUNTER
Spoke with pharmacist at [facility]. She stated that when patient runs out of lancets, she should come get  more that Medicare will only pay for 100 at a time. She has 5 refills. Patient is aware of correction.

## 2022-08-01 ENCOUNTER — OFFICE VISIT (OUTPATIENT)
Dept: ENDOCRINOLOGY | Age: 32
End: 2022-08-01
Payer: COMMERCIAL

## 2022-08-01 ENCOUNTER — TELEPHONE (OUTPATIENT)
Dept: ENDOCRINOLOGY | Age: 32
End: 2022-08-01

## 2022-08-01 VITALS
SYSTOLIC BLOOD PRESSURE: 122 MMHG | HEART RATE: 63 BPM | BODY MASS INDEX: 34.07 KG/M2 | HEIGHT: 66 IN | OXYGEN SATURATION: 96 % | WEIGHT: 212 LBS | DIASTOLIC BLOOD PRESSURE: 84 MMHG

## 2022-08-01 DIAGNOSIS — E11.65 UNCONTROLLED TYPE 2 DIABETES MELLITUS WITH HYPERGLYCEMIA (HCC): Primary | ICD-10-CM

## 2022-08-01 DIAGNOSIS — E11.65 UNCONTROLLED TYPE 2 DIABETES MELLITUS WITH HYPERGLYCEMIA (HCC): ICD-10-CM

## 2022-08-01 DIAGNOSIS — E28.2 PCOS (POLYCYSTIC OVARIAN SYNDROME): ICD-10-CM

## 2022-08-01 PROCEDURE — 99215 OFFICE O/P EST HI 40 MIN: CPT | Performed by: PHYSICIAN ASSISTANT

## 2022-08-01 PROCEDURE — 3044F HG A1C LEVEL LT 7.0%: CPT | Performed by: PHYSICIAN ASSISTANT

## 2022-08-01 RX ORDER — INSULIN GLARGINE 100 [IU]/ML
25 INJECTION, SOLUTION SUBCUTANEOUS NIGHTLY
Qty: 9 ML | Refills: 11 | Status: SHIPPED | OUTPATIENT
Start: 2022-08-01 | End: 2022-08-01 | Stop reason: CLARIF

## 2022-08-01 RX ORDER — INSULIN GLARGINE-YFGN 100 [IU]/ML
25 INJECTION, SOLUTION SUBCUTANEOUS DAILY
Qty: 9 ML | Refills: 11 | Status: SHIPPED | OUTPATIENT
Start: 2022-08-01 | End: 2022-10-28 | Stop reason: SDUPTHER

## 2022-08-01 RX ORDER — INSULIN LISPRO 100 U/ML
INJECTION, SOLUTION SUBCUTANEOUS
Qty: 15 ML | Refills: 11 | Status: SHIPPED | OUTPATIENT
Start: 2022-08-01 | End: 2022-08-01 | Stop reason: SDUPTHER

## 2022-08-01 RX ORDER — INSULIN LISPRO 100 U/ML
INJECTION, SOLUTION SUBCUTANEOUS
Qty: 15 ML | Refills: 11 | Status: SHIPPED | OUTPATIENT
Start: 2022-08-01 | End: 2022-10-28 | Stop reason: SDUPTHER

## 2022-08-01 RX ORDER — METFORMIN HYDROCHLORIDE 500 MG/1
500 TABLET, EXTENDED RELEASE ORAL
Qty: 30 TABLET | Refills: 5
Start: 2022-08-01 | End: 2022-11-03 | Stop reason: SDUPTHER

## 2022-08-01 RX ORDER — INSULIN GLARGINE-YFGN 100 [IU]/ML
25 INJECTION, SOLUTION SUBCUTANEOUS DAILY
Qty: 9 ML | Refills: 11 | Status: SHIPPED | OUTPATIENT
Start: 2022-08-01 | End: 2022-08-01 | Stop reason: SDUPTHER

## 2022-08-01 NOTE — PATIENT INSTRUCTIONS
INSULIN ADMINISTRATION INSTRUCTIONS:    Patient Name:  Desire Henry   YOB: 1990    Provider Name:  Minh Miller  Today's Date:  08/01/22      LONG-ACTING INSULIN     Lantus    25 units daily    The purpose of long-acting insulin is to try to achieve fasting (first thing in the morning) blood glucose in the morning . If fasting blood glucose is persistently above 125, increase the long-acting insulin dose by 2 units every 3 days until fasting blood glucose is persistently . If fasting blood glucose is persistently less than 80, decrease the long-acting insulin dose by 2 units every 3 days until fasting blood glucose is persistently . Leave the dose at whichever dose it takes to keep fasting blood glucose . SHORT-ACTING INSULIN     Admelog        Be sure to check blood glucose before meals and at bedtime. Based on the blood glucose reading, take the following amount of insulin:    Blood glucose 150 or less  No insulin  Blood glucose 151-175  1 units  Blood glucose 176-200  2 units  Blood glucose 201-225  3 units  Blood glucose 226-250  4 units  Blood glucose 251-275  5 units  Blood glucose  276-300  6 units  Blood glucose 301-325  7 units  Blood glucose  326-350  8 units  Blood glucose greater than 350 10 units        If there are questions, send an electronic Zulama message to for nonurgent questions or call the office at [phone].     [facility] ENDOCRINOLOGY  [address] [id number]

## 2022-08-01 NOTE — PROGRESS NOTES
[facility] ENDOCRINOLOGY   AND   THYROID NODULE CLINIC    Roseanne Gaytan PA-C  [facility] Endocrinology [facility]odule [facility]  [address], [address]  [place], [place]  Phone [phone]  Facsimile [phone]          Annmarie Sanchez is a 28 y.o. female seen 8/1/2022 at the request of Dr. Ivana Cristobal for the evaluation of type 2 diabetes and PCOS        Assessment and Plan:    In office COVID-19 PPE worn and precautions taken        1. Uncontrolled type 2 diabetes mellitus with hyperglycemia ([facility] [address]  Patient with newly diagnosed type 2 diabetes on basal insulin alone interested in pregnancy. Reports poor tolerance of metformin in past.  Willing to do low-dose metformin challenge with extended release once daily 500 mg metformin    Continue lantus 25units    Start admelog by correction at the 4 befores, will reduce target to 125 in future. Urged to do paired glucose testing. Will plan for basal bolus insulin regimen when pregnant. Continue to monitor, may need single dose prandial insulin    Not open to discussing COVID vaccination, made aware of Novovax option now available       - External Referral to Optometry  - Comprehensive Metabolic Panel; Future  - CBC with Auto Differential; Future  - Microalbumin / Creatinine Urine Ratio; Future  - Lipid Panel; Future  - TSH with Reflex; Future  - LANTUS SOLOSTAR 100 UNIT/ML injection pen; Inject 25 Units into the skin nightly  Dispense: 9 mL; Refill: 11  - ADMELOG SOLOSTAR 100 UNIT/ML SOPN; By correction 1 per 25 over 150 AC/HS, max daily dose 30 units  Dispense: 15 mL; Refill: 11  - metFORMIN (GLUCOPHAGE-XR) 500 MG extended release tablet; Take 1 tablet by mouth daily (with breakfast)  Dispense: 30 tablet; Refill: 5    2. PCOS (polycystic ovarian syndrome)  Screen for metabolic disease  - Lipid Panel; Future        Earl Deer was seen today for diabetes.     Diagnoses and all orders for this visit:    Uncontrolled type 2 diabetes mellitus with hyperglycemia ([facility] [address]  -     External Referral to Optometry  -     Comprehensive Metabolic Panel; Future  -     CBC with Auto Differential; Future  -     Microalbumin / Creatinine Urine Ratio; Future  -     Lipid Panel; Future  -     TSH with Reflex; Future  -     LANTUS SOLOSTAR 100 UNIT/ML injection pen; Inject 25 Units into the skin nightly  -     ADMELOG SOLOSTAR 100 UNIT/ML SOPN; By correction 1 per 25 over 150 AC/HS, max daily dose 30 units  -     metFORMIN (GLUCOPHAGE-XR) 500 MG extended release tablet; Take 1 tablet by mouth daily (with breakfast)    PCOS (polycystic ovarian syndrome)  -     Lipid Panel; Future    Other orders  -     Discontinue: ADMELOG SOLOSTAR 100 UNIT/ML SOPN; By correction 1 per 25 over 150, max daily dose 30 units          History of Present Illness:    8/1/2022         DIABETES MELLITUS  Carla Justin is here for new evaluation and treatment of improving type 2 diabetes mellitus.       Review of Records: pt with difficultly getting pregnant, HX of gestational diabetes, checked by MFM and found to have type 2 diabetes      Date of diagnosis: April 2022    Failed metformin XR with nausea     Interested in pregnancy     Denies HX pancreatitis, DKA, gastroparesis, foot ulcer      History obtained from patient       Current Regimen lantus 25 units      Home blood glucose monitoring frequency: 2.8 times per day      By review of meter download over past 30 days  Average blood glucose 146 ± 29  Range      Typical Standard Deviation   Fasting 135 10   AC lunch 158 34   AC supper 142 34   Bedtime 151 24       Blood glucose levels are uncontrolled, most significant elevations are noted post prandial for pregnancy targets      Diet: varied diet, cut out high calorie beverages, picky eater, no ETOH    Exercise:  walking, active with children    Notes increase in blood glucose with carbs    Body weight trend: decreasing steadily, with intention       Wt Readings from Last 3 Encounters: 08/01/22 212 lb (96.2 kg)   07/18/22 208 lb (94.3 kg)   07/14/22 211 lb (95.7 kg)       Pregnancy: interested in pregnancy     Diabetes education: The patient has not received formal diabetes education. Diabetic complications:       Retinopathy: Last eye exam was never and demonstrated ?? diabetic retinopathy. Eye care specialist is NONE. Albuminuria/nephropathy:         05/03/2022 Cr 0.93, GFR >60     Neuropathy:  no reported symptoms      Hypoglycemia: rare, Symptoms include hands shaking    Hyperglycemia: without symptoms     Hemoglobin A1c:    No results found for: [id number], HBA1C        Other pertinent labs:     Lipids:      No results found for: CHOL, TRIGL, HDL, LDLC, VLDL       Thyroid:       04/13/2022 1.310    Allergies & Medications:  Reviewed in chart. Review of Systems    Vital Signs:  /84   Pulse 63   Ht 5' 6\" (1.676 m)   Wt 212 lb (96.2 kg)   SpO2 96%   BMI 34.22 kg/m²     Physical Exam  Constitutional:       Appearance: Normal appearance. HENT:      Head: Normocephalic. Neck:      Thyroid: No thyroid mass or thyromegaly. Vascular: No carotid bruit. Cardiovascular:      Rate and Rhythm: Normal rate and regular rhythm. Pulmonary:      Effort: Pulmonary effort is normal.      Breath sounds: Normal breath sounds. Abdominal:      Palpations: Abdomen is soft. Musculoskeletal:      Cervical back: Neck supple. Right lower leg: No edema. Left lower leg: No edema. Feet:      Right foot:      Protective Sensation: 3 sites tested. 3 sites sensed. Skin integrity: Skin integrity normal.      Left foot:      Protective Sensation: 3 sites tested. 3 sites sensed. Skin integrity: Skin integrity normal.   Lymphadenopathy:      Cervical: No cervical adenopathy. Skin:     General: Skin is warm and dry. Neurological:      General: No focal deficit present. Mental Status: She is alert. Sensory: Sensation is intact.    Psychiatric: Mood and Affect: Mood normal.         Behavior: Behavior normal.         Thought Content: Thought content normal.         Judgment: Judgment normal.          Return 3--4 mo, for Diabetes DM2 Follow-Up. Portions of this note were generated with the assistance of voice recogniton software. As such, some errors in transcription may be present.

## 2022-08-17 ENCOUNTER — OFFICE VISIT (OUTPATIENT)
Dept: OBGYN CLINIC | Age: 32
End: 2022-08-17
Payer: COMMERCIAL

## 2022-08-17 VITALS
WEIGHT: 213 LBS | SYSTOLIC BLOOD PRESSURE: 123 MMHG | HEIGHT: 66 IN | BODY MASS INDEX: 34.23 KG/M2 | DIASTOLIC BLOOD PRESSURE: 80 MMHG

## 2022-08-17 DIAGNOSIS — E11.9 TYPE 2 DIABETES MELLITUS WITHOUT COMPLICATION, WITH LONG-TERM CURRENT USE OF INSULIN (HCC): ICD-10-CM

## 2022-08-17 DIAGNOSIS — E28.2 PCOS (POLYCYSTIC OVARIAN SYNDROME): ICD-10-CM

## 2022-08-17 DIAGNOSIS — Z79.4 TYPE 2 DIABETES MELLITUS WITHOUT COMPLICATION, WITH LONG-TERM CURRENT USE OF INSULIN (HCC): ICD-10-CM

## 2022-08-17 DIAGNOSIS — N97.0 INFERTILITY ASSOCIATED WITH ANOVULATION: Primary | ICD-10-CM

## 2022-08-17 DIAGNOSIS — N92.6 IRREGULAR PERIODS: ICD-10-CM

## 2022-08-17 LAB — PROGEST SERPL-MCNC: 1.55 NG/ML

## 2022-08-17 PROCEDURE — 99215 OFFICE O/P EST HI 40 MIN: CPT | Performed by: OBSTETRICS & GYNECOLOGY

## 2022-08-17 PROCEDURE — 81025 URINE PREGNANCY TEST: CPT | Performed by: OBSTETRICS & GYNECOLOGY

## 2022-08-17 PROCEDURE — 3044F HG A1C LEVEL LT 7.0%: CPT | Performed by: OBSTETRICS & GYNECOLOGY

## 2022-08-17 RX ORDER — PROGESTERONE 200 MG/1
CAPSULE ORAL
Qty: 20 CAPSULE | Refills: 1 | Status: SHIPPED | OUTPATIENT
Start: 2022-08-17 | End: 2022-11-03

## 2022-08-17 NOTE — PROGRESS NOTES
CC:  Fu fertility discussions      HPI:    Claudia Ruiz  is a 28 y.o. , [id number], Patient's last menstrual period was 2022 (exact date). ,  who is seen for re-discussion of fertility w/ known PCOS and new T2DM diagnosis. See note from 22. CD#21 yesterday. PCP:  Dr Jami Guillory   Also seeing Endocrine      Contraception:    Previously had been trying to conceive for > 2yrs when seen initially for this complaint. After uncontrolled T2DM was discovered during her w/u she was instructed on strict condom use until better control  Stopped using condoms about 2wks postop          OB hx:  G1  2009 at 41w0d (7#)  G2 primary LTCS at 47UNS on  complicated by Y8FO (insulin), Poly (7#15)    No issues conceiving 1st 2 pregnancies (prior to T2DM diagnosis)  Single  Current partner is FOB of G2          Hx IMB, PCOS:   Pt is now s/p Hysteroscopy, dilatation & curettage w/ me on 22 secondary to IMB, endometrial polyps. Since her surgery reports the following:    Has only had 1 period since her surgery -- states \"barely had anything\"   Only had to use a pantyliner          Last imaging:  TVUS 22:  Uterus 118mL  EMS 15mm  MILDLY ENLARGED ANTEVERTED UTERUS  ENDOMETRIUM - 15. 06MM,ECHOGENIC AREA SEEN WITHIN  MEASURING 1.0 X 0.8 X 0.7CM POSSIBLE POLYP?   RT OV- SEEN TA, APPEARS POLYCYSTIC, DIFFICULT TO EVALUATE  LT OV- SEEN TA, APPEARS WNL, DIFFICULT TO EVALUATE  NO FREE FLUID          T2DM, BMI 35:   now seeing Endocrine who is managing      Currently on insulin and Metformin--see meds    Hemoglobin A1c significantly elevated at 8.5 (22)   Last hgb A1C 6.4 (22)    S/p MFM preconception counseling  Has been counseled on OB ideal parameters   Repeat hgb A1C today __          Smoker: no  Hx STDs: Trich  PCOS sxs:  IMB; denies any male pattern hair growth, etc  Endometriosis sxs: none  Prior surgeries on cervix, tubes, uterus, abd: c/s and recent hysteroscopy D&C   Prior infertility W/U:  None     Timed intercourse: yes  OPKs: yes         Labs:  Rh pos:  Yes       CD# 21 Progesterone (4/21/22) very low--consistent with no ovulation this month       TFTs, prolactin wnl  Total and free testosterone elevated--consistent with PCOS  AMH elevated at 6.74--often seen with PCOS  Insulin elevated          Male partner:  Age: 32  Smoker: no  Profession: sales     Previous semen analysis: No  Previous kids from other relationships: yes, G2    Hx Mumps:  no  Testicular trauma/surgeries:  no        GYN HISTORY:  As per HPI    Hx STDs:  Trich     Last Pap:  9/20/2016-- neg cytology, +Trich  Hx Abnl Paps: Only as above     Current Outpatient Medications on File Prior to Visit   Medication Sig Dispense Refill    Prenatal Vit-Fe Fumarate-FA (PRENATAL VITAMIN PO) Take by mouth      metFORMIN (GLUCOPHAGE-XR) 500 MG extended release tablet Take 1 tablet by mouth daily (with breakfast) 30 tablet 5    Insulin Glargine-yfgn 100 UNIT/ML SOPN Inject 25 Units into the skin daily 9 mL 11    ADMELOG SOLOSTAR 100 UNIT/ML SOPN By correction 1 per 25 over 150 AC/HS, max daily dose 30 units 15 mL 11    Accu-Chek Softclix Lancets MISC Use 4x daily to test BG. 120 each 5    Insulin Pen Needle (PEN NEEDLES) 30G X 5 MM MISC 1 pen by Does not apply route nightly Pt to use with Lantus nightly. DX: E11.9 100 each 1    ACCU-CHEK GUIDE strip CHECK BLOOD SUGAR FASTING BEFORE BREAKFAST THEN 1 HOUR AFTER THE FIRST BITE OF EACH MEAL (BREAKFAST, LUNCH, DINNER) 100 strip 1     No current facility-administered medications on file prior to visit.          Past Medical History:   Diagnosis Date    Anemia 2009    in pregnancy    History of gestational diabetes 2016    Infertility associated with anovulation     Irregular menstrual bleeding     Nephrolithiasis     PCOS (polycystic ovarian syndrome)     Poor dentition     Type 2 diabetes mellitus (HCC)     on oral and insulin, avg fbs 145, A1C 6.4 on 6/22/22         Past Surgical History: Procedure Laterality Date    CERVICAL POLYP REMOVAL  2022     SECTION      DILATION AND CURETTAGE OF UTERUS N/A 2022    DILATATION AND CURETTAGE HYSTEROSCOPY performed by Lauren Funez MD at [address]    LITHOTRIPSY  05/10/2022    LITHOTRIPSY Left     ORTHOPEDIC SURGERY Left     Knee         Outpatient Encounter Medications as of 2022   Medication Sig Dispense Refill    letrozole (FEMARA) 2.5 MG tablet Take 2 tablets by mouth daily for 5 days Take Femara 5mg x 5 days total on Cycle Days # 3-7   Take pregnancy test before starting this medication 10 tablet 0    progesterone (PROMETRIUM) 200 MG CAPS capsule Take 1 tablet by mouth for 10 days every months if no period on own. 20 capsule 1    Prenatal Vit-Fe Fumarate-FA (PRENATAL VITAMIN PO) Take by mouth      metFORMIN (GLUCOPHAGE-XR) 500 MG extended release tablet Take 1 tablet by mouth daily (with breakfast) 30 tablet 5    Insulin Glargine-yfgn 100 UNIT/ML SOPN Inject 25 Units into the skin daily 9 mL 11    ADMELOG SOLOSTAR 100 UNIT/ML SOPN By correction 1 per 25 over 150 AC/HS, max daily dose 30 units 15 mL 11    Accu-Chek Softclix Lancets MISC Use 4x daily to test BG. 120 each 5    Insulin Pen Needle (PEN NEEDLES) 30G X 5 MM MISC 1 pen by Does not apply route nightly Pt to use with Lantus nightly. DX: E11.9 100 each 1    ACCU-CHEK GUIDE strip CHECK BLOOD SUGAR FASTING BEFORE BREAKFAST THEN 1 HOUR AFTER THE FIRST BITE OF EACH MEAL (BREAKFAST, LUNCH, DINNER) 100 strip 1     No facility-administered encounter medications on file as of 2022.          Allergies   Allergen Reactions    Sulfa Antibiotics Rash         Family History   Problem Relation Age of Onset    Diabetes Father     No Known Problems Sister     Breast Cancer Paternal Grandmother     Cancer Paternal Grandmother     Diabetes Paternal Grandfather     Kidney Disease Paternal Grandfather     Colon Cancer Neg Hx     Ovarian Cancer Neg Hx          Social History Socioeconomic History    Marital status: Single   Tobacco Use    Smoking status: Never    Smokeless tobacco: Never   Substance and Sexual Activity    Alcohol use: No    Drug use: No     Social Determinants of Health     Physical Activity: Inactive    Days of Exercise per Week: 0 days    Minutes of Exercise per Session: 0 min   Intimate Partner Violence: Not At Risk    Fear of Current or Ex-Partner: No    Emotionally Abused: No    Physically Abused: No    Sexually Abused: No           ROS:  Review of Systems   Constitutional: Negative for chills, fever and weight loss. HENT: Negative for hearing loss. Eyes: Negative for blurred vision and double vision. Respiratory: Negative for cough, hemoptysis and shortness of breath. Cardiovascular: Negative for chest pain, palpitations and orthopnea. Gastrointestinal: Negative for abdominal pain, blood in stool, constipation, diarrhea, nausea and vomiting. Genitourinary: Negative for dysuria, frequency, hematuria and urgency. Musculoskeletal: Negative for falls, joint pain and myalgias. Skin: Negative for itching and rash. Neurological: Negative for headaches. Endo/Heme/Allergies: Does not bruise/bleed easily. Psychiatric/Behavioral: Negative for depression and suicidal ideas. The patient is not nervous/anxious. All other systems reviewed and are negative. PHYSICAL EXAM:  /80   Ht 5' 6\" (1.676 m)   Wt 213 lb (96.6 kg)   LMP 07/27/2022 (Exact Date)   BMI 34.38 kg/m²       Constitutional: She appears well-developed and well-nourished. No distress. HENT:    Head: Normocephalic and atraumatic. Cardiovascular: Regular pulse   Pulmonary/Chest: Effort normal  Skin: She is not diaphoretic. Psychiatric: She has a normal mood and affect. Her behavior is normal. Thought content normal. .           Fertility Counseling:  Pt counseled on all types of factors that can contribute to infertility.   We spoke specifically about possible male factor (sperm count/motility/morphology), cervical factor (stenosis), tubal factor, ovulatory dysfunction factor, and endometriosis. Cycle fecundability, the probability that a single cycle will result in pregnancy, is 20% for a normal fertile couple. Cumulative pregnancy rates in fertile couples are 57% at 3months, 72% at 6months, 85% at 1yr, and 93% at 2yrs. The highest probability of conception is when intercourse occurs 1-2 days before ovulation. PCOS affects 5-10% of reproductive women and is one of the leading causes of infertilty. The PCOS dx needs 2/3 of the following [place] Criteria:  1) irregular menses (anovulation or oligomenorrhea)  2) clinical and/or biochemical signs of hyperandrogenism (hirsuitism, acne, or elevated serum androgens) and exclusion of other causes, 3) polycystic ovaries (>/=87 follicles in each ovary measuring 2-9mm and/or increased ovarian volume >10ml on TVUS). PCOS can cause infertility secondary to anovulation. Often patients are obese, but 20% are thin. Metformin helps to decrease androgens and hyperglycemia and can increase rates of spontaneous ovulation. It is a category B drug--safe in pregnancy. SE's most often are GI intolerance and diarrhea. Also discussed lifestyle modifications/weight loss--even modest weight loss (5-10% of blody weight) can result in restoration of normal ovulatory cycles. Discussed it appears her infertility is secondary to anovulation. Femara is an Aromatase Inhibitor that acts to help cause ovulation. Risks:  5-8% chance of multiples, very small risk of ovarian hyperstimulation (abd pain, nausea/vomiting, diarrhea, enlarged ovaries, ascites, respiratory distress/hydrothorax, third spacing, coagulation abnormalities). Will start with Femara 5mg x 5 days (CD 3-7) and titrate up if needed after checking day #21 Progesterone levels to evaluate whether or not the pt has ovulated.              Patient counseled face to face for more than 50% of the total time spent with the patient. On this date I have spent 57 minutes reviewing previous notes, test results, and face to face with the patient discussing the diagnosis and importance of compliance with the treatment plan as well as documenting. ASSESSMENT/PLAN:   28 y.o., [id number] with Infertility, PCOS w/ IMB, T2DM:     1) above:  -upt today neg   -much improved hgb A1C  -rx Femara 5mg x 5 days (CD 3-7)    -check monthly CD#21 Progesterones  -Rx Prometrium 200mg to induce w/drawal bleed   -FU to reassess if not pregnant in 3 months  -continue w/ glycemic improvement   -PNV      2) routine:  -Needs updated for AE ASAP w/ repeat PAP                     Diagnosis Orders   1. Infertility associated with anovulation  Progesterone    AMB POC URINE PREGNANCY TEST, VISUAL COLOR COMPARISON    Progesterone      2. PCOS (polycystic ovarian syndrome)        3. Type 2 diabetes mellitus without complication, with long-term current use of insulin (HCC)  Hemoglobin A1C    Hemoglobin A1C      4.  Irregular periods              Orders Placed This Encounter   Procedures    Hemoglobin A1C     Standing Status:   Future     Number of Occurrences:   1     Standing Expiration Date:   8/17/2023    Progesterone     Standing Status:   Future     Number of Occurrences:   1     Standing Expiration Date:   8/17/2023    AMB POC URINE PREGNANCY TEST, VISUAL COLOR COMPARISON              Sandeep Alaniz MD 101

## 2022-08-18 LAB
EST. AVERAGE GLUCOSE BLD GHB EST-MCNC: 134 MG/DL
HBA1C MFR BLD: 6.3 % (ref 4.8–5.6)

## 2022-08-18 RX ORDER — LETROZOLE 2.5 MG/1
TABLET, FILM COATED ORAL
Qty: 10 TABLET | Refills: 2 | Status: SHIPPED | OUTPATIENT
Start: 2022-08-18 | End: 2022-11-03

## 2022-08-18 NOTE — RESULT ENCOUNTER NOTE
CD#21 progesterone c/w NO ovulation this month    Femara was discussed in office    Give Rx for Femara 5mg x 5 days (CD 3-7) and we will titrate up if needed after checking day #21 Progesterone levels every month to evaluate whether or not the pt has ovulated.         Hgb a1c pending

## 2022-08-20 RX ORDER — LETROZOLE 2.5 MG/1
5 TABLET, FILM COATED ORAL DAILY
Qty: 10 TABLET | Refills: 0 | Status: SHIPPED | OUTPATIENT
Start: 2022-08-20 | End: 2022-11-03

## 2022-08-22 DIAGNOSIS — E13.69 OTHER SPECIFIED DIABETES MELLITUS WITH OTHER SPECIFIED COMPLICATION (HCC): ICD-10-CM

## 2022-08-22 RX ORDER — BLOOD SUGAR DIAGNOSTIC
STRIP MISCELLANEOUS
Qty: 100 STRIP | Refills: 1 | Status: SHIPPED | OUTPATIENT
Start: 2022-08-22

## 2022-08-22 RX ORDER — BLOOD SUGAR DIAGNOSTIC
STRIP MISCELLANEOUS
Qty: 100 STRIP | Refills: 1 | Status: SHIPPED | OUTPATIENT
Start: 2022-08-22 | End: 2022-08-22 | Stop reason: SDUPTHER

## 2022-09-09 ENCOUNTER — NURSE ONLY (OUTPATIENT)
Dept: OBGYN CLINIC | Age: 32
End: 2022-09-09

## 2022-09-09 DIAGNOSIS — N97.0 INFERTILITY ASSOCIATED WITH ANOVULATION: ICD-10-CM

## 2022-09-09 DIAGNOSIS — N97.0 INFERTILITY ASSOCIATED WITH ANOVULATION: Primary | ICD-10-CM

## 2022-09-09 LAB — PROGEST SERPL-MCNC: 12.32 NG/ML

## 2022-09-11 NOTE — RESULT ENCOUNTER NOTE
+ovulation this month on Femara 5mg x 5 days (CD 3-7) . Check pregnancy test if no period this month. If does not conceive this month repeat this same dose next month. Please send Rx. Take UPT prior to taking next dose.

## 2022-10-10 ENCOUNTER — NURSE ONLY (OUTPATIENT)
Dept: OBGYN CLINIC | Age: 32
End: 2022-10-10

## 2022-10-10 DIAGNOSIS — E28.2 PCOS (POLYCYSTIC OVARIAN SYNDROME): Primary | ICD-10-CM

## 2022-10-10 LAB — PROGEST SERPL-MCNC: 12.79 NG/ML

## 2022-10-19 ENCOUNTER — PATIENT MESSAGE (OUTPATIENT)
Dept: ENDOCRINOLOGY | Age: 32
End: 2022-10-19

## 2022-10-19 DIAGNOSIS — E11.9 TYPE 2 DIABETES MELLITUS WITHOUT COMPLICATION, WITH LONG-TERM CURRENT USE OF INSULIN (HCC): ICD-10-CM

## 2022-10-19 DIAGNOSIS — E11.65 UNCONTROLLED TYPE 2 DIABETES MELLITUS WITH HYPERGLYCEMIA (HCC): ICD-10-CM

## 2022-10-19 DIAGNOSIS — Z79.4 TYPE 2 DIABETES MELLITUS WITHOUT COMPLICATION, WITH LONG-TERM CURRENT USE OF INSULIN (HCC): ICD-10-CM

## 2022-10-19 NOTE — TELEPHONE ENCOUNTER
From: Merlinda Boll  To: Kike Cox  Sent: 10/19/2022 9:08 AM EDT  Subject: Pregnant     Hey   I had positive home pregnancy test and I have my first appointment with my obgyn to find out how long I am on Nov 22.

## 2022-10-26 NOTE — TELEPHONE ENCOUNTER
Gilbert response:    Hi,  Congratulations! !    I was out of town and see where the doc on call sent you some information about insulin. Let me know if you have any questions. Please keep a log of your sugars and the amount of insulin you are using. I'm happy to print out some blank copies for you to either  or I can email them to you to print or use as a template for your home log. As the Doc mentioned, our goal is to ensure you wake up with sugars 60-90, start a meal less than 120 and keep your sugar from spiking much over 110-120 at any given time. Drinking water, taking your prenatal vitamins with folic acid, and walking daily are all recommended as well. I will ask the  to try to get you in for an appointment soon.      ~Aydee

## 2022-10-28 RX ORDER — PEN NEEDLE, DIABETIC 30 GX3/16"
NEEDLE, DISPOSABLE MISCELLANEOUS
Qty: 150 EACH | Refills: 11 | Status: SHIPPED | OUTPATIENT
Start: 2022-10-28

## 2022-10-28 RX ORDER — INSULIN GLARGINE-YFGN 100 [IU]/ML
INJECTION, SOLUTION SUBCUTANEOUS
Qty: 9 ML | Refills: 11
Start: 2022-10-28 | End: 2022-11-03 | Stop reason: SDUPTHER

## 2022-10-28 RX ORDER — INSULIN LISPRO 100 U/ML
INJECTION, SOLUTION SUBCUTANEOUS
Qty: 15 ML | Refills: 11
Start: 2022-10-28 | End: 2022-11-03 | Stop reason: SDUPTHER

## 2022-10-28 NOTE — TELEPHONE ENCOUNTER
Recent positive pregnancy test. Taking prenatal vitamins    YFGN 31 units in pm   Using ademlog by correction 1      Home blood glucose monitoring frequency: 4 times per day    By recall     Typical Standard Deviation   Fasting 130-140 As low as 113, sleeping late   TRISTAR [facility] lunch Double digits As low as 80s   AC supper Double digits-low 100 As low as 70   Bedtime 120-130      Change to:  YFGN 15 in am and 18 in PM    Blood glucose 100 or less                  No insulin  Blood glucose 101-125                       1 units  Blood glucose 126-150                       2 units  Blood glucose 151-175                       3 units  Blood glucose 176-200                       4 units  Blood glucose greater than 200         5 units

## 2022-11-01 ENCOUNTER — OFFICE VISIT (OUTPATIENT)
Dept: FAMILY MEDICINE CLINIC | Facility: CLINIC | Age: 32
End: 2022-11-01
Payer: COMMERCIAL

## 2022-11-01 VITALS
DIASTOLIC BLOOD PRESSURE: 72 MMHG | WEIGHT: 206 LBS | TEMPERATURE: 98.4 F | HEART RATE: 85 BPM | BODY MASS INDEX: 33.11 KG/M2 | HEIGHT: 66 IN | OXYGEN SATURATION: 98 % | SYSTOLIC BLOOD PRESSURE: 104 MMHG

## 2022-11-01 DIAGNOSIS — E11.9 TYPE 2 DIABETES MELLITUS WITHOUT COMPLICATION, WITH LONG-TERM CURRENT USE OF INSULIN (HCC): ICD-10-CM

## 2022-11-01 DIAGNOSIS — R50.9 FEVER, UNSPECIFIED FEVER CAUSE: ICD-10-CM

## 2022-11-01 DIAGNOSIS — E28.2 PCOS (POLYCYSTIC OVARIAN SYNDROME): ICD-10-CM

## 2022-11-01 DIAGNOSIS — Z86.32 HISTORY OF GESTATIONAL DIABETES: ICD-10-CM

## 2022-11-01 DIAGNOSIS — R05.1 ACUTE COUGH: ICD-10-CM

## 2022-11-01 DIAGNOSIS — N91.2 AMENORRHEA: Primary | ICD-10-CM

## 2022-11-01 DIAGNOSIS — Z79.4 TYPE 2 DIABETES MELLITUS WITHOUT COMPLICATION, WITH LONG-TERM CURRENT USE OF INSULIN (HCC): ICD-10-CM

## 2022-11-01 LAB
INFLUENZA A ANTIGEN, POC: NEGATIVE
INFLUENZA B ANTIGEN, POC: NEGATIVE
VALID INTERNAL CONTROL, POC: YES

## 2022-11-01 PROCEDURE — 99214 OFFICE O/P EST MOD 30 MIN: CPT | Performed by: FAMILY MEDICINE

## 2022-11-01 PROCEDURE — 3044F HG A1C LEVEL LT 7.0%: CPT | Performed by: FAMILY MEDICINE

## 2022-11-01 PROCEDURE — 87804 INFLUENZA ASSAY W/OPTIC: CPT | Performed by: FAMILY MEDICINE

## 2022-11-01 RX ORDER — FAMOTIDINE 20 MG/1
20 TABLET, FILM COATED ORAL 2 TIMES DAILY
Qty: 60 TABLET | Refills: 3 | Status: SHIPPED | OUTPATIENT
Start: 2022-11-01

## 2022-11-01 ASSESSMENT — PATIENT HEALTH QUESTIONNAIRE - PHQ9
SUM OF ALL RESPONSES TO PHQ QUESTIONS 1-9: 0
SUM OF ALL RESPONSES TO PHQ QUESTIONS 1-9: 0
SUM OF ALL RESPONSES TO PHQ9 QUESTIONS 1 & 2: 0
1. LITTLE INTEREST OR PLEASURE IN DOING THINGS: 0
2. FEELING DOWN, DEPRESSED OR HOPELESS: 0
SUM OF ALL RESPONSES TO PHQ QUESTIONS 1-9: 0
SUM OF ALL RESPONSES TO PHQ QUESTIONS 1-9: 0

## 2022-11-01 ASSESSMENT — ENCOUNTER SYMPTOMS
SORE THROAT: 1
SINUS PAIN: 1
COUGH: 1

## 2022-11-01 NOTE — PROGRESS NOTES
Cherry Back (: 1990) is a 28 y.o. female, established patient, here for evaluation of the following chief complaint(s): Other (Cough/pregnancy test/), Diabetes, and Emesis       ASSESSMENT/PLAN:  1. Amenorrhea  -     AMB POC URINE PREGNANCY TEST, VISUAL COLOR COMPARISON  -     AMB POC URINALYSIS DIP STICK AUTO W/O MICRO  2. Fever, unspecified fever cause  -     AMB POC RAPID INFLUENZA TEST  -     Culture, Urine; Future  3. Acute cough  4. PCOS (polycystic ovarian syndrome)  5. History of gestational diabetes  6. Type 2 diabetes mellitus without complication, with long-term current use of insulin (HCC)  Positive pregnancy test at home,   Varsha Colmenares had similar viral symptoms, tested negative for flu  Patient also negative for flu today, unable to urinate for us  Taking cup home to bring back, will do ua and urine pregnancy  Negative for worrisome symptoms currently  Pepcid for prn nausea/reflux associated with pregnancy  Has gyn f/u in a few weeks    SUBJECTIVE/OBJECTIVE:  HPI  2 days of nausea, intermittent vomiting, ?6-8 weeks pregnant, not seen by gyn yet  Unable to urinate for us, \"shy bladder\", positive pregnancy tests x 2 at home  Varsha Colmenares has uri, viral diagnosed at doctor, negative for covid and flu a few days ago. Review of Systems   Constitutional:  Positive for fatigue and fever. HENT:  Positive for congestion, postnasal drip, sinus pain and sore throat. Respiratory:  Positive for cough. Physical Exam  Vitals and nursing note reviewed. Constitutional:       General: She is not in acute distress. Appearance: Normal appearance. She is not ill-appearing. HENT:      Head: Normocephalic and atraumatic. Right Ear: External ear normal.      Left Ear: External ear normal.      Nose: Nose normal.      Mouth/Throat:      Mouth: Mucous membranes are dry. Eyes:      Extraocular Movements: Extraocular movements intact.       Pupils: Pupils are equal, round, and reactive to light. Cardiovascular:      Rate and Rhythm: Normal rate. Pulses: Normal pulses. Pulmonary:      Effort: Pulmonary effort is normal.      Breath sounds: Normal breath sounds. Abdominal:      General: There is no distension. Musculoskeletal:         General: Normal range of motion. Cervical back: Normal range of motion and neck supple. Skin:     General: Skin is warm and dry. Neurological:      General: No focal deficit present. Mental Status: She is alert and oriented to person, place, and time. Psychiatric:         Mood and Affect: Mood normal.         On this date 11/01/22  I have spent 30 minutes reviewing previous notes, test results and face to face with the patient discussing the diagnosis and importance of compliance with the treatment plan as well as documenting on the day of the visit. An electronic signature was used to authenticate this note.   -- Vicky Burgos MD

## 2022-11-03 ENCOUNTER — OFFICE VISIT (OUTPATIENT)
Dept: ENDOCRINOLOGY | Age: 32
End: 2022-11-03
Payer: COMMERCIAL

## 2022-11-03 VITALS
BODY MASS INDEX: 33.64 KG/M2 | OXYGEN SATURATION: 96 % | DIASTOLIC BLOOD PRESSURE: 76 MMHG | SYSTOLIC BLOOD PRESSURE: 130 MMHG | HEART RATE: 80 BPM | WEIGHT: 208.4 LBS

## 2022-11-03 DIAGNOSIS — E11.65 UNCONTROLLED TYPE 2 DIABETES MELLITUS WITH HYPERGLYCEMIA (HCC): ICD-10-CM

## 2022-11-03 DIAGNOSIS — O24.111 TYPE 2 DIABETES MELLITUS IN PREGNANCY, FIRST TRIMESTER: ICD-10-CM

## 2022-11-03 DIAGNOSIS — E11.65 UNCONTROLLED TYPE 2 DIABETES MELLITUS WITH HYPERGLYCEMIA (HCC): Primary | ICD-10-CM

## 2022-11-03 LAB
BACTERIA SPEC CULT: NORMAL
BACTERIA SPEC CULT: NORMAL
BASOPHILS # BLD: 0.1 K/UL (ref 0–0.2)
BASOPHILS NFR BLD: 1 % (ref 0–2)
DIFFERENTIAL METHOD BLD: ABNORMAL
EOSINOPHIL # BLD: 0 K/UL (ref 0–0.8)
EOSINOPHIL NFR BLD: 0 % (ref 0.5–7.8)
ERYTHROCYTE [DISTWIDTH] IN BLOOD BY AUTOMATED COUNT: 12.4 % (ref 11.9–14.6)
HBA1C MFR BLD: 5.8 %
HCG SERPL-ACNC: ABNORMAL MIU/ML (ref 0–6)
HCT VFR BLD AUTO: 43.4 % (ref 35.8–46.3)
HGB BLD-MCNC: 13.8 G/DL (ref 11.7–15.4)
IMM GRANULOCYTES # BLD AUTO: 0 K/UL (ref 0–0.5)
IMM GRANULOCYTES NFR BLD AUTO: 0 % (ref 0–5)
LYMPHOCYTES # BLD: 1.4 K/UL (ref 0.5–4.6)
LYMPHOCYTES NFR BLD: 20 % (ref 13–44)
MCH RBC QN AUTO: 27.5 PG (ref 26.1–32.9)
MCHC RBC AUTO-ENTMCNC: 31.8 G/DL (ref 31.4–35)
MCV RBC AUTO: 86.5 FL (ref 82–102)
MONOCYTES # BLD: 0.5 K/UL (ref 0.1–1.3)
MONOCYTES NFR BLD: 8 % (ref 4–12)
NEUTS SEG # BLD: 5 K/UL (ref 1.7–8.2)
NEUTS SEG NFR BLD: 71 % (ref 43–78)
NRBC # BLD: 0 K/UL (ref 0–0.2)
PLATELET # BLD AUTO: 305 K/UL (ref 150–450)
PMV BLD AUTO: 11.8 FL (ref 9.4–12.3)
RBC # BLD AUTO: 5.02 M/UL (ref 4.05–5.2)
SERVICE CMNT-IMP: NORMAL
T4 FREE SERPL-MCNC: 1.2 NG/DL (ref 0.78–1.46)
TSH, 3RD GENERATION: 1.44 UIU/ML (ref 0.36–3.74)
WBC # BLD AUTO: 7 K/UL (ref 4.3–11.1)

## 2022-11-03 PROCEDURE — 99214 OFFICE O/P EST MOD 30 MIN: CPT | Performed by: PHYSICIAN ASSISTANT

## 2022-11-03 PROCEDURE — 3044F HG A1C LEVEL LT 7.0%: CPT | Performed by: PHYSICIAN ASSISTANT

## 2022-11-03 PROCEDURE — 83036 HEMOGLOBIN GLYCOSYLATED A1C: CPT | Performed by: PHYSICIAN ASSISTANT

## 2022-11-03 RX ORDER — INSULIN GLARGINE-YFGN 100 [IU]/ML
INJECTION, SOLUTION SUBCUTANEOUS
Qty: 15 ML | Refills: 11 | Status: SHIPPED | OUTPATIENT
Start: 2022-11-03

## 2022-11-03 RX ORDER — LANCETS
EACH MISCELLANEOUS
Qty: 150 EACH | Refills: 5 | Status: SHIPPED | OUTPATIENT
Start: 2022-11-03

## 2022-11-03 RX ORDER — METFORMIN HYDROCHLORIDE 500 MG/1
500 TABLET, EXTENDED RELEASE ORAL
Qty: 60 TABLET | Refills: 5 | Status: SHIPPED | OUTPATIENT
Start: 2022-11-03

## 2022-11-03 RX ORDER — BLOOD-GLUCOSE TRANSMITTER
EACH MISCELLANEOUS
Qty: 1 EACH | Refills: 3 | Status: SHIPPED | OUTPATIENT
Start: 2022-11-03

## 2022-11-03 RX ORDER — INSULIN LISPRO 100 U/ML
INJECTION, SOLUTION SUBCUTANEOUS
Qty: 15 ML | Refills: 11 | Status: SHIPPED | OUTPATIENT
Start: 2022-11-03 | End: 2022-11-25 | Stop reason: SDUPTHER

## 2022-11-03 NOTE — PATIENT INSTRUCTIONS
INSULIN ADMINISTRATION INSTRUCTIONS:    Patient Name:  Rashmi Mercedes   YOB: 1990    Provider Name:  Kareem Dias PA-C  Today's Date:  11/03/22      LONG-ACTING INSULIN     Lantus    17 units in am  20 units in pm        SHORT-ACTING INSULIN     Admelog    2 units before ever meal (breakfast, lunch, supper)    1 unit before a snack    Be sure to check blood glucose before meals and at bedtime. Based on the blood glucose reading, take the following amount of insulin:    Blood glucose 125 or less  No extra insulin  Blood glucose 126-150  1 units  Blood glucose 151-175  2 units  Blood glucose 176-200  3 units  Blood glucose 201-225  4 units  Blood glucose 226-250  5 units  Blood glucose 251-275  6 units  Blood glucose  276-300  7 units  Blood glucose 301-325  8 units  Blood glucose  326-350  9 units  Blood glucose greater than 350 10 units    Continue metformin        If there are questions, send an electronic [employer] message to for nonurgent questions or call the office at [phone].     [facility] ENDOCRINOLOG[facility]  [address] [place] [id number]

## 2022-11-03 NOTE — PROGRESS NOTES
[facility]    Jose R Ledesma PA-C  [address] [facility]  [address], [address]  [place], [place] Emily Eubanks  Phone [phone]  Facsimile [phone]          Nayana Amador is a 28 y.o. female seen 11/3/2022 for follow up evaluation of type 2 diabetes now pregnant        Assessment and Plan:    In office COVID-19 PPE worn and precautions taken    1. Uncontrolled type 2 diabetes mellitus with hyperglycemia [facility])  Patient with newly diagnosed type 2 diabetes returns for early follow-up reporting she has had a positive home pregnancy test.  Patient's last menstrual period was September 20, 2022. Patient remains on basal insulin plus correction. After finding out she was pregnant her correction threshold was lowered from 150 down to 100. Patient is monitoring her blood sugar multiple times a day and using this information to make insulin treatment decisions. We will start prandial insulin today. Start 2 units before meals, 1 unit before snacks. Continue 1 per 25 correction with a threshold now of 125. Patient tolerating once daily extended release metformin and I will prescribe this as twice daily to see if she can tolerate up titration to twice daily metformin, previously failed IR metformin. Diet and exercise discussed at length. Importance of fiber, fat, protein at every meal and snack reviewed. Patient encouraged to continue to monitor blood sugar 4 times a day. Will prescribe Dexcom G6 to this pregnant patient with diabetes on basal bolus insulin regimen. Continue prenatal vitamins. Check labs today. Plan to follow-up with MFM. Refer to pregnancy diabetes education    - AMB POC HEMOGLOBIN A1C  - [facility]  - TSH; Future  - T4, Free; Future  - HCG, Quantitative, Pregnancy; Future  - CBC with Auto Differential; Future  - metFORMIN (GLUCOPHAGE-XR) 500 MG extended release tablet;  Take 1 tablet by mouth 2 times daily (before meals)  Dispense: 60 tablet; Refill: 5  - ADMELOG SOLOSTAR 100 UNIT/ML SOPN; 2 units before meals, 1 unit before snacks plus correction 1 per 25 over 125 AC/HS, max daily dose 30 units  Dispense: 15 mL; Refill: 11  - Continuous Blood Gluc Transmit (DEXCOM G6 TRANSMITTER) MISC; Use to monitor blood glucose, E11.65  Dispense: 1 each; Refill: 3  - Continuous Blood Gluc Transmit (DEXCOM G6 TRANSMITTER) MISC; Use to monitor blood glucose, E11.65  Dispense: 1 each; Refill: 3  - Insulin Glargine-yfgn 100 UNIT/ML SOPN; 17 in am, 20 in pm  Dispense: 15 mL; Refill: 11  - Accu-Chek Softclix Lancets MISC; Use 4x daily to test BG. Dispense: 150 each; Refill: 5    2. Type 2 diabetes mellitus in pregnancy, first trimester  Check hCG quant, continue prenatal vitamins, follow-up with OB, plan to reestablish with MFM  - TSH; Future  - T4, Free; Future  - HCG, Quantitative, Pregnancy; Future  - metFORMIN (GLUCOPHAGE-XR) 500 MG extended release tablet; Take 1 tablet by mouth 2 times daily (before meals)  Dispense: 60 tablet; Refill: 5  - ADMELOG SOLOSTAR 100 UNIT/ML SOPN; 2 units before meals, 1 unit before snacks plus correction 1 per 25 over 125 AC/HS, max daily dose 30 units  Dispense: 15 mL; Refill: 11  - Continuous Blood Gluc Transmit (DEXCOM G6 TRANSMITTER) MISC; Use to monitor blood glucose, E11.65  Dispense: 1 each; Refill: 3  - Continuous Blood Gluc Transmit (DEXCOM G6 TRANSMITTER) MISC; Use to monitor blood glucose, E11.65  Dispense: 1 each; Refill: 3  - Insulin Glargine-yfgn 100 UNIT/ML SOPN; 17 in am, 20 in pm  Dispense: 15 mL; Refill: 11  - Accu-Chek Softclix Lancets MISC; Use 4x daily to test BG. Dispense: 150 each; Refill: 5          Neil Esparza was seen today for follow-up and diabetes. Diagnoses and all orders for this visit:    Uncontrolled type 2 diabetes mellitus with hyperglycemia ([address]  -     AMB POC HEMOGLOBIN A1C  -     [facility]  -     TSH;  Future  -     T4, Free; Future  -     HCG, Quantitative, Pregnancy; Future  -     CBC with Auto Differential; Future  -     metFORMIN (GLUCOPHAGE-XR) 500 MG extended release tablet; Take 1 tablet by mouth 2 times daily (before meals)  -     ADMELOG SOLOSTAR 100 UNIT/ML SOPN; 2 units before meals, 1 unit before snacks plus correction 1 per 25 over 125 AC/HS, max daily dose 30 units  -     Continuous Blood Gluc Transmit (DEXCOM G6 TRANSMITTER) MISC; Use to monitor blood glucose, E11.65  -     Continuous Blood Gluc Transmit (DEXCOM G6 TRANSMITTER) MISC; Use to monitor blood glucose, E11.65  -     Insulin Glargine-yfgn 100 UNIT/ML SOPN; 17 in am, 20 in pm  -     Accu-Chek Softclix Lancets MISC; Use 4x daily to test BG. Type 2 diabetes mellitus in pregnancy, first trimester  -     TSH; Future  -     T4, Free; Future  -     HCG, Quantitative, Pregnancy; Future  -     metFORMIN (GLUCOPHAGE-XR) 500 MG extended release tablet; Take 1 tablet by mouth 2 times daily (before meals)  -     ADMELOG SOLOSTAR 100 UNIT/ML SOPN; 2 units before meals, 1 unit before snacks plus correction 1 per 25 over 125 AC/HS, max daily dose 30 units  -     Continuous Blood Gluc Transmit (DEXCOM G6 TRANSMITTER) MISC; Use to monitor blood glucose, E11.65  -     Continuous Blood Gluc Transmit (DEXCOM G6 TRANSMITTER) MISC; Use to monitor blood glucose, E11.65  -     Insulin Glargine-yfgn 100 UNIT/ML SOPN; 17 in am, 20 in pm  -     Accu-Chek Softclix Lancets MISC; Use 4x daily to test BG. History of Present Illness:        11/3/2022   Interim diabetes HPI:    Patient with PCOS and type 2 diabetes started on metformin at last visit to help in increase fertility found out she is pregnant, last menstrual period was Sept 20. Has scheduled visit with OB, taking prenatal vitamins.  Started on sliding scale insulin    Interim medical history changes:   Now pregnant, started on sliding scale insulin, had adjustment of basal insulin due to fasting highs, now splitting, remains high in am     Lifestyle Update:  Frequent checking and premeal correction    Current Regimen: yfgn 15 in am, 18 in Pm, admelog by correction TIDAC 1/25>100    Glucose data:     Home blood glucose monitoring frequency: 4-7 times per day    By review of meter download over past 30 days  Average blood glucose 125 ± 21  Time in range 98% ()  High 2%, Very High 0%  Low 0%, Very Low 0%     Typical    Fasting Low-mid 100 As low as 120   AC lunch Double digits- low 100 1 hour pp low-mid 100, as high as 147   AC supper Double digits- low 100  1 hour pp low-mid 100, as high as 153   Bedtime       Blood glucose levels are uncontrolled for pregnancy targets, most significant elevations are fasting      Failed past therapies:   IR metformin with malaise     Relevant co morbidities:  PCOS    Denies  HX pancreatitis, DKA, gastroparesis, foot ulcer    Optho:    Last eye exam was Sept 2022 and demonstrated no diabetic retinopathy. Obesity:         Body mass index is 33.64 kg/m². stable      Wt Readings from Last 3 Encounters:   11/03/22 208 lb 6.4 oz (94.5 kg)   11/01/22 206 lb (93.4 kg)   08/17/22 213 lb (96.6 kg)         CardioVascular:    None   Renal:                            05/03/2022      Cr 0.93, GFR >60       Lipids:     Current therapy none    No results found for: CHOL, TRIGL, HDL, LDLC, VLDL    Hemoglobin A1c:    04/13/2022 8.5%    06/22/2022 6.4%.    08/17/2022 6.3%    11/03/2022 5.8%       Thyroid:       04/13/2022    TSH  1.310    Free T4 1.26    Allergies & Medications:  Reviewed in chart. Review of Systems    Vital Signs:  /76   Pulse 80   Wt 208 lb 6.4 oz (94.5 kg)   SpO2 96%   BMI 33.64 kg/m²       Physical Exam  Constitutional:       Appearance: Normal appearance. HENT:      Head: Normocephalic. Neck:      Thyroid: No thyroid mass or thyromegaly. Vascular: No carotid bruit. Cardiovascular:      Rate and Rhythm: Normal rate and regular rhythm.    Pulmonary: Effort: Pulmonary effort is normal.      Breath sounds: Normal breath sounds. Abdominal:      Palpations: Abdomen is soft. Musculoskeletal:      Cervical back: Neck supple. Right lower leg: No edema. Left lower leg: No edema. Feet:      Right foot:      Protective Sensation: 3 sites tested. 3 sites sensed. Skin integrity: Skin integrity normal.      Left foot:      Protective Sensation: 3 sites tested. 3 sites sensed. Skin integrity: Skin integrity normal.   Lymphadenopathy:      Cervical: No cervical adenopathy. Skin:     General: Skin is warm and dry. Neurological:      General: No focal deficit present. Mental Status: She is alert. Sensory: Sensation is intact. Psychiatric:         Mood and Affect: Mood normal.         Behavior: Behavior normal.         Thought Content: Thought content normal.         Judgment: Judgment normal.           Return telephone only Nov, keep Dec appt, for other diabetes f/u. Portions of this note were generated with the assistance of voice recogniton software. As such, some errors in transcription may be present.

## 2022-11-04 ENCOUNTER — FOLLOWUP TELEPHONE ENCOUNTER (OUTPATIENT)
Dept: DIABETES SERVICES | Age: 32
End: 2022-11-04

## 2022-11-07 ENCOUNTER — FOLLOWUP TELEPHONE ENCOUNTER (OUTPATIENT)
Dept: DIABETES SERVICES | Age: 32
End: 2022-11-07

## 2022-11-07 NOTE — TELEPHONE ENCOUNTER
Pt called and cancelled her gestational diabetes education for 11/9/22 stating her son has a doctor's appointment. Pt told [facility] she would call back to r/s.

## 2022-11-09 ENCOUNTER — PATIENT MESSAGE (OUTPATIENT)
Dept: ENDOCRINOLOGY | Age: 32
End: 2022-11-09

## 2022-11-09 ENCOUNTER — FOLLOWUP TELEPHONE ENCOUNTER (OUTPATIENT)
Dept: DIABETES SERVICES | Age: 32
End: 2022-11-09

## 2022-11-09 DIAGNOSIS — E11.65 UNCONTROLLED TYPE 2 DIABETES MELLITUS WITH HYPERGLYCEMIA (HCC): Primary | ICD-10-CM

## 2022-11-09 DIAGNOSIS — O24.111 TYPE 2 DIABETES MELLITUS IN PREGNANCY, FIRST TRIMESTER: ICD-10-CM

## 2022-11-09 NOTE — TELEPHONE ENCOUNTER
Left message asking pt for a call to r/s her in a gestational diabetes class (pt with type 1 diabetes and pregnant).

## 2022-11-10 RX ORDER — BLOOD-GLUCOSE SENSOR
EACH MISCELLANEOUS
Qty: 9 EACH | Refills: 3 | Status: SHIPPED | OUTPATIENT
Start: 2022-11-10

## 2022-11-10 NOTE — TELEPHONE ENCOUNTER
From: Jurgen Durham  To: Dg Ernst  Sent: 11/9/2022 11:44 AM EST  Subject: Anushka Lancaster    I was able to get the Anushka Lancaster my insurance covered it.

## 2022-11-10 NOTE — TELEPHONE ENCOUNTER
Pregnant, please make sure she has what she needs. Maybe a sample transmitter for now? ?    Putting in a CGM start referral in case it is needed and i'm out of office

## 2022-11-14 ENCOUNTER — FOLLOWUP TELEPHONE ENCOUNTER (OUTPATIENT)
Dept: DIABETES SERVICES | Age: 32
End: 2022-11-14

## 2022-11-14 RX ORDER — ONDANSETRON 4 MG/1
4 TABLET, ORALLY DISINTEGRATING ORAL EVERY 8 HOURS PRN
Qty: 30 TABLET | Refills: 1 | Status: SHIPPED | OUTPATIENT
Start: 2022-11-14

## 2022-11-15 ENCOUNTER — TELEPHONE (OUTPATIENT)
Dept: DIABETES SERVICES | Age: 32
End: 2022-11-15

## 2022-11-15 NOTE — TELEPHONE ENCOUNTER
Called patient back to see if we can schedule an earlier time do to pregnancy. Asked her to call me back.

## 2022-11-15 NOTE — TELEPHONE ENCOUNTER
Call to patient about Dexcom start. She has received product and will use phone. Do to school drop off and  times are limited.  Scheduled for 11- at 8:30 AM

## 2022-11-22 ENCOUNTER — OFFICE VISIT (OUTPATIENT)
Dept: OBGYN CLINIC | Age: 32
End: 2022-11-22
Payer: MEDICAID

## 2022-11-22 VITALS
SYSTOLIC BLOOD PRESSURE: 118 MMHG | HEIGHT: 66 IN | BODY MASS INDEX: 33.11 KG/M2 | WEIGHT: 206 LBS | DIASTOLIC BLOOD PRESSURE: 76 MMHG

## 2022-11-22 DIAGNOSIS — N97.0 INFERTILITY ASSOCIATED WITH ANOVULATION: ICD-10-CM

## 2022-11-22 DIAGNOSIS — Z86.32 HISTORY OF GESTATIONAL DIABETES: ICD-10-CM

## 2022-11-22 DIAGNOSIS — Z28.310 COVID-19 VACCINE SERIES DECLINED: ICD-10-CM

## 2022-11-22 DIAGNOSIS — Z28.21 COVID-19 VACCINE SERIES DECLINED: ICD-10-CM

## 2022-11-22 DIAGNOSIS — Z79.4 TYPE 2 DIABETES MELLITUS WITHOUT COMPLICATION, WITH LONG-TERM CURRENT USE OF INSULIN (HCC): ICD-10-CM

## 2022-11-22 DIAGNOSIS — E66.9 OBESITY (BMI 30-39.9): ICD-10-CM

## 2022-11-22 DIAGNOSIS — Z71.85 VACCINE COUNSELING: ICD-10-CM

## 2022-11-22 DIAGNOSIS — O09.91 HIGH-RISK PREGNANCY IN FIRST TRIMESTER: ICD-10-CM

## 2022-11-22 DIAGNOSIS — N92.6 MISSED MENSES: Primary | ICD-10-CM

## 2022-11-22 DIAGNOSIS — E11.9 TYPE 2 DIABETES MELLITUS WITHOUT COMPLICATION, WITH LONG-TERM CURRENT USE OF INSULIN (HCC): ICD-10-CM

## 2022-11-22 DIAGNOSIS — E28.2 PCOS (POLYCYSTIC OVARIAN SYNDROME): ICD-10-CM

## 2022-11-22 LAB
ABO + RH BLD: NORMAL
ERYTHROCYTE [DISTWIDTH] IN BLOOD BY AUTOMATED COUNT: 13.1 % (ref 11.9–14.6)
HCT VFR BLD AUTO: 40.5 % (ref 35.8–46.3)
HGB BLD-MCNC: 13 G/DL (ref 11.7–15.4)
MCH RBC QN AUTO: 27.7 PG (ref 26.1–32.9)
MCHC RBC AUTO-ENTMCNC: 32.1 G/DL (ref 31.4–35)
MCV RBC AUTO: 86.2 FL (ref 82–102)
NRBC # BLD: 0 K/UL (ref 0–0.2)
PLATELET # BLD AUTO: 302 K/UL (ref 150–450)
PMV BLD AUTO: 12.1 FL (ref 9.4–12.3)
RBC # BLD AUTO: 4.7 M/UL (ref 4.05–5.2)
WBC # BLD AUTO: 10.1 K/UL (ref 4.3–11.1)

## 2022-11-22 PROCEDURE — 76830 TRANSVAGINAL US NON-OB: CPT | Performed by: OBSTETRICS & GYNECOLOGY

## 2022-11-22 PROCEDURE — 3044F HG A1C LEVEL LT 7.0%: CPT | Performed by: OBSTETRICS & GYNECOLOGY

## 2022-11-22 PROCEDURE — 99204 OFFICE O/P NEW MOD 45 MIN: CPT | Performed by: OBSTETRICS & GYNECOLOGY

## 2022-11-22 RX ORDER — DOXYLAMINE SUCCINATE AND PYRIDOXINE HYDROCHLORIDE 20; 20 MG/1; MG/1
TABLET, EXTENDED RELEASE ORAL
Qty: 60 TABLET | Status: CANCELLED | OUTPATIENT
Start: 2022-11-22

## 2022-11-22 RX ORDER — PEN NEEDLE, DIABETIC 31 GX5/16"
NEEDLE, DISPOSABLE MISCELLANEOUS
COMMUNITY
Start: 2022-11-01

## 2022-11-22 RX ORDER — ONDANSETRON 8 MG/1
8 TABLET, ORALLY DISINTEGRATING ORAL EVERY 6 HOURS PRN
Qty: 30 TABLET | Refills: 2 | Status: SHIPPED | OUTPATIENT
Start: 2022-11-22

## 2022-11-22 RX ORDER — ASPIRIN 81 MG/1
162 TABLET ORAL DAILY
Qty: 90 TABLET | Refills: 4 | Status: SHIPPED | OUTPATIENT
Start: 2022-11-22

## 2022-11-22 NOTE — PROGRESS NOTES
CC:  Missed Period    HPI:  28 y.o. [id number] presents for NEW PT pregnancy confirmation and establish GORDON. Patient's last menstrual period was 2022. Virginie Bonilla       PCP:  Dr Patti Aggarwal   Also seeing Endocrine for T2DM      moderate n/v-- taking Zofran 4mg currently--> Rx for increase to 8mg Q 6 PRN as well as  Rx Bonjesta     no vb/cramping      Has had COVID 19 --2020  Has NOT  been COVID 19 vaccinated-- counseled and medical web sites given for reference         OB hx:  G1  2009 at 41w0d (7#)  G2 primary LTCS at 39wks on 17 w/ Dr Javon Viramontes due to FTP w/ NRFHTs; preg complicated by A1UE (insulin) w/ Polyhydramnios (7#15)     No issues conceiving 1st 2 pregnancies (prior to T2DM diagnosis)  Single  Current partner is FOB of G2           Hx LTCS x1:  Undecided on preference of RLTCS vs TOLAC  Counseled           Hx IMB, PCOS, infertility:   Conceived this pregnancy on Femara 5mg once hgb A1C much improved          T2DM, BMI 33:   now seeing Endocrine who is managing       Currently on insulin and Metformin--see meds     Hemoglobin A1c significantly elevated at 8.5 (22)    hgb A1C 6.4 (22)  Hgb A1C 5.8 (November)     Just got a Dex com  S/p MFM preconception counseling (22)--see note  Has been counseled on OB ideal parameters   Repeat hgb A1C today __     ASA 162mg      Mfm referral    Baseline HELLP labs__   Baseline P:C ratio __           Fam hx any chromosomal or inheritable disorders:        None          Her Ob history is as follows:  # 1 - Date: 09, Sex: Female, Weight: 7 lb (3.175 kg), GA: 41w0d, Delivery: Vaginal, Spontaneous, Apgar1: None, Apgar5: None, Living: Living, Birth Comments: None    # 2 - Date: 17, Sex: Male, Weight: 7 lb 14.6 oz (3.59 kg), GA: 39w1d, Delivery: , Low Transverse, Apgar1: 9, Apgar5: 10, Living: Living, Birth Comments: None    # 3 - Date: None, Sex: None, Weight: None, GA: None, Delivery: None, Apgar1: None, Apgar5: None, Living: None, Birth Comments: None      Past medical History: Active Ambulatory Problems     Diagnosis Date Noted    History of gestational diabetes 11/30/2016    Type 2 diabetes mellitus (HCC)     PCOS (polycystic ovarian syndrome)     Obesity (BMI 30-39.9) 10/10/2016    Irregular menstrual bleeding     Infertility associated with anovulation     History of kidney stones 06/09/2022    Uncontrolled type 2 diabetes mellitus with hyperglycemia ([address] 11/03/2022    COVID-19 vaccine series declined 11/27/2022     Resolved Ambulatory Problems     Diagnosis Date Noted    Polyhydramnios in third trimester 01/17/2017    High-risk pregnancy in third trimester 09/20/2016    Trichomonal infection 10/10/2016    Abnormal uterine bleeding due to endometrial polyp     Type 2 diabetes mellitus in pregnancy, first trimester 11/03/2022     Past Medical History:   Diagnosis Date    Anemia 2009    Nephrolithiasis     Poor dentition        Current Outpatient Medications   Medication Sig    B-D UF III MINI PEN NEEDLES 31G X 5 MM MISC USE TO INJECT LANTUS NIGHTLY    ondansetron (ZOFRAN ODT) 8 MG TBDP disintegrating tablet Place 1 tablet under the tongue every 6 hours as needed for Nausea or Vomiting    aspirin EC 81 MG EC tablet Take 2 tablets by mouth daily    ondansetron (ZOFRAN ODT) 4 MG disintegrating tablet Take 1 tablet by mouth every 8 hours as needed for Nausea or Vomiting    Continuous Blood Gluc Sensor (DEXCOM G6 SENSOR) MISC Change every 10 days as directed.  Dx E11.9    metFORMIN (GLUCOPHAGE-XR) 500 MG extended release tablet Take 1 tablet by mouth 2 times daily (before meals)    Continuous Blood Gluc Transmit (DEXCOM G6 TRANSMITTER) MISC Use to monitor blood glucose, E11.65    Continuous Blood Gluc Transmit (DEXCOM G6 TRANSMITTER) MISC Use to monitor blood glucose, E11.65    Insulin Glargine-yfgn 100 UNIT/ML SOPN 17 in am, 20 in pm    Accu-Chek Softclix Lancets MISC Use 4x daily to test BG.    famotidine (PEPCID) 20 MG tablet Take 1 tablet by mouth 2 times daily    Insulin Pen Needle (PEN NEEDLES) 30G X 5 MM MISC Use with insulin up to 6 times daily     DX: E11.9    blood glucose test strips (ACCU-CHEK GUIDE) strip CHECK BLOOD SUGAR FASTING BEFORE BREAKFAST THEN 1 HOUR AFTER THE FIRST BITE OF EACH MEAL (BREAKFAST, LUNCH, DINNER)    Prenatal Vit-Fe Fumarate-FA (PRENATAL VITAMIN PO) Take by mouth    ADMELOG SOLOSTAR 100 UNIT/ML SOPN 2-3 units before meals, 1 unit before snacks plus correction 1 per 25 over 125 AC/HS, max daily dose 30 units     No current facility-administered medications for this visit. Past Surgical:  has a past surgical history that includes Lithotripsy (05/10/2022);  section; orthopedic surgery (Left); Dilation and curettage of uterus (N/A, 2022); Lithotripsy (Left); and Cervical polyp removal (2022). Allergies   Allergen Reactions    Sulfa Antibiotics Rash         Current Outpatient Medications on File Prior to Visit   Medication Sig Dispense Refill    B-D UF III MINI PEN NEEDLES 31G X 5 MM MISC USE TO INJECT LANTUS NIGHTLY      ondansetron (ZOFRAN ODT) 4 MG disintegrating tablet Take 1 tablet by mouth every 8 hours as needed for Nausea or Vomiting 30 tablet 1    Continuous Blood Gluc Sensor (DEXCOM G6 SENSOR) MISC Change every 10 days as directed.  Dx E11.9 9 each 3    metFORMIN (GLUCOPHAGE-XR) 500 MG extended release tablet Take 1 tablet by mouth 2 times daily (before meals) 60 tablet 5    Continuous Blood Gluc Transmit (DEXCOM G6 TRANSMITTER) MISC Use to monitor blood glucose, E11.65 1 each 3    Continuous Blood Gluc Transmit (DEXCOM G6 TRANSMITTER) MISC Use to monitor blood glucose, E11.65 1 each 3    Insulin Glargine-yfgn 100 UNIT/ML SOPN 17 in am, 20 in pm 15 mL 11    Accu-Chek Softclix Lancets MISC Use 4x daily to test BG. 150 each 5    famotidine (PEPCID) 20 MG tablet Take 1 tablet by mouth 2 times daily 60 tablet 3    Insulin Pen Needle (PEN NEEDLES) 30G X 5 MM MISC Use with insulin up to 6 times daily     DX: E11.9 150 each 11    blood glucose test strips (ACCU-CHEK GUIDE) strip CHECK BLOOD SUGAR FASTING BEFORE BREAKFAST THEN 1 HOUR AFTER THE FIRST BITE OF EACH MEAL (BREAKFAST, LUNCH, DINNER) 100 strip 1    Prenatal Vit-Fe Fumarate-FA (PRENATAL VITAMIN PO) Take by mouth       No current facility-administered medications on file prior to visit. GYN hx:    Last Pap:  9/20/2016-- neg cytology, +Trich  Hx Abnl Paps: Only as above        Hx STDs:  Nella Peterson  reports that she has never smoked. She has never used smokeless tobacco. She reports that she does not drink alcohol and does not use drugs. /76   Ht 5' 6\" (1.676 m)   Wt 206 lb (93.4 kg)   LMP 09/20/2022   BMI 33.25 kg/m²     Physical Exam:  Constitutional: She appears well-developed and well-nourished. No distress. HENT:    Head: Normocephalic and atraumatic. Cardiovascular: Regular pulse   Pulmonary/Chest: Effort normal  Skin: She is not diaphoretic. Psychiatric: She has a normal mood and affect. Her behavior is normal. Thought content normal. .         Pelvic US today:  LMP 09/20/2022 GORDON(LMP) 06/27/2023 GA(LMP) 9w0d  GA(AUA) 9w1d  GORDON(AUA) 06/26/2023    + IUP WITH + FHM= 181 BPM. +YS. CRL= 9.1WKS. [address] NEAR THE INTERNAL OS. 2.1 X 0.9CM. RT OV- CL CYST. (OV SEEN TA ONLY). LT OV- WNL. (OV SEEN TA ONLY).                    Counseling:  -Continue PNV with at least [id number] Folic acid QD (4mg if previous pregnancy complicated by a fetal NTD)  -B6/Unisom (Diclegis) if nausea/vomitin g  -Calcium:  recommend 1000mg/QD  (500 in 2 Tums), milk, cheese, yogurt, leafy green vegetables  -Iron:  30mg every day (red meat, dark beans)  -Counseled on appropriate foods to avoid in pregnancy:   -unpasteurized milk/cheeses   -hot dogs, luncheon meats, cold cuts unless heated until steaming    -refrigerated doe and meat spreads   -raw/undercooked seafood, eggs, meat  -Avoid litter box if have cat(s)   -Genetic screening options discussed          Patient counseled face to face for more than 50% of the total time spent with the patient. On this date I have spent 46 minutes reviewing previous notes, test results, and face to face with the patient discussing the diagnosis and importance of compliance with the treatment plan as well as documenting.         Assessment/Plan:    28 y.o. [id number] NEW PT at 9w0d by d=9wk US with  IUP:    1) Routine pregnancy:  -PN labs today  -FU w/ RN in 1-2 wks for new pregnancy counseling   -RTO for New OB visti in 4wks   -MFM referral   -Asa 162mg   -hgb a1c  -baseline HELLP labs/P;C ratio   -Covid 19 vaccine benitosaliceing        Marleny Trimble MD

## 2022-11-23 LAB
ALBUMIN SERPL-MCNC: 3.9 G/DL (ref 3.5–5)
ALBUMIN/GLOB SERPL: 1.1 {RATIO} (ref 0.4–1.6)
ALP SERPL-CCNC: 51 U/L (ref 50–136)
ALT SERPL-CCNC: 22 U/L (ref 12–65)
ANION GAP SERPL CALC-SCNC: 7 MMOL/L (ref 2–11)
AST SERPL-CCNC: 11 U/L (ref 15–37)
BILIRUB SERPL-MCNC: 0.5 MG/DL (ref 0.2–1.1)
BLOOD GROUP ANTIBODIES SERPL: NORMAL
BUN SERPL-MCNC: 12 MG/DL (ref 6–23)
CALCIUM SERPL-MCNC: 10.6 MG/DL (ref 8.3–10.4)
CHLORIDE SERPL-SCNC: 106 MMOL/L (ref 101–110)
CO2 SERPL-SCNC: 26 MMOL/L (ref 21–32)
CREAT SERPL-MCNC: 0.7 MG/DL (ref 0.6–1)
CREAT UR-MCNC: 244 MG/DL
EST. AVERAGE GLUCOSE BLD GHB EST-MCNC: 117 MG/DL
GLOBULIN SER CALC-MCNC: 3.6 G/DL (ref 2.8–4.5)
GLUCOSE SERPL-MCNC: 90 MG/DL (ref 65–100)
HBA1C MFR BLD: 5.7 % (ref 4.8–5.6)
HBV SURFACE AG SER QL: NONREACTIVE
HCV AB SER QL: NONREACTIVE
HIV 1+2 AB+HIV1 P24 AG SERPL QL IA: NONREACTIVE
HIV 1/2 RESULT COMMENT: NORMAL
LDH SERPL L TO P-CCNC: 137 U/L (ref 100–190)
POTASSIUM SERPL-SCNC: 4.1 MMOL/L (ref 3.5–5.1)
PROT SERPL-MCNC: 7.5 G/DL (ref 6.3–8.2)
PROT UR-MCNC: 8 MG/DL
PROT/CREAT UR-RTO: 0
RPR SER QL: NONREACTIVE
RUBV IGG SERPL IA-ACNC: >500 IU/ML (ref 0–50)
SODIUM SERPL-SCNC: 139 MMOL/L (ref 133–143)
URATE SERPL-MCNC: 3.8 MG/DL (ref 2.6–6)

## 2022-11-25 ENCOUNTER — SCHEDULED TELEPHONE ENCOUNTER (OUTPATIENT)
Dept: ENDOCRINOLOGY | Age: 32
End: 2022-11-25
Payer: MEDICAID

## 2022-11-25 DIAGNOSIS — O24.111 TYPE 2 DIABETES MELLITUS IN PREGNANCY, FIRST TRIMESTER: ICD-10-CM

## 2022-11-25 DIAGNOSIS — E11.65 UNCONTROLLED TYPE 2 DIABETES MELLITUS WITH HYPERGLYCEMIA (HCC): Primary | ICD-10-CM

## 2022-11-25 LAB
BACTERIA SPEC CULT: NORMAL
SERVICE CMNT-IMP: NORMAL

## 2022-11-25 PROCEDURE — 99442 PR PHYS/QHP TELEPHONE EVALUATION 11-20 MIN: CPT | Performed by: PHYSICIAN ASSISTANT

## 2022-11-25 PROCEDURE — 95251 CONT GLUC MNTR ANALYSIS I&R: CPT | Performed by: PHYSICIAN ASSISTANT

## 2022-11-25 RX ORDER — INSULIN LISPRO 100 U/ML
INJECTION, SOLUTION SUBCUTANEOUS
Qty: 15 ML | Refills: 11
Start: 2022-11-25

## 2022-11-25 ASSESSMENT — ENCOUNTER SYMPTOMS: NAUSEA: 1

## 2022-11-25 NOTE — PROGRESS NOTES
Oscar South is a 28 y.o. female evaluated via audio-only technology on 11/25/2022 for No chief complaint on file. .      Now 9 weeks gestation  On prenatal vitamin and ASA  Last US earlier this week    Current Regimen: Yfgn 17am/20 in pm, admelog 2units before meal, 1 units before snack plus correction 1/25>125, metformin BID with some nausea      Home blood glucose monitoring frequency:   By review of CGM download over past 30 days  Average blood glucose 110 ± 25  Time in range 96.2%  High 1.4%, Very High 0.0%  Low 2.5%, Very Low 0.0%     Typical Standard Deviation   Fasting 108 17   AC lunch 98 15   AC supper 98 20   Bedtime 133 26     Blood glucose levels are uncontrolled, most significant elevations are at bedtime        Assessment & Plan:   Uncontrolled type 2 diabetes mellitus with hyperglycemia (HCC)  -     [facility] CONTINUOUS GLUCOSE MONITORING ANALYSIS I&R  -     ADMELOG SOLOSTAR 100 UNIT/ML SOPN; 2-3 units before meals, 1 unit before snacks plus correction 1 per 25 over 125 AC/HS, max daily dose 30 units, Disp-15 mL, R-11, DAWNO PRINT  Type 2 diabetes mellitus in pregnancy, first trimester  -     [facility] CONTINUOUS GLUCOSE MONITORING ANALYSIS I&R  -     ADMELOG SOLOSTAR 100 UNIT/ML SOPN; 2-3 units before meals, 1 unit before snacks plus correction 1 per 25 over 125 AC/HS, max daily dose 30 units, Disp-15 mL, R-11, DAWNO PRINT  Return as scheduled 12/14, for other diabetes f/u. Interpretation of 72 hour glucose monitor: At least 72 hours of data were reviewed. The patient utilizes a Dexcom G6 continuous glucose monitoring system. The average glucose during the reviewed timeframe was 110 with a standard deviation of 25. There is a pattern of frequent postprandial hyperglycemia after lunch and SUPPER. 1. Uncontrolled type 2 diabetes mellitus with hyperglycemia ([facility] [address]  Patient with insulin-dependent diabetes now 9 weeks gestation on basal bolus insulin regimen.   Patient is notable postprandial hyperglycemia after lunch and supper. We will increase her prandial insulin from her current 2 units and encouraged her to use 3 units at bigger meals including lunch and supper. She is not currently using correction scale at bedtime. I encouraged her to eat a high-protein snack with 1 unit of prandial snack time insulin and her current 1 per 25 greater than 125 correction AC/at bedtime. Patient continues to have some nausea limiting her use of metformin. I have encouraged her to take this FCI through supper as tolerated. Discussed sugar free nausea supplements to compliment her zofran    - [place] CONTINUOUS GLUCOSE MONITORING ANALYSIS I&R  - ADMELOG SOLOSTAR 100 UNIT/ML SOPN; 2-3 units before meals, 1 unit before snacks plus correction 1 per 25 over 125 AC/HS, max daily dose 30 units  Dispense: 15 mL; Refill: 11    2. Type 2 diabetes mellitus in pregnancy, first trimester  To 9 weeks gestation on prenatal vitamins and 162 mg of baby aspirin. Patient has appointment with [facility] in late December. We will follow-up with me here 1 last time at her scheduled December 14 appointment. We reviewed pathophysiology of diabetes in pregnancy as well as pregnancy targets and discussed some foods to avoid due to risk of Listeria   - [place] CONTINUOUS GLUCOSE MONITORING ANALYSIS I&R  - ADMELOG SOLOSTAR 100 UNIT/ML SOPN; 2-3 units before meals, 1 unit before snacks plus correction 1 per 25 over 125 AC/HS, max daily dose 30 units  Dispense: 15 mL; Refill: 11    Subjective:       Prior to Admission medications    Medication Sig Start Date End Date Taking?  Authorizing Provider   ADMELOG SOLOSTAR 100 UNIT/ML SOPN 2-3 units before meals, 1 unit before snacks plus correction 1 per 25 over 125 AC/HS, max daily dose 30 units 11/25/22  Yes Aydee Corrales PA-C   B-D UF III MINI PEN NEEDLES 31G X 5 MM MISC USE TO INJECT LANTUS NIGHTLY 11/1/22  Yes Historical Provider, MD   ondansetron (ZOFRAN ODT) 8 MG TBDP disintegrating tablet Place 1 tablet under the tongue every 6 hours as needed for Nausea or Vomiting 11/22/22  Yes Zenaida oH MD   aspirin EC 81 MG EC tablet Take 2 tablets by mouth daily 11/22/22  Yes Zenaida Ho MD   ondansetron (ZOFRAN ODT) 4 MG disintegrating tablet Take 1 tablet by mouth every 8 hours as needed for Nausea or Vomiting 11/14/22  Yes Zenaida Ho MD   Continuous Blood Gluc Sensor (DEXCOM G6 SENSOR) MISC Change every 10 days as directed.  Dx E11.9 11/10/22  Yes Dylan Corrales PA-C   metFORMIN (GLUCOPHAGE-XR) 500 MG extended release tablet Take 1 tablet by mouth 2 times daily (before meals) 11/3/22  Yes Estefaniajose Corrales PA-C   Continuous Blood Gluc Transmit (DEXCOM G6 TRANSMITTER) MISC Use to monitor blood glucose, E11.65 11/3/22  Yes Estefania Chay Corrales PA-C   Continuous Blood Gluc Transmit (DEXCOM G6 TRANSMITTER) MISC Use to monitor blood glucose, E11.65 11/3/22  Yes Estefania Bright MELINDA Corrales   Insulin Glargine-yfgn 100 UNIT/ML SOPN 17 in am, 20 in pm 11/3/22  Yes Estefania Chay Corrales PA-C   Accu-Chek Softclix Lancets MISC Use 4x daily to test BG. 11/3/22  Yes Estefania Bright MELINDA Corrales   famotidine (PEPCID) 20 MG tablet Take 1 tablet by mouth 2 times daily 11/1/22  Yes Sammi Hickman MD   Insulin Pen Needle (PEN NEEDLES) 30G X 5 MM MISC Use with insulin up to 6 times daily     DX: E11.9 10/28/22  Yes Aydee Corrales PA-C   blood glucose test strips (ACCU-CHEK GUIDE) strip CHECK BLOOD SUGAR FASTING BEFORE BREAKFAST THEN 1 HOUR AFTER THE FIRST BITE OF EACH MEAL (BREAKFAST, LUNCH, DINNER) 8/22/22  Yes Zenaida Ho MD   Prenatal Vit-Fe Fumarate-FA (PRENATAL VITAMIN PO) Take by mouth   Yes Historical Provider, MD     Allergies   Allergen Reactions    Sulfa Antibiotics Rash     Past Medical History:   Diagnosis Date    Anemia 2009    in pregnancy    History of gestational diabetes 2016    Infertility associated with anovulation     Irregular menstrual bleeding     Nephrolithiasis     PCOS (polycystic ovarian syndrome)     Poor dentition     Type 2 diabetes mellitus (HCC)     on oral and insulin, avg fbs 145, A1C 6.4 on 6/22/22     Review of Systems   Gastrointestinal:  Positive for nausea. No data recorded    Michael Pressley was evaluated through a patient-initiated, synchronous (real-time) audio only encounter. She (or guardian if applicable) is aware that it is a billable service, which includes applicable co-pays, with coverage as determined by her insurance carrier. This visit was conducted with the patient's (and/or Leopoldo Nestle guardian's) verbal consent. She has not had a related appointment within my department in the past 7 days or scheduled within the next 24 hours. The patient was located in a state where the provider was licensed to provide care. The patient was located at: Home:  Preston Baxter [address] [place] [id number]  The provider was located at:  Facility (Appt Dept): [address],  [place]    Total Time: minutes: 17 minutes    Yomi Orellana PA-C

## 2022-11-27 PROBLEM — Z28.21 COVID-19 VACCINE SERIES DECLINED: Status: ACTIVE | Noted: 2022-11-27

## 2022-11-27 PROBLEM — Z28.310 COVID-19 VACCINE SERIES DECLINED: Status: ACTIVE | Noted: 2022-11-27

## 2022-11-27 PROBLEM — O24.111: Status: RESOLVED | Noted: 2022-11-03 | Resolved: 2022-11-27

## 2022-11-27 NOTE — RESULT ENCOUNTER NOTE
PN labs wnl    Baseline HELLP labs wnl  Baseline P:C ratio 0.03      Hgb A1C continues to improve at  5.7

## 2022-11-30 ENCOUNTER — HOSPITAL ENCOUNTER (OUTPATIENT)
Dept: DIABETES SERVICES | Age: 32
Setting detail: RECURRING SERIES
Discharge: HOME | End: 2022-12-03
Payer: COMMERCIAL

## 2022-11-30 PROCEDURE — 95249 CONT GLUC MNTR PT PROV EQP: CPT

## 2022-11-30 NOTE — PROGRESS NOTES
Pt seen today for initial insertion of Dexcom G6 system. Pt instructed on overview of continuous glucose monitoring (CGM) device and use. Instructed on sensor, transmitter and . Android phone will be used. Pt instructed on download of Dexcom raoul to phone,  as well as Clarity raoul. Time and date and transmitter ID verified. Instructed on troubleshooting, alerts, alarms, calibration, communication between sensor and , insertion of sensor and transmitter, starting sensor session, start up calibration, ending sensor session, removing sensor pod and transmitter and site rotation. Reviewed CGM guidelines and restrictions on use including no MRI, CT scans, or diathermy electrical treatments, bug spray, sun tanning lotions medications such as-Tylenol greater than standard dose ( 1 gram or 1000 mg every 6 hours) can make Dexcom G6 sensor readings look higher than they really are, or Hydroxyurea used for some cancers or sickle cell anemia - Your sensor readings will be higher than your actual glucose. Inject insulin at least three inches from sensor site. Instructed only need to calibrate Dexcom G6, if blood glucose is 20 points off when blood glucose is under 70 mg/dl, or if blood sugar is 20% off when over 70 mg/dl. All questions and concerns addressed. Provided Dexcom technical support phone number. Patient is pregnant. Due to pregnancy, Dexcom will be placed in the arm. Will need at least an inch of fat. Instructed on rotation of sites not only for Dexcom but injections. [place] is an inch of fat And as pregnancy progresses will not be able to use abdomen as will not have an inch of fat for injections or Dexcom. Medication Reconciliation Completed, No surgery or procedures.

## 2022-12-06 ENCOUNTER — NURSE ONLY (OUTPATIENT)
Dept: OBGYN CLINIC | Age: 32
End: 2022-12-06

## 2022-12-06 VITALS — BODY MASS INDEX: 33.11 KG/M2 | HEIGHT: 66 IN | WEIGHT: 206 LBS

## 2022-12-06 DIAGNOSIS — O09.91 HIGH-RISK PREGNANCY IN FIRST TRIMESTER: Primary | ICD-10-CM

## 2022-12-06 NOTE — PROGRESS NOTES
NOB consult with pt. Labs today: NIPT/Carrier Screen. Offered pt the option of CF, SMA, and Fragile X carrier testing. Pt desires testing. Offered pt the option of genetic screening (1st screen vs Tetra vs NIPT). Pt desires NIPT. Instructed pt on exercise/nutrition in pregnancy. Reviewed [facility] preg book. Advised pt on using [facility] for hospital needs and [facility] L&D for pregnancy related emergencies. Pt states understanding. NOB forms signed, scanned, and given to pt. Medical Hx: Anemia. H/O GDM (G2). Infertility. Irregular Menses. Nephrolithiasis. PCOS. Poor dentition. Type 2 DM. Surgical Hx: Cervical polyp removal. . D&C. Lithotripsy. Orthopedic surgery (left knee)    Last Pap: 16 WNL. Pt notified pap smear and std screening will be done at [place]. Pt OB c/o: none    Fam hx any chromosomal or inheritable disorders: none     COVID Vaccine: declines    Flu Vaccine: declines    OB hx: G1  2009 at 41w0d (7#)   G2 primary LTCS at 39wks on 17 w/ Dr Kacy Steen due to FTP w/ NRFHTs; preg complicated SH [id number]  (insulin) w/ Polyhydramnios (7#15)   *No h/o GHTN, PreE    [facility]: Jordy Canada on 22 with . [facility] on 22. NIPT and Carrier Screen collected and sent to [facility] via [employer].

## 2022-12-14 ENCOUNTER — HOSPITAL ENCOUNTER (OUTPATIENT)
Dept: DIABETES SERVICES | Age: 32
Setting detail: RECURRING SERIES
Discharge: HOME | End: 2022-12-17
Payer: COMMERCIAL

## 2022-12-14 ENCOUNTER — OFFICE VISIT (OUTPATIENT)
Dept: ENDOCRINOLOGY | Age: 32
End: 2022-12-14
Payer: COMMERCIAL

## 2022-12-14 VITALS — WEIGHT: 205 LBS | BODY MASS INDEX: 33.09 KG/M2 | SYSTOLIC BLOOD PRESSURE: 122 MMHG | DIASTOLIC BLOOD PRESSURE: 83 MMHG

## 2022-12-14 VITALS — BODY MASS INDEX: 33.25 KG/M2 | WEIGHT: 206 LBS

## 2022-12-14 DIAGNOSIS — O24.111 TYPE 2 DIABETES MELLITUS IN PREGNANCY, FIRST TRIMESTER: ICD-10-CM

## 2022-12-14 DIAGNOSIS — E11.65 UNCONTROLLED TYPE 2 DIABETES MELLITUS WITH HYPERGLYCEMIA (HCC): Primary | ICD-10-CM

## 2022-12-14 PROCEDURE — G0109 DIAB MANAGE TRN IND/GROUP: HCPCS

## 2022-12-14 PROCEDURE — 3044F HG A1C LEVEL LT 7.0%: CPT | Performed by: PHYSICIAN ASSISTANT

## 2022-12-14 PROCEDURE — 95251 CONT GLUC MNTR ANALYSIS I&R: CPT | Performed by: PHYSICIAN ASSISTANT

## 2022-12-14 PROCEDURE — 99214 OFFICE O/P EST MOD 30 MIN: CPT | Performed by: PHYSICIAN ASSISTANT

## 2022-12-14 RX ORDER — INSULIN LISPRO 100 U/ML
INJECTION, SOLUTION SUBCUTANEOUS
Qty: 15 ML | Refills: 11
Start: 2022-12-14

## 2022-12-14 RX ORDER — BLOOD SUGAR DIAGNOSTIC
STRIP MISCELLANEOUS
Qty: 150 STRIP | Refills: 3 | Status: SHIPPED | OUTPATIENT
Start: 2022-12-14

## 2022-12-14 RX ORDER — INSULIN GLARGINE-YFGN 100 [IU]/ML
INJECTION, SOLUTION SUBCUTANEOUS
Qty: 15 ML | Refills: 11 | Status: SHIPPED | OUTPATIENT
Start: 2022-12-14

## 2022-12-14 RX ORDER — PEN NEEDLE, DIABETIC 31 GX5/16"
NEEDLE, DISPOSABLE MISCELLANEOUS
Qty: 150 EACH | Refills: 11 | Status: SHIPPED | OUTPATIENT
Start: 2022-12-14

## 2022-12-14 RX ORDER — METFORMIN HYDROCHLORIDE 500 MG/1
500 TABLET, EXTENDED RELEASE ORAL
Qty: 30 TABLET | Refills: 5 | Status: SHIPPED | OUTPATIENT
Start: 2022-12-14

## 2022-12-14 NOTE — PROGRESS NOTES
Gestational Diabetes Self-Management Support Plan: Pt has type 2 diabetes    Services Provided: Pt received education on nutrition and meal planning during pregnancy. Emotional support for adjustment to diagnosis was provided. Care Plan/Recommendations:  Pt instructed to record blood sugar 4x/day and record all meals and snacks. Pt to bring information to appointments with [facility]. Notes for Follow Up:   1. Barriers to checking blood glucose and adherence to meal plan identified: none. Pt has CGM. 2. Barriers to psychological adjustment to diagnosis identified: none  3. Patient needs to be seen by [facility] Fetal Medicine ASAP due to: appointment is 12/19/22.       Nellie Dress, [address], [place], CDE  [facility] [facility]

## 2022-12-14 NOTE — PROGRESS NOTES
[facility] ENDOCRINOLOGY   AND   THYROID NODULE CLINIC    Abel Villalba PA-C  [facility] Endocrinology and Thyroid Nodule Clinic  [address], [address]  [place], [place]  Phone [phone]  Facsimile [phone]          Merlinda Boll is a 28 y.o. female seen 12/14/2022 for follow up evaluation of type 2 diabetes now pregnant        Assessment and Plan:    In office COVID-19 PPE worn and precautions taken    Interpretation of 72 hour glucose monitor: At least 72 hours of data were reviewed. The patient utilizes a dexcom G6 continuous glucose monitoring system. The average glucose during the reviewed timeframe was 116 with a standard deviation of 24. There is a pattern of frequent but mild postprandial hyperglycemia after meals when taking insulin at mealtime. 1. Uncontrolled type 2 diabetes mellitus with hyperglycemia ([address]  Much improved on current treatment regimen. Tolerating only once daily metfomrin. Increase prandial insulin from 2 units up to 3 units per meal, 4 units for high carb meals. Continue 1/25>125 correction    Fastings are above goal increase basal from 17 in am up to 18 and from 22 up to 24 units in evening. Discussed timing to help facilitate compliance. To establish with MFM next week and will return to practice after delivery       - ADMELOG SOLOSTAR 100 UNIT/ML SOPN; 3-4 units before meals, 2 unit before snacks plus correction 1 per 25 over 125 AC/HS, max daily dose 30 units  Dispense: 15 mL; Refill: 11  - metFORMIN (GLUCOPHAGE-XR) 500 MG extended release tablet; Take 1 tablet by mouth daily (before dinner)  Dispense: 30 tablet; Refill: 5  - blood glucose test strips (ACCU-CHEK GUIDE) strip; Check glucose and calibrate CGM up to twice daily E11.65  Dispense: 150 strip; Refill: 3  - B-D UF III MINI PEN NEEDLES 31G X 5 MM MISC; Use with insulin up to 5 time daily E11.65  Dispense: 150 each;  Refill: 11  - Insulin Glargine-yfgn 100 UNIT/ML SOPN; 18 in am, 24 in pm  Dispense: 15 mL; Refill: 11  - ME CONTINUOUS GLUCOSE MONITORING ANALYSIS I&R        2. Type 2 diabetes mellitus in pregnancy, first trimester  Now 12 weeks gestation, on ASA and prenatals. To establish with MFM next week and will return to practice after delivery   - ADMELOG SOLOSTAR 100 UNIT/ML SOPN; 3-4 units before meals, 2 unit before snacks plus correction 1 per 25 over 125 AC/HS, max daily dose 30 units  Dispense: 15 mL; Refill: 11  - metFORMIN (GLUCOPHAGE-XR) 500 MG extended release tablet; Take 1 tablet by mouth daily (before dinner)  Dispense: 30 tablet; Refill: 5  - blood glucose test strips (ACCU-CHEK GUIDE) strip; Check glucose and calibrate CGM up to twice daily E11.65  Dispense: 150 strip; Refill: 3  - B-D UF III MINI PEN NEEDLES 31G X 5 MM MISC; Use with insulin up to 5 time daily E11.65  Dispense: 150 each; Refill: 11  - Insulin Glargine-yfgn 100 UNIT/ML SOPN; 18 in am, 24 in pm  Dispense: 15 mL; Refill: 11      Connye Buerger was seen today for diabetes. Diagnoses and all orders for this visit:    Uncontrolled type 2 diabetes mellitus with hyperglycemia (HCC)  -     ADMELOG SOLOSTAR 100 UNIT/ML SOPN; 3-4 units before meals, 2 unit before snacks plus correction 1 per 25 over 125 AC/HS, max daily dose 30 units  -     metFORMIN (GLUCOPHAGE-XR) 500 MG extended release tablet; Take 1 tablet by mouth daily (before dinner)  -     blood glucose test strips (ACCU-CHEK GUIDE) strip;  Check glucose and calibrate CGM up to twice daily E11.65  -     B-D UF III MINI PEN NEEDLES 31G X 5 MM MISC; Use with insulin up to 5 time daily E11.65  -     Insulin Glargine-yfgn 100 UNIT/ML SOPN; 18 in am, 24 in pm  -     ME CONTINUOUS GLUCOSE MONITORING ANALYSIS I&R    Type 2 diabetes mellitus in pregnancy, first trimester  -     ADMELOG SOLOSTAR 100 UNIT/ML SOPN; 3-4 units before meals, 2 unit before snacks plus correction 1 per 25 over 125 AC/HS, max daily dose 30 units  -     metFORMIN (GLUCOPHAGE-XR) 500 MG extended release tablet; Take 1 tablet by mouth daily (before dinner)  -     blood glucose test strips (ACCU-CHEK GUIDE) strip; Check glucose and calibrate CGM up to twice daily E11.65  -     B-D UF III MINI PEN NEEDLES 31G X 5 MM MISC; Use with insulin up to 5 time daily E11.65  -     Insulin Glargine-yfgn 100 UNIT/ML SOPN; 18 in am, 24 in pm          History of Present Illness:        12/14/2022   Interim diabetes HPI:    Now 12 weeks gestation, on ASA and prenatals  Current Regimen: yfgn 17 in am, 22 in Pm, admelog 2 units AC meals plus correction [facility] 1/25>125    11/3/2022  Interim diabetes HPI:  Patient with PCOS and type 2 diabetes started on metformin at last visit to help in increase fertility found out she is pregnant, last menstrual period was Sept 20. Has scheduled visit with OB, taking prenatal vitamins. Started on sliding scale insulin    Interim medical history changes:   Now pregnant, started on sliding scale insulin, had adjustment of basal insulin due to fasting highs, now splitting, remains high in am     Lifestyle Update:  Frequent checking and premeal correction    Glucose data:     Home blood glucose monitoring frequency:   By review of CGM download over past 30 days  Average blood glucose 116 ± 24  Time in range 98.5%  High 1.2%, Very High 0.0%  Low 0.3%, Very Low 0.0%     Typical Standard Deviation   Fasting 112 15   AC lunch 101 21   AC supper 105 23   Bedtime 128 23     Blood glucose levels are uncontrolled, most significant elevations are post prandial and fasting for pregnancy targets      Failed past therapies:   IR metformin with malaise     Relevant co morbidities:  PCOS    Denies  HX pancreatitis, DKA, gastroparesis, foot ulcer    Optho:    Last eye exam was Sept 2022 and demonstrated no diabetic retinopathy. Obesity:         Body mass index is 33.09 kg/m².        stable      Wt Readings from Last 3 Encounters:   12/14/22 205 lb (93 kg)   12/14/22 206 lb (93.4 kg)   12/06/22 206 lb (93.4 kg)         CardioVascular:    None     Renal:                            05/03/2022      Cr 0.93, GFR >60       Lipids:     Current therapy none    No results found for: CHOL, TRIGL, HDL, LDLC, VLDL    Hemoglobin A1c:    04/13/2022 8.5%    06/22/2022 6.4%.    08/17/2022 6.3%    11/03/2022 5.8%       Thyroid:       04/13/2022    TSH  1.310    Free T4 1.26    Allergies & Medications:    Reviewed in chart. Review of Systems    Vital Signs:  /83 (Site: Right Upper Arm, Position: Sitting)   Wt 205 lb (93 kg)   LMP 09/20/2022 (Approximate)   BMI 33.09 kg/m²       Physical Exam  Constitutional:       Appearance: Normal appearance. HENT:      Head: Normocephalic. Neck:      Thyroid: No thyroid mass or thyromegaly. Vascular: No carotid bruit. Cardiovascular:      Rate and Rhythm: Normal rate and regular rhythm. Pulmonary:      Effort: Pulmonary effort is normal.      Breath sounds: Normal breath sounds. Abdominal:      Palpations: Abdomen is soft. Musculoskeletal:      Cervical back: Neck supple. Right lower leg: No edema. Left lower leg: No edema. Feet:      Right foot:      Protective Sensation: 3 sites tested. 3 sites sensed. Skin integrity: Skin integrity normal.      Left foot:      Protective Sensation: 3 sites tested. 3 sites sensed. Skin integrity: Skin integrity normal.   Lymphadenopathy:      Cervical: No cervical adenopathy. Skin:     General: Skin is warm and dry. Neurological:      General: No focal deficit present. Mental Status: She is alert. Sensory: Sensation is intact. Psychiatric:         Mood and Affect: Mood normal.         Behavior: Behavior normal.         Thought Content: Thought content normal.         Judgment: Judgment normal.           Return after pregnancy. Portions of this note were generated with the assistance of voice recogniton software. As such, some errors in transcription may be present.

## 2022-12-14 NOTE — Clinical Note
Pt known to your practice with type 2 diabetes, now pregnant, to re-establish care with you next week. Now on dexcom and basal bolus insulin regimen. Stable glycemic control and good prenatal follow up. We will plan to see her after delivery.  Thank you. ~gregory

## 2022-12-14 NOTE — PATIENT INSTRUCTIONS
INSULIN ADMINISTRATION INSTRUCTIONS:    Patient Name:  Jonny Record   YOB: 1990    Provider Name:  Rhonda Montano PA-C  Today's Date:  12/14/22      LONG-ACTING INSULIN     YFGN    18 units in am  24 units in pm    The purpose of long-acting insulin is to try to achieve fasting (first thing in the morning) blood glucose in the morning . If fasting blood glucose is persistently above 125, increase the long-acting insulin dose by 2 units every 3 days until fasting blood glucose is persistently . If fasting blood glucose is persistently less than 80, decrease the long-acting insulin dose by 2 units every 3 days until fasting blood glucose is persistently . Leave the dose at whichever dose it takes to keep fasting blood glucose . SHORT-ACTING INSULIN     Admelog  3 units before meal  2 units before snacks    Be sure to check blood glucose before meals and at bedtime. Based on the blood glucose reading, take the following amount of insulin:    Blood glucose 125 or less  No extra insulin  Blood glucose 126-150  1 units  Blood glucose 151-175  2 units  Blood glucose 176-200  3 units  Blood glucose 201-225  4 units  Blood glucose 226-250  5 units  Blood glucose 251-275  6 units  Blood glucose  276-300  7 units  Blood glucose 301-325  8 units  Blood glucose  326-350  9 units  Blood glucose greater than 350 10 units      If there are questions, send an electronic Ztory message to for nonurgent questions or call the office at [phone].     [facility] ENDOCRINOLOGY  [address] [place] [phone]

## 2022-12-19 ENCOUNTER — ROUTINE PRENATAL (OUTPATIENT)
Dept: OBGYN CLINIC | Age: 32
End: 2022-12-19

## 2022-12-19 VITALS — DIASTOLIC BLOOD PRESSURE: 78 MMHG | SYSTOLIC BLOOD PRESSURE: 118 MMHG

## 2022-12-19 DIAGNOSIS — O09.01 ENCOUNTER FOR SUPERVISION OF HIGH-RISK PREGNANCY WITH HISTORY OF INFERTILITY IN FIRST TRIMESTER: ICD-10-CM

## 2022-12-19 DIAGNOSIS — Z86.32 HISTORY OF GESTATIONAL DIABETES: ICD-10-CM

## 2022-12-19 DIAGNOSIS — E11.65 UNCONTROLLED TYPE 2 DIABETES MELLITUS WITH HYPERGLYCEMIA (HCC): ICD-10-CM

## 2022-12-19 DIAGNOSIS — Z97.8 USES SELF-APPLIED CONTINUOUS GLUCOSE MONITORING DEVICE: ICD-10-CM

## 2022-12-19 DIAGNOSIS — Z13.32 ENCOUNTER FOR SCREENING FOR MATERNAL DEPRESSION: ICD-10-CM

## 2022-12-19 DIAGNOSIS — Z3A.12 12 WEEKS GESTATION OF PREGNANCY: ICD-10-CM

## 2022-12-19 DIAGNOSIS — Z87.442 HISTORY OF KIDNEY STONES: ICD-10-CM

## 2022-12-19 DIAGNOSIS — O34.80 POLYCYSTIC OVARY AFFECTING PREGNANCY, ANTEPARTUM: ICD-10-CM

## 2022-12-19 DIAGNOSIS — O24.111 PREGNANCY WITH TYPE 2 DIABETES MELLITUS IN FIRST TRIMESTER: ICD-10-CM

## 2022-12-19 DIAGNOSIS — E28.2 POLYCYSTIC OVARY AFFECTING PREGNANCY, ANTEPARTUM: ICD-10-CM

## 2022-12-19 ASSESSMENT — PATIENT HEALTH QUESTIONNAIRE - PHQ9
2. FEELING DOWN, DEPRESSED OR HOPELESS: 0
SUM OF ALL RESPONSES TO PHQ9 QUESTIONS 1 & 2: 0
SUM OF ALL RESPONSES TO PHQ QUESTIONS 1-9: 0
SUM OF ALL RESPONSES TO PHQ QUESTIONS 1-9: 0
1. LITTLE INTEREST OR PLEASURE IN DOING THINGS: 0
SUM OF ALL RESPONSES TO PHQ QUESTIONS 1-9: 0
SUM OF ALL RESPONSES TO PHQ QUESTIONS 1-9: 0

## 2022-12-19 NOTE — PROGRESS NOTES
MFM Consultation    Oscar South (: 1990) is a 28 y.o. Ashly Bale at [address] with 2023, by Last Menstrual Period. Presents for evaluation of the following chief complaint(s):  High Risk Pregnancy (DM2, H/O Infertility)     Pt was seen previously in 2022 for  for DM2. This pregnancy was assisted with Femara. Patient is  [facility] . Scheduled to see primary OB [facility]) on 2022. DM2: See Problem List. CGM reviewed. Pt has improved her A1c with dietary changes and insulin. No HAs, edema. Has severe nausea; taking Zofran 8 mg for; advised re: constipation and OTC meds. No bleeding, LOF, cramping, ctxs, or vaginal pressure. Pregnancy has been complicated by infertility r/t DM2. History reviewed and updated as needed. Review of Systems - per HPI; otherwise unremarkable. Exam:     Vitals:    22 1349   BP: 118/78     Ultrasound: Please see formal ultrasound report under imaging tab. ASSESSMENT/PLAN:  Patient Active Problem List    Diagnosis Date Noted    Pregnancy with type 2 diabetes mellitus in first trimester      Priority: High     T2DM, BMI 33:   now seeing Endocrine who is managing       Currently on insulin and Metformin--see meds     Hemoglobin A1c significantly elevated at 8.5 (22)    hgb A1C 6.4 (22)  Hgb A1C 5.8 (November)     Just got a Dex com  S/p MFM preconception counseling (22)--see note  Has been counseled on OB ideal parameters   Repeat hgb A1C today __     ASA 162mg      Mfm referral    Baseline HELLP labs__   Baseline P:C ratio __     22 [facility]: Pt is using Dexcom; improved A1c at beginning of pregnancy was 5.7%. Currently on Insulin Glargine yfgn , Admelog 3 units with meals; 4 units with carb-heavy meals; 2 units with snacks. Dexcom report reviewed provided by pt; Avg 116; SD 24 mg/dL. Continue current dosing. Invite sent to pt for [facility] sync.        Uses self-applied continuous glucose monitoring device 2022 Priority: Medium    COVID-19 vaccine series declined 2022     Priority: Medium    Uncontrolled type 2 diabetes mellitus with hyperglycemia ([facility] [address] 2022     Priority: Medium     hgb 8.5 (22)  Hgb A1C continues to improve at 5.7 (22)       History of kidney stones 2022     Priority: Medium     2022 [facility]: flomax tolerated; s/p lithotripsy, still with residual pieces and small stones. 22 [facility]: No recent issues with kidney stones. Polycystic ovary affecting pregnancy, antepartum      Priority: Medium    Encounter for supervision of high-risk pregnancy with history of infertility in first trimester      Priority: Low     Hx IMB, PCOS, infertility:   Conceived this pregnancy on Femara 5mg once hgb A1C much improved    22 [facility]: Normal NT and nasal bone. Genetic counseling done by MD.  Low risk NIPT in OB office. History of gestational diabetes 2016     Priority: Low     Pt had in 2017 pregnancy with polyhydramnios, insulin controlled. 1.) Diabetes Mellitus Type 2  Patient has a history of type 2 diabetes mellitus predating this pregnancy. Sees Abel Villalba outside of pregnancy    Patient currently uses scheduled insulin. Patient has CGM. Patient currently demonstrates good adherence to recommended therapy. Patient currently demonstrates good understanding of the disease process and appropriate diet and lifestyle. A1c at initiation of pregnancy is 5.7%. Poorly controlled diabetes in pregnancy is associated with poor fetal outcomes. First trimester elevations in hgb A1c are associated with fetal malformations including cardiac defects (4x baseline risk), caudal regression (252x), neural tube defects (with anencephaly, myelomeningocele, and/or hydrocephalus) (2-3x), situs inversus (84x), renal defects (4-6x). Poor control later in pregnancy is associated with macrosomia in at least 25% and 3-4x increased risk of  death.  If maternal vascular disease, the risk of IUGR increases. Maternal complications may be associated with elevated glucose values as well as vascular changes due to long standing diabetes. Risk of preeclampsia in a patient with preexisting vascular disease is 27%, with preexisting renal disease 40-50%. If no preexisting vascular or renal disease, the risk of preeclampsia is still 14% which is twice the rate in the non-diabetic population. The risk of  delivery increases to 40-50% in this population. Stressed importance of tight BS control in order to decrease possibility of poor outcome, including macrosomia, surgical delivery, IUFD, and maternal complications, with poor BS control. Instructed on effects of carbs, sugar, & insulin usage in the body and differences in diabetes in pregnancy. Discussed setting healthy lifestyle goals that can have a big impact on controlling blood sugars and reducing diabetic complications. Discussed on the go snacks, healthy choices for eating out, portion size, and adapting current food preferences in order have good glycemic control. Stressed importance of eating from all food groups and start incorporating 1 fruit & 2 vegetable servings daily & gradually workup to more vegetable servings daily. Limitation of carbohydrates to 100-150gm/day encouraged, but to avoid significant restriction <50 gm/day. Also, discussed exercise and the impact of lowering blood sugar. Encouraged to walk or other exercise up to 30 mins daily. Instructed pt on appropriate diet. Pt selected food choices and an individualized meal plan was made. Pt verbalizes a better understanding of how to select food choices for meals. Instructed on hypoglycemia an how to treat lows. Pt is to check FBS & 1 hour after meals. Stressed importance of tight blood sugar control during pregnancy with goal of FBS < 95 and 1hPP < 120.   Instructed pt to send in blood sugars weekly, every 2-3 days with elevations. Infants of diabetic mothers with poor control are at risk for  hypoglycemia. With poor control, polyhydramnios, or macrosomia, fetus is at risk for metabolic syndrome later in life. This risk may be decreased by exposure to at least 6-12 months of breastfeeding. Consideration of  milk expression program encouraged. Discussed with patient the techniques for monitoring sugars at home. Reviewed diabetic diet and carbohydrate counting. Stressed importance of tight blood sugar control of fasting less than 95 and 1-hour post prandial blood sugars less than 120. She will call with elevated BS. Stressed need to send logs weekly to our office and your office. Reviewed the increased risk of congenital anomalies based on HgbA1c values  Reviewed plans for fetal anatomy and echo of heart at 18-19 weeks with repeat fetal echo at 24 weeks. Discussed need for  testing in 3rd trimester twice weekly, as well as, serial growth surveillance.  milk expression program beginning at 37 weeks. Plan for delivery at 39 weeks, unless concerns for maternal or fetal well-being.     2.) Obesity in Pregnancy  Preconception BMI ? 30 increases risk for pregnancy complications, including gestational diabetes, poor or accelerated fetal growth, hypertensive disorders of pregnancy, and abnormal labor progression. In addition, there is an increased risk of fetal demise, as well as congenital anomalies including neural tube defects, cardiac malformations, orofacial defects, and limb eduction abnormalities. The risk for stillbirth increases with increasing obesity: class I obesity 1.3 [1.2-1.4], class II obesity 1.4 [1.3-1.6], class III obesity 1.9 [1.3-1.6]) and higher stillbirth risk in  obese women (1.9 [1.7-2.1]) than in  obese women (1.4 [1.3-1.5]).  Among women with class III obesity (BMI ?40 kg/m2), the risk for stillbirth increased with advancing gestational age: 27 to 29 weeks, hazard and risk ratios 1.40 and 1.69, respectively; 37 to 39 weeks, hazard and risk ratios 3.20 and 2.95, respectively; and 40 to 42 weeks, hazard and risk ratios 3.30 and 8.95, respectively. Recommend level II ultrasound for anatomy and fetal echo if diagnosis of obesity. Consider  testing beginning at 32-36 weeks due to risks of fetal demise, timing dependent on maternal and fetal comorbidities. Early evaluation of glucose resistance with HgbA1c at intitation of care- if a1c > 5.5, then follow up with either \"2 step\" 1hr GCT/ 3hr GTT OR \"1 step\" 2hr GTT  Closely monitor blood pressure for development/worsening of hypertensive disorders of pregnancy. Weight Gain Goal: <15 pounds, it is ok to stay same weight or lose as long as baby growing well  Dietary choices- low carb fine, avoid extreme keto (goal >50-75gm carb/day); not to use intermittent/prolonged fasting without specific discussion with physician. Continue activity/exercise     Patient counseled re need to optimize both maternal and fetal health by remaining active, limiting weight gain, and modifying food choices. She understands that if obesity worsens, then will need to meet with anesthesia and as a team determine safest location for delivery. 3. Genetic counseling was performed by physician after reviewing patient's genetic history. The patient's Down syndrome age associated risk, as well as, risks of additional aneuploidy and genetic syndromes, are reduced by approximately 50% with a normal first trimester anatomy including, nuchal translucency and nasal bone. Ultrasound alone does not rule out all abnormalities of genetics and development.      Maternal serum screening for aneuploidy was discussed with the patient including first trimester BONNIE-A/hCG, second trimester Quad screen (either in isolation or sequential with BONNIE-A) as well as non-invasive prenatal testing (NIPT) for aneuploidy from a maternal blood sample. Positive predictive and negative predictive values for these tests were explained, questions answered. Patient understands that these are screening tests that only assesses risk for select abnormalities (trisomies 15, 25, and 21, and sex chromosome abnormalities (NIPT), as well as markers for placental health (BONNIE-A) and risk for open neural tube defects (quad)). NIPT is designed for high risk populations, but should be considered by all patients who desire the current best option for screening for applicable genetic abnormalities. Limitations of technology discussed based on maternal age, technical aspects of tests, and maternal BMI reviewed. All questions answered and concerns discussed. Patient elected to proceed with NIPT previously at [facility] office- results low risk. 4. Mood evaluated today; PHQ2 reviewed. Counseling re possibility of peripartum worsening. Mood Reassuring today  Recommend lifestyle/behavioral modifications to enhance mood (exercise, sunshine, mood apps, nutritional modifications, etc). Addressed normal pregnancy complaints, reassured and offered suggestions for care  Reviewed gestational age precautions and activity goals/limitations  Nutritional counseling as well as specific goals based on current maternal and fetal status  Options for GERD therapy if becomes symptomatic over course of pregnancy  Gestational age appropriate preventive care regarding communicable disease transmission and vaccines as appropriate (including flu, TDaP, and COVID.)  Additional plans and concerns as documented in problem list.   All questions answered and concerns discussed.     I have spent 55 minutes reviewing previous notes, test results and face to face with the patient discussing the diagnosis and importance of compliance with the treatment plan, in addition to ultrasound findings, as well as documenting on the day of the visit (12/19/2022)    Return for 4-6 weeks for early anatomy, DM2 F/U.     Patient Active Problem List   Diagnosis Code    History of gestational diabetes Z86.32    Pregnancy with type 2 diabetes mellitus in first trimester O24.111    Polycystic ovary affecting pregnancy, antepartum O34.80, E28.2    Encounter for supervision of high-risk pregnancy with history of infertility in first trimester O09.01    History of kidney stones Z87.442    Uncontrolled type 2 diabetes mellitus with hyperglycemia ([facility] [address] E11.65    COVID-19 vaccine series declined Z28.21, Z28.310    Uses self-applied continuous glucose monitoring device Z97.8     Visit Diagnoses         Codes    BMI 33.0-33.9,adult    -  Primary Z68.33    12 weeks gestation of pregnancy     Z3A.12    Encounter for screening for maternal depression     Z13.32

## 2023-01-03 ENCOUNTER — ROUTINE PRENATAL (OUTPATIENT)
Dept: OBGYN CLINIC | Age: 33
End: 2023-01-03
Payer: COMMERCIAL

## 2023-01-03 VITALS — SYSTOLIC BLOOD PRESSURE: 110 MMHG | DIASTOLIC BLOOD PRESSURE: 72 MMHG | WEIGHT: 202 LBS | BODY MASS INDEX: 32.6 KG/M2

## 2023-01-03 DIAGNOSIS — Z12.4 PAP SMEAR FOR CERVICAL CANCER SCREENING: ICD-10-CM

## 2023-01-03 DIAGNOSIS — Z11.3 SCREEN FOR STD (SEXUALLY TRANSMITTED DISEASE): ICD-10-CM

## 2023-01-03 DIAGNOSIS — Z12.4 PAP SMEAR FOR CERVICAL CANCER SCREENING: Primary | ICD-10-CM

## 2023-01-03 DIAGNOSIS — Z11.51 SCREENING FOR HPV (HUMAN PAPILLOMAVIRUS): ICD-10-CM

## 2023-01-03 DIAGNOSIS — O09.01 ENCOUNTER FOR SUPERVISION OF HIGH-RISK PREGNANCY WITH HISTORY OF INFERTILITY IN FIRST TRIMESTER: ICD-10-CM

## 2023-01-03 DIAGNOSIS — O24.111 PREGNANCY WITH TYPE 2 DIABETES MELLITUS IN FIRST TRIMESTER: ICD-10-CM

## 2023-01-03 PROCEDURE — 99213 OFFICE O/P EST LOW 20 MIN: CPT | Performed by: OBSTETRICS & GYNECOLOGY

## 2023-01-03 NOTE — PROGRESS NOTES
CC: New OB exam    HPI:  28 y.o.  Asiya Tao presents today for a New OB examination at 15w0d  by  d=9. Pt also with complaints of none. No n/v--tolerating PO  No cramping/VB    Genetics: Low risk NIPT      OB HISTORY:   OB History          3    Para   2    Term   2       0    AB   0    Living   2         SAB   0    IAB   0    Ectopic   0    Molar   0    Multiple   0    Live Births   2                Family history of obstetric issues, genetic abnormalities:  no       GYN HISTORY:  Last Pap:  2016  Hx of Abnl Paps: no  History of STDs:  no      No flowsheet data found. No flowsheet data found.     Past Medical History:   Diagnosis Date    Anemia 2009    in pregnancy    COVID-19 vaccine series declined     History of gestational diabetes     Infertility associated with anovulation     Irregular menstrual bleeding     Nephrolithiasis     PCOS (polycystic ovarian syndrome)     Poor dentition     Type 2 diabetes mellitus (HCC)     on oral and insulin, avg fbs 145, A1C 6.4 on 22         Past Surgical History:   Procedure Laterality Date    CERVICAL POLYP REMOVAL  2022     SECTION      DILATION AND CURETTAGE OF UTERUS N/A 2022    DILATATION AND CURETTAGE HYSTEROSCOPY performed by Cj Goyal MD at [facility]    LITHOTRIPSY  05/10/2022    ORTHOPEDIC SURGERY Left     Left knee         Outpatient Encounter Medications as of 1/3/2023   Medication Sig Dispense Refill    ADMELOG SOLOSTAR 100 UNIT/ML SOPN 3-4 units before meals, 2 unit before snacks plus correction 1 per 25 over 125 AC/HS, max daily dose 30 units 15 mL 11    metFORMIN (GLUCOPHAGE-XR) 500 MG extended release tablet Take 1 tablet by mouth daily (before dinner) 30 tablet 5    blood glucose test strips (ACCU-CHEK GUIDE) strip Check glucose and calibrate CGM up to twice daily E11.65 150 strip 3    B-D UF III MINI PEN NEEDLES 31G X 5 MM MISC Use with insulin up to 5 time daily E11.65 150 each 11    Insulin Glargine-yfgn 100 UNIT/ML SOPN 18 in am, 24 in pm 15 mL 11    ondansetron (ZOFRAN ODT) 8 MG TBDP disintegrating tablet Place 1 tablet under the tongue every 6 hours as needed for Nausea or Vomiting 30 tablet 2    aspirin EC 81 MG EC tablet Take 2 tablets by mouth daily 90 tablet 4    Continuous Blood Gluc Sensor (DEXCOM G6 SENSOR) MISC Change every 10 days as directed. Dx E11.9 9 each 3    Continuous Blood Gluc Transmit (DEXCOM G6 TRANSMITTER) MISC Use to monitor blood glucose, E11.65 1 each 3    Continuous Blood Gluc Transmit (DEXCOM G6 TRANSMITTER) MISC Use to monitor blood glucose, E11.65 1 each 3    Accu-Chek Softclix Lancets MISC Use 4x daily to test BG. 150 each 5    Prenatal Vit-Fe Fumarate-FA (PRENATAL VITAMIN PO) Take by mouth       No facility-administered encounter medications on file as of 1/3/2023.          Allergies   Allergen Reactions    Sulfa Antibiotics Rash         Family History   Problem Relation Age of Onset    Diabetes Father     No Known Problems Sister     Breast Cancer Paternal Grandmother     Cancer Paternal Grandmother     Diabetes Paternal Grandfather     Kidney Disease Paternal Grandfather     Colon Cancer Neg Hx     Ovarian Cancer Neg Hx          Social History     Socioeconomic History    Marital status: Single     Spouse name: None    Number of children: None    Years of education: None    Highest education level: None   Tobacco Use    Smoking status: Never    Smokeless tobacco: Never   Vaping Use    Vaping Use: Never used   Substance and Sexual Activity    Alcohol use: No    Drug use: No    Sexual activity: Yes     Partners: Male     Birth control/protection: None     Social Determinants of Health     Physical Activity: Inactive    Days of Exercise per Week: 0 days    Minutes of Exercise per Session: 0 min   Intimate Partner Violence: Not At Risk    Fear of Current or Ex-Partner: No    Emotionally Abused: No    Physically Abused: No    Sexually Abused: No           ROS:  Review of Systems   Constitutional: Negative for chills, fever and weight loss. HENT: Negative for hearing loss. Eyes: Negative for blurred vision and double vision. Respiratory: Negative for cough, hemoptysis and shortness of breath. Cardiovascular: Negative for chest pain, palpitations and orthopnea. Gastrointestinal: Negative for abdominal pain, blood in stool, constipation, diarrhea, nausea and vomiting. Genitourinary: Negative for dysuria, frequency, hematuria and urgency. Musculoskeletal: Negative for falls, joint pain and myalgias. Skin: Negative for itching and rash. Neurological: Negative for headaches. Endo/Heme/Allergies: Does not bruise/bleed easily. Psychiatric/Behavioral: Negative for depression and suicidal ideas. The patient is not nervous/anxious. All other systems reviewed and are negative. PHYSICAL EXAM:  Blood pressure 110/72, weight 202 lb (91.6 kg), last menstrual period 09/20/2022, not currently breastfeeding. Constitutional: She appears well-developed and well-nourished. No distress. HENT:    Head: Normocephalic and atraumatic. Cardiovascular: Normal rate, regular rhythm and normal heart sounds. Exam reveals no gallop and no friction rub. No murmur heard. Pulmonary/Chest: Effort normal and breath sounds normal. No respiratory distress. She has no wheezes. She has no rales. Abdominal: Soft. Bowel sounds are normal. There is no tenderness. There is no rebound and no guarding. Gravid. Skin: She is not diaphoretic. Psychiatric: She has a normal mood and affect.  Her behavior is normal. Thought content normal. .    Pelvic:   External genitalia wnl, no lesions, rashes  Clitoris and urethra midline  Vagina pink, moist, well rugated  Cervix without lesion/masses, DC wnl  Pap performed today     Breasts:   Symmetric, no lesions, masses, rashes, no abnl nipple Dc         ASSESSMENT/PLAN:   28 y.o., [id number], for New OB exam at 15w0d :     1) New OB:   - Cotesting today    -PN labs  wnl   -Rx none    -Flu vaccine recommended    -RTO 4wks for SAMMY     Pregnancy with type 2 diabetes mellitus in first trimester   MFM following > on insulin     Encounter for supervision of high-risk pregnancy with history of infertility in first trimester  S/p normal early scan with MFM.  Anatomy scan scheduled with them   Pap and cultures today   On [address] [facility],

## 2023-01-09 LAB
C TRACH RRNA CVX QL NAA+PROBE: NEGATIVE
CYTOLOGIST CVX/VAG CYTO: NORMAL
CYTOLOGY CVX/VAG DOC THIN PREP: NORMAL
HPV APTIMA: NEGATIVE
HPV GENOTYPE REFLEX: NORMAL
Lab: NORMAL
N GONORRHOEA RRNA CVX QL NAA+PROBE: NEGATIVE
PATH REPORT.FINAL DX SPEC: NORMAL
STAT OF ADQ CVX/VAG CYTO-IMP: NORMAL
T VAGINALIS RRNA SPEC QL NAA+PROBE: NEGATIVE

## 2023-01-19 ENCOUNTER — ROUTINE PRENATAL (OUTPATIENT)
Dept: OBGYN CLINIC | Age: 33
End: 2023-01-19
Payer: COMMERCIAL

## 2023-01-19 VITALS — DIASTOLIC BLOOD PRESSURE: 68 MMHG | SYSTOLIC BLOOD PRESSURE: 108 MMHG

## 2023-01-19 DIAGNOSIS — Z87.442 HISTORY OF KIDNEY STONES: ICD-10-CM

## 2023-01-19 DIAGNOSIS — O24.112 PREGNANCY WITH TYPE 2 DIABETES MELLITUS IN SECOND TRIMESTER: ICD-10-CM

## 2023-01-19 DIAGNOSIS — Z3A.17 17 WEEKS GESTATION OF PREGNANCY: Primary | ICD-10-CM

## 2023-01-19 DIAGNOSIS — Z86.32 HISTORY OF GESTATIONAL DIABETES: ICD-10-CM

## 2023-01-19 DIAGNOSIS — E11.65 UNCONTROLLED TYPE 2 DIABETES MELLITUS WITH HYPERGLYCEMIA (HCC): ICD-10-CM

## 2023-01-19 DIAGNOSIS — O34.80 POLYCYSTIC OVARY AFFECTING PREGNANCY, ANTEPARTUM: ICD-10-CM

## 2023-01-19 DIAGNOSIS — O09.02 ENCOUNTER FOR SUPERVISION OF HIGH-RISK PREGNANCY WITH HISTORY OF INFERTILITY IN SECOND TRIMESTER: ICD-10-CM

## 2023-01-19 DIAGNOSIS — Z97.8 USES SELF-APPLIED CONTINUOUS GLUCOSE MONITORING DEVICE: ICD-10-CM

## 2023-01-19 DIAGNOSIS — E28.2 POLYCYSTIC OVARY AFFECTING PREGNANCY, ANTEPARTUM: ICD-10-CM

## 2023-01-19 PROCEDURE — 76825 ECHO EXAM OF FETAL HEART: CPT | Performed by: OBSTETRICS & GYNECOLOGY

## 2023-01-19 PROCEDURE — 76811 OB US DETAILED SNGL FETUS: CPT | Performed by: OBSTETRICS & GYNECOLOGY

## 2023-01-19 PROCEDURE — 96127 BRIEF EMOTIONAL/BEHAV ASSMT: CPT | Performed by: OBSTETRICS & GYNECOLOGY

## 2023-01-19 PROCEDURE — 99215 OFFICE O/P EST HI 40 MIN: CPT | Performed by: OBSTETRICS & GYNECOLOGY

## 2023-01-19 PROCEDURE — 93325 DOPPLER ECHO COLOR FLOW MAPG: CPT | Performed by: OBSTETRICS & GYNECOLOGY

## 2023-01-19 ASSESSMENT — PATIENT HEALTH QUESTIONNAIRE - PHQ9
SUM OF ALL RESPONSES TO PHQ9 QUESTIONS 1 & 2: 0
2. FEELING DOWN, DEPRESSED OR HOPELESS: 0
SUM OF ALL RESPONSES TO PHQ QUESTIONS 1-9: 0
1. LITTLE INTEREST OR PLEASURE IN DOING THINGS: 0

## 2023-01-19 NOTE — PROGRESS NOTES
M Follow-up Visit    Darcy Patterson (: 1990) is a 28 y.o. Mariana Short at 12w2d with 2023, by Last Menstrual Period. Presents for evaluation of the following chief complaint(s):  High Risk Pregnancy (DM2, H/O Infertility, BMI>30)       Pt was seen previously in 2022 for [facility] for DM2. This pregnancy was assisted with Femara. Patient is [facility] AT [facility]. Scheduled to see primary OB [facility]) on 2023. DM2: See Problem List. Good control. No HAs, edema. Denies any preeclamptic symptoms. Has not felt any fetal movements yet. Nausea has greatly improved, has not vomited since Dec 31st. No bleeding, LOF, cramping, ctxs, or vaginal pressure. Interval history since prior appt reviewed and updated as indicated. Review of Systems - per HPI; otherwise unremarkable. Exam:     Vitals:    23 1017   BP: 108/68       Recent Mood: well    Recent Labs reviewed and addressed. Ultrasound: Please see formal ultrasound report under imaging tab. ASSESSMENT/PLAN:  Patient Active Problem List    Diagnosis Date Noted    Uses self-applied continuous glucose monitoring device 2022     Priority: High     Overview Note:     2022:    Glucose data  Dexcom CGM download reviewed    Dexcom CGM download Dec 14, 22 through Dec 27, 22  Average sensor glucose 116 mg/dL, standard deviation 27, GMI  (N/A for 7 day review)  Time in range 69%, high 30%, very high 0%, low <1% and very low less than 0%    Pattern interpretation  Overall good glucose control time in range at 69%, near target of 70% in range; Doing well over for pregnancy. Glucose data  Dexcom CGM download    Dexcom CGM download  through   Average sensor glucose 110 mg/dL, standard deviation 25,  (N/A for 7 day review)  Time in range 71%, high 26%, very high 0%, low 2% and very low less than 1%    Pattern interpretation  Overall good glucose control with some mild elevations, will adjust MDI dosing.     See DM2 Overview for Insulin dosing. Encounter for supervision of high-risk pregnancy with history of infertility in second trimester      Priority: High     Overview Note:     Hx IMB, PCOS, infertility:   Conceived this pregnancy on Femara 5mg once hgb A1C much improved    12/19/22 [facility]: Normal NT and nasal bone. Genetic counseling done by MD.  Low risk NIPT in OB office. 01/19/23 [facility]: Normal early anatomy with limitations of gestational age. AC 25%, DAVID WNL. Return to [facility] in 4 weeks for anatomy/echo/DM2. See DM2 Overview for log review and recommendations. DM managed remotely by CDE with MFM. Pregnancy with type 2 diabetes mellitus in second trimester      Priority: High     Overview Note:     T2DM, BMI 33:   now seeing Endocrine who is managing       Currently on insulin and Metformin--see meds     Hemoglobin A1c significantly elevated at 8.5 (4/13/22)    hgb A1C 6.4 (6/22/22)  Hgb A1C 5.8 (November)     Just got a Dex com  S/p MFM preconception counseling (6/9/22)--see note  Has been counseled on OB ideal parameters   Repeat hgb A1C today __     ASA 162mg      Mfm referral    Baseline HELLP labs__   Baseline P:C ratio __     12/19/22 [facility]: Pt is using Dexcom; improved A1c at beginning of pregnancy was 5.7%. Currently on Insulin Glargine yfgn 18/24, Admelog 3 units with meals; 4 units with carb-heavy meals; 2 units with snacks. Dexcom report reviewed provided by pt; Avg 116; SD 24 mg/dL. Continue current dosing. Invite sent to pt for [facility] sync. 12/27/2022: Base on Dexcom overview, continue current dosing; Insulin Glargine yfgn 18/24, Admelog 3 units with meals; 4 units with carb-heavy meals; 2 units with snacks. 1/13/2023: Based on Dexcom overview, with some elevations, adjust dosing: Insulin Glargine YFGN 22/28, Admelog 5 units with meals; 6 units with carb-heavy meals; 3 units with snacks.   01/19/23 [facility]: Based on Dexcom review, adjust dosing: Insulin Glargine YFGN 24/32, Admelog 6 units with meals; 7 units with carb-heavy meals; 3 units with snacks. Metformin 500 mg daily. COVID-19 vaccine series declined 11/27/2022     Priority: Medium    Uncontrolled type 2 diabetes mellitus with hyperglycemia ([facility] [address] 11/03/2022     Priority: Medium     Overview Note:     hgb 8.5 (4/13/22)  Hgb A1C continues to improve at 5.7 (11/22/22)       History of kidney stones 06/09/2022     Priority: Medium     Overview Note:     6/9/2022 [facility]: flomax tolerated; s/p lithotripsy, still with residual pieces and small stones. 12/19/22 [facility]: No recent issues with kidney stones. Polycystic ovary affecting pregnancy, antepartum      Priority: Low    History of gestational diabetes 11/30/2016     Priority: Low     Overview Note:     Pt had in 2017 pregnancy with polyhydramnios, insulin controlled. Mood evaluated today; PHQ2 reviewed. Counseling re possibility of peripartum worsening. Mood Reassuring today     Addressed normal pregnancy complaints, reassured and offered suggestions for care  Reviewed gestational age precautions and activity goals/limitations  Nutritional counseling as well as specific goals based on current maternal and fetal status  Options for GERD therapy if becomes symptomatic over course of pregnancy  Gestational age appropriate preventive care regarding communicable disease transmission and vaccines as appropriate (including flu, TDaP, and COVID.)  Additional plans and concerns as documented in problem list.   All questions answered and concerns discussed. An electronic signature was used to authenticate this note. Della Whitaker MD    I have spent 40 minutes reviewing previous notes, test results and face to face with the patient discussing the diagnosis and importance of compliance with the treatment plan, in addition to ultrasound findings, as well as documenting on the day of the visit (01/19/2023). Return in 4 weeks (on 2/16/2023) for Anatomy & Echo, DM2 F/U.     Patient Active Problem List   Diagnosis Code    History of gestational diabetes Z86.32    Pregnancy with type 2 diabetes mellitus in second trimester O24.112    Polycystic ovary affecting pregnancy, antepartum O34.80, E28.2    Encounter for supervision of high-risk pregnancy with history of infertility in second trimester O09.02    History of kidney stones Z87.442    Uncontrolled type 2 diabetes mellitus with hyperglycemia ([facility] [address] E11.65    COVID-19 vaccine series declined Z28.21, Z28.310    Uses self-applied continuous glucose monitoring device Z97.8     Visit Diagnoses         Codes    17 weeks gestation of pregnancy    -  Primary Z3A.17    BMI 32.0-32.9,adult     Z68.32

## 2023-01-31 ENCOUNTER — TELEPHONE (OUTPATIENT)
Dept: FAMILY MEDICINE CLINIC | Facility: CLINIC | Age: 33
End: 2023-01-31

## 2023-01-31 NOTE — TELEPHONE ENCOUNTER
----- Message from Neelam Damian MD sent at 1/31/2023  1:26 PM EST -----  Regarding: FW: Strap  Please have her call her OB or make an appt to evaluate.  ----- Message -----  From: Lisa Esquivel MA  Sent: 1/31/2023   8:25 AM EST  To: Neelam Damian MD  Subject: Baltazar Gutierrez                                          ----- Message -----  From: Irina Farias  Sent: 1/30/2023   8:30 PM EST  To: , #  Subject: Strap                                            My son tested positive for Strap on Saturday. Now I have it because I'm running a fever and my  Throat is hurting. I'm 19 weeks pregnant. What can I take for strap or can you send any medicine for it.

## 2023-02-01 ENCOUNTER — OFFICE VISIT (OUTPATIENT)
Dept: FAMILY MEDICINE CLINIC | Facility: CLINIC | Age: 33
End: 2023-02-01
Payer: COMMERCIAL

## 2023-02-01 VITALS
BODY MASS INDEX: 31.69 KG/M2 | SYSTOLIC BLOOD PRESSURE: 102 MMHG | HEART RATE: 86 BPM | WEIGHT: 197.2 LBS | RESPIRATION RATE: 18 BRPM | DIASTOLIC BLOOD PRESSURE: 60 MMHG | HEIGHT: 66 IN | OXYGEN SATURATION: 97 % | TEMPERATURE: 97.7 F

## 2023-02-01 DIAGNOSIS — J02.0 ACUTE STREPTOCOCCAL PHARYNGITIS: Primary | ICD-10-CM

## 2023-02-01 DIAGNOSIS — J02.9 SORE THROAT: ICD-10-CM

## 2023-02-01 LAB
GROUP A STREP ANTIGEN, POC: POSITIVE
VALID INTERNAL CONTROL, POC: YES

## 2023-02-01 PROCEDURE — 99214 OFFICE O/P EST MOD 30 MIN: CPT | Performed by: NURSE PRACTITIONER

## 2023-02-01 PROCEDURE — 87880 STREP A ASSAY W/OPTIC: CPT | Performed by: NURSE PRACTITIONER

## 2023-02-01 RX ORDER — AMOXICILLIN 875 MG/1
875 TABLET, COATED ORAL 2 TIMES DAILY
Qty: 20 TABLET | Refills: 0 | Status: SHIPPED | OUTPATIENT
Start: 2023-02-01 | End: 2023-02-11

## 2023-02-01 SDOH — ECONOMIC STABILITY: INCOME INSECURITY: HOW HARD IS IT FOR YOU TO PAY FOR THE VERY BASICS LIKE FOOD, HOUSING, MEDICAL CARE, AND HEATING?: NOT HARD AT ALL

## 2023-02-01 SDOH — ECONOMIC STABILITY: FOOD INSECURITY: WITHIN THE PAST 12 MONTHS, THE FOOD YOU BOUGHT JUST DIDN'T LAST AND YOU DIDN'T HAVE MONEY TO GET MORE.: NEVER TRUE

## 2023-02-01 SDOH — ECONOMIC STABILITY: HOUSING INSECURITY
IN THE LAST 12 MONTHS, WAS THERE A TIME WHEN YOU DID NOT HAVE A STEADY PLACE TO SLEEP OR SLEPT IN A SHELTER (INCLUDING NOW)?: NO

## 2023-02-01 SDOH — ECONOMIC STABILITY: FOOD INSECURITY: WITHIN THE PAST 12 MONTHS, YOU WORRIED THAT YOUR FOOD WOULD RUN OUT BEFORE YOU GOT MONEY TO BUY MORE.: NEVER TRUE

## 2023-02-01 ASSESSMENT — ENCOUNTER SYMPTOMS
NAUSEA: 1
COUGH: 0
CONSTIPATION: 0
SORE THROAT: 1
WHEEZING: 0
DIARRHEA: 0
VOMITING: 1
SINUS PAIN: 0
ABDOMINAL PAIN: 0
EYE PAIN: 0
BACK PAIN: 0
SHORTNESS OF BREATH: 0

## 2023-02-01 NOTE — PROGRESS NOTES
Earle Soler (:  1990) is a 28 y.o. female,Established patient, here for evaluation of the following chief complaint(s):  Pharyngitis (Started: Monday night//Symptoms: running fever)         ASSESSMENT/PLAN:  1. Acute streptococcal pharyngitis  -     amoxicillin (AMOXIL) 875 MG tablet; Take 1 tablet by mouth 2 times daily for 10 days, Disp-20 tablet, R-0Normal  2. Sore throat  -     AMB POC RAPID STREP A    Sore throat  New onset. Rapid Strep was positive. Patient presents afebrile, ill-appearing, non-toxic and well-hydrated. Will treat w/ Amoxicillin x 10 days. Counseled on side effects, avoid alcohol and take probiotic.    - Recommended supportive care, rest, good hygiene and encouraged to take adequate fluids. Handout for symptomatic management provided: humidifier, gargles, cool liquids and lozenges. - Recommended taking Tylenol for fever/pain. - Worrisome s/sx and indication for RTC discussed. - F/u if no improvement or worsening symptoms in the next 2-3 days. Subjective   SUBJECTIVE/OBJECTIVE:  Son tested positive for strep a few days ago. She is now having nausea, vomiting, sore throat and fever. She is 19 weeks pregnant. Pharyngitis  This is a new problem. The current episode started in the past 7 days. The problem occurs constantly. The problem has been gradually worsening. Associated symptoms include a fever, nausea, a sore throat and vomiting. Pertinent negatives include no abdominal pain, congestion, coughing, fatigue, headaches, joint swelling, rash or weakness. She has tried acetaminophen for the symptoms. The treatment provided mild relief. Review of Systems   Constitutional:  Positive for fever. Negative for appetite change, fatigue and unexpected weight change. HENT:  Positive for sore throat. Negative for congestion, ear pain, postnasal drip and sinus pain. Eyes:  Negative for pain.    Respiratory:  Negative for cough, shortness of breath and wheezing. Cardiovascular:  Negative for palpitations and leg swelling. Gastrointestinal:  Positive for nausea and vomiting. Negative for abdominal pain, constipation and diarrhea. Genitourinary:  Negative for dysuria, frequency and urgency. Musculoskeletal:  Negative for back pain and joint swelling. Skin:  Negative for rash and wound. Neurological:  Negative for dizziness, weakness and headaches. Hematological:  Does not bruise/bleed easily. Objective   Physical Exam  Vitals reviewed. Constitutional:       Appearance: Normal appearance. HENT:      Head: Normocephalic and atraumatic. Mouth/Throat:      Mouth: Mucous membranes are moist.      Pharynx: Posterior oropharyngeal erythema present. No oropharyngeal exudate. Tonsils: No tonsillar exudate. Eyes:      Extraocular Movements: Extraocular movements intact. Pupils: Pupils are equal, round, and reactive to light. Cardiovascular:      Rate and Rhythm: Normal rate and regular rhythm. Heart sounds: Normal heart sounds. Pulmonary:      Effort: Pulmonary effort is normal.      Breath sounds: Normal breath sounds. Abdominal:      General: Abdomen is flat. Skin:     General: Skin is warm and dry. Neurological:      General: No focal deficit present. Mental Status: She is alert and oriented to person, place, and time. Results for orders placed or performed in visit on 02/01/23   AMB POC RAPID STREP A   Result Value Ref Range    Valid Internal Control, POC YES     Group A Strep Antigen, POC Positive Negative         An electronic signature was used to authenticate this note.     --BECKY Vasquez - CNP

## 2023-02-02 ENCOUNTER — ROUTINE PRENATAL (OUTPATIENT)
Dept: OBGYN CLINIC | Age: 33
End: 2023-02-02
Payer: COMMERCIAL

## 2023-02-02 VITALS — BODY MASS INDEX: 32.6 KG/M2 | SYSTOLIC BLOOD PRESSURE: 109 MMHG | DIASTOLIC BLOOD PRESSURE: 72 MMHG | WEIGHT: 202 LBS

## 2023-02-02 DIAGNOSIS — Z98.891 HISTORY OF CESAREAN DELIVERY: ICD-10-CM

## 2023-02-02 DIAGNOSIS — Z3A.19 19 WEEKS GESTATION OF PREGNANCY: ICD-10-CM

## 2023-02-02 DIAGNOSIS — E66.9 OBESITY (BMI 30-39.9): ICD-10-CM

## 2023-02-02 DIAGNOSIS — O34.80 POLYCYSTIC OVARY AFFECTING PREGNANCY, ANTEPARTUM: ICD-10-CM

## 2023-02-02 DIAGNOSIS — E28.2 POLYCYSTIC OVARY AFFECTING PREGNANCY, ANTEPARTUM: ICD-10-CM

## 2023-02-02 DIAGNOSIS — O09.02 ENCOUNTER FOR SUPERVISION OF HIGH-RISK PREGNANCY WITH HISTORY OF INFERTILITY IN SECOND TRIMESTER: Primary | ICD-10-CM

## 2023-02-02 DIAGNOSIS — O24.112 PREGNANCY WITH TYPE 2 DIABETES MELLITUS IN SECOND TRIMESTER: ICD-10-CM

## 2023-02-02 PROCEDURE — 99213 OFFICE O/P EST LOW 20 MIN: CPT | Performed by: OBSTETRICS & GYNECOLOGY

## 2023-02-02 NOTE — PROGRESS NOTES
Isaiah Catarina 19w0d   Denies LOF, VB, Ctxs. no FM yet      Neg Cotesting and STD testing   Genetics:  NIPT low risk female, Carrier screening neg      OB hx:  G1  2009 at 41w0d (7#)  G2 primary LTCS at 39wks on 17 w/ Dr Shiv Garcia due to FTP w/ NRFHTs; preg complicated by C4DX (insulin) w/ Polyhydramnios (7#15)     No issues conceiving 1st 2 pregnancies (prior to T2DM diagnosis)  Single  Current partner is FOB of G2                 T2DM, BMI 33:   now seeing Endocrine who is managing    Currently on insulin and Metformin--see meds     Hemoglobin A1c significantly elevated at 8.5 (22)    hgb A1C 6.4 (22)  Hgb A1C 5.8 (November)     Dexcom  S/p MFM preconception counseling (22)--see note  Has been counseled on OB ideal parameters   hgb A1C 5.7 at preg intake (22)  ASA 162mg      Baseline HELLP labs wnl  Baseline P:C ratio 0.03      23 [facility]: Normal early anatomy with limitations of gestational age. AC 25%, DAVID WNL. Return to [facility] in 4 weeks for anatomy/echo/DM2. See DM2 Overview for log review and recommendations. DM managed remotely by CDE with MFM.    23 [facility]: Based on Dexcom review, adjust dosing: Insulin Glargine YFGN , Admelog 6 units with meals; 7 units with carb-heavy meals; 3 units with snacks. Metformin 500 mg daily.     Will need 2x/wk testing at 32wks           Hx LTCS x1:  Desires repeat c/s            Hx IMB, PCOS, infertility:   Conceived this pregnancy on Femara 5mg once hgb A1C much improved

## 2023-02-20 ENCOUNTER — ROUTINE PRENATAL (OUTPATIENT)
Dept: OBGYN CLINIC | Age: 33
End: 2023-02-20
Payer: COMMERCIAL

## 2023-02-20 VITALS — DIASTOLIC BLOOD PRESSURE: 80 MMHG | SYSTOLIC BLOOD PRESSURE: 110 MMHG

## 2023-02-20 DIAGNOSIS — O09.02 ENCOUNTER FOR SUPERVISION OF HIGH-RISK PREGNANCY WITH HISTORY OF INFERTILITY IN SECOND TRIMESTER: ICD-10-CM

## 2023-02-20 DIAGNOSIS — Z97.8 USES SELF-APPLIED CONTINUOUS GLUCOSE MONITORING DEVICE: ICD-10-CM

## 2023-02-20 DIAGNOSIS — O99.212 OBESITY AFFECTING PREGNANCY IN SECOND TRIMESTER: ICD-10-CM

## 2023-02-20 DIAGNOSIS — E11.9 CONTROLLED TYPE 2 DIABETES MELLITUS WITHOUT COMPLICATION, WITHOUT LONG-TERM CURRENT USE OF INSULIN (HCC): Primary | ICD-10-CM

## 2023-02-20 DIAGNOSIS — Z3A.21 21 WEEKS GESTATION OF PREGNANCY: ICD-10-CM

## 2023-02-20 DIAGNOSIS — E28.2 POLYCYSTIC OVARY AFFECTING PREGNANCY, ANTEPARTUM: ICD-10-CM

## 2023-02-20 DIAGNOSIS — O34.80 POLYCYSTIC OVARY AFFECTING PREGNANCY, ANTEPARTUM: ICD-10-CM

## 2023-02-20 DIAGNOSIS — O24.112 PREGNANCY WITH TYPE 2 DIABETES MELLITUS IN SECOND TRIMESTER: ICD-10-CM

## 2023-02-20 PROCEDURE — 76816 OB US FOLLOW-UP PER FETUS: CPT | Performed by: OBSTETRICS & GYNECOLOGY

## 2023-02-20 PROCEDURE — 99215 OFFICE O/P EST HI 40 MIN: CPT | Performed by: OBSTETRICS & GYNECOLOGY

## 2023-02-20 ASSESSMENT — PATIENT HEALTH QUESTIONNAIRE - PHQ9
SUM OF ALL RESPONSES TO PHQ QUESTIONS 1-9: 0
SUM OF ALL RESPONSES TO PHQ QUESTIONS 1-9: 0
2. FEELING DOWN, DEPRESSED OR HOPELESS: 0
SUM OF ALL RESPONSES TO PHQ9 QUESTIONS 1 & 2: 0
SUM OF ALL RESPONSES TO PHQ QUESTIONS 1-9: 0
SUM OF ALL RESPONSES TO PHQ QUESTIONS 1-9: 0
1. LITTLE INTEREST OR PLEASURE IN DOING THINGS: 0

## 2023-02-20 NOTE — ASSESSMENT & PLAN NOTE
Based on current Dexcom setting, pt to continue dosing: Insulin Glargine YFGN 24/32; Admelog 7 units with meals; 3 units with snacks; not doing Metformin  mg daily.

## 2023-02-20 NOTE — PROGRESS NOTES
MFM Follow-up Visit    Aimee Diop (: 1990) is a 28 y.o. Dona Mueller at [address] [place] [address] with 2023, by Last Menstrual Period. Presents for evaluation of the following chief complaint(s):  High Risk Pregnancy (DM2, H/O Infertility, BMI>30) and Pregnancy Ultrasound (F/u viky)       Pt was seen previously in 2022 for [facility] for DM2. This pregnancy was assisted with Femara. Patient is [facility] AT [facility]. Scheduled to see primary OB [facility]) on 3/2/2023. DM2: See Problem List. Good control on Dexcom G6 CGM and MDI. No HAs, edema. Denies preeclamptic symptoms. Reports good fetal movement. No bleeding, LOF, cramping, ctxs, or vaginal pressure. Interval history since prior appt reviewed and updated as indicated. Review of Systems - per HPI; otherwise unremarkable. Exam:     Vitals:    23 1043   BP: 110/80       Recent Mood: well    Recent Labs reviewed and addressed. Ultrasound: Please see formal ultrasound report under imaging tab. ASSESSMENT/PLAN:  Patient Active Problem List    Diagnosis Date Noted    Uses self-applied continuous glucose monitoring device 2022     Priority: High     Overview Note:     2022:    Glucose data  Dexcom CGM download reviewed    Dexcom CGM download Dec 14, 22 through Dec 27, 22  Average sensor glucose 116 mg/dL, standard deviation 27, GMI  (N/A for 7 day review)  Time in range 69%, high 30%, very high 0%, low <1% and very low less than 0%    Pattern interpretation  Overall good glucose control time in range at 69%, near target of 70% in range; Doing well over for pregnancy. Glucose data  Dexcom CGM download    Dexcom CGM download  through   Average sensor glucose 110 mg/dL, standard deviation 25,  (N/A for 7 day review)  Time in range 71%, high 26%, very high 0%, low 2% and very low less than 1%    Pattern interpretation  Overall good glucose control with some mild elevations, will adjust MDI dosing.     See DM2 Overview for Insulin dosing. Assessment & Plan Note:     Glucose data  Dexcom G6 CGM download with insulin pump data    Dexcom CGM download  Mon Feb 6, 2023 - Sun Feb 19, 2023  Average sensor glucose 102 mg/dL, standard deviation 19 mg/dL, GMI: (N/A 10/14 day use)   Time in range 85%, high 13%, very high 0%, low 1% and very low less than 1%    Target Range  Day (6:00 AM - 10:00 PM):  mg/dL  Night (10:00 PM - 6:00 AM):  mg/dL    Pattern interpretation  Overall good glucose control with minimal elevations. See DM2 Overview for Insulin dosing. Encounter for supervision of high-risk pregnancy with history of infertility in second trimester      Priority: High     Overview Note:     Hx IMB, PCOS, infertility:   Conceived this pregnancy on Femara 5mg once hgb A1C much improved    12/19/22 [facility]: Normal NT and nasal bone. Genetic counseling done by MD.  Low risk NIPT in OB office. 01/19/23 [facility]: Normal early anatomy with limitations of gestational age. AC 25%, DAVID WNL. 02/20/23 [facility]: Normal anatomy/echo; AC 55%, DAVID 19.0 cm. Return to [facility] in 4 weeks for repeat anatomy/echo; DM2. See DM2 Overview for Dexcom interpretation and recommendations. DM managed remotely by CDE with MFM. Pregnancy with type 2 diabetes mellitus in second trimester      Priority: High     Overview Note:     T2DM, BMI 33:   now seeing Endocrine who is managing       Currently on insulin and Metformin--see meds     Hemoglobin A1c significantly elevated at 8.5 (4/13/22)    hgb A1C 6.4 (6/22/22)  Hgb A1C 5.8 (November)     Just got a Dex com  S/p MFM preconception counseling (6/9/22)--see note  Has been counseled on OB ideal parameters   Repeat hgb A1C today __     ASA 162mg      Mfm referral    Baseline HELLP labs__   Baseline P:C ratio __     12/19/22 [facility]: Pt is using Dexcom; improved A1c at beginning of pregnancy was 5.7%.  Currently on Insulin Glargine yfgn 18/24, Admelog 3 units with meals; 4 units with carb-heavy meals; 2 units with snacks. Dexcom report reviewed provided by pt; Avg 116; SD 24 mg/dL. Continue current dosing. Invite sent to pt for [facility] sync. 12/27/2022: Base on Dexcom overview, continue current dosing; Insulin Glargine yfgn 18/24, Admelog 3 units with meals; 4 units with carb-heavy meals; 2 units with snacks. 1/13/2023: Based on Dexcom overview, with some elevations, adjust dosing: Insulin Glargine YFGN 22/28, Admelog 5 units with meals; 6 units with carb-heavy meals; 3 units with snacks. 01/19/23 [facility]: Based on Dexcom review, adjust dosing: Insulin Glargine YFGN 24/32, Admelog 6 units with meals; 7 units with carb-heavy meals; 3 units with snacks. Metformin 500 mg daily. Will need 2x/wk testing at [address] [place] [address] Note:     Based on current Dexcom setting, pt to continue dosing: Insulin Glargine YFGN 24/32; Admelog 7 units with meals; 3 units with snacks; not doing Metformin  mg daily. COVID-19 vaccine series declined 11/27/2022     Priority: Medium    Controlled type 2 diabetes mellitus without complication, without long-term current use of insulin ([facility] [address] 11/03/2022     Priority: Medium     Overview Note:     hgb 8.5 (4/13/22)  Hgb A1C continues to improve at 5.7 (11/22/22)       History of kidney stones 06/09/2022     Priority: Medium     Overview Note:     6/9/2022 [facility]: flomax tolerated; s/p lithotripsy, still with residual pieces and small stones. 12/19/22 [facility]: No recent issues with kidney stones. Obesity affecting pregnancy in second trimester 10/10/2016     Priority: Medium     Overview Note:     Last Assessment & Plan:   Formatting of this note is different from the original.   Discussed with patient that having obesity increases a person's risk of developing many health problems.    Diabetes, High blood pressure, High cholesterol, Heart disease (including heart attacks), Stroke, Sleep apnea (a disorder in which you stop breathing for short periods while asleep), Asthma, Cancer  But even if weight loss is not possible, may reduce risk by:  Become more active - Many types of physical activity can help, including walking. You can start with a few minutes a day and add more as you get stronger and build up your endurance. Improve your diet - It is healthy to have regular meal times, eat smaller portions, and not skip meals. Avoid sweets and processed foods, and instead eat more vegetables and fruits. History of  delivery 2023     Priority: Low     Overview Note:     Hx LTCS x1:  Desires repeat c/s       Polycystic ovary affecting pregnancy, antepartum      Priority: Low    History of gestational diabetes 2016     Priority: Low     Overview Note:     Pt had in 2017 pregnancy with polyhydramnios, insulin controlled. Mood evaluated today; PHQ2 reviewed. Counseling re possibility of peripartum worsening. Mood Reassuring today     Addressed normal pregnancy complaints, reassured and offered suggestions for care  Reviewed gestational age precautions and activity goals/limitations  Nutritional counseling as well as specific goals based on current maternal and fetal status  Options for GERD therapy if becomes symptomatic over course of pregnancy  Gestational age appropriate preventive care regarding communicable disease transmission and vaccines as appropriate (including flu, TDaP, and COVID.)  Additional plans and concerns as documented in problem list.   All questions answered and concerns discussed. An electronic signature was used to authenticate this note. Sherin Wells MD    I have spent 40 minutes reviewing previous notes, test results and face to face with the patient discussing the diagnosis and importance of compliance with the treatment plan, in addition to ultrasound findings, as well as documenting on the day of the visit (2023). Return in about 4 weeks (around 3/20/2023) for Growth, DM2 F/U.     Patient Active Problem List   Diagnosis Code    History of gestational diabetes Z86.32    Pregnancy with type 2 diabetes mellitus in second trimester O24.112    Polycystic ovary affecting pregnancy, antepartum O34.80, E28.2    Encounter for supervision of high-risk pregnancy with history of infertility in second trimester O09.02    History of kidney stones Z87.442    Controlled type 2 diabetes mellitus without complication, without long-term current use of insulin (HCC) E11.9    COVID-19 vaccine series declined Z28.21, Z28.310    Uses self-applied continuous glucose monitoring device Z97.8    Obesity affecting pregnancy in second trimester O99.212    History of  delivery Z98.891     Visit Diagnoses         Codes    21 weeks gestation of pregnancy     Z3A.21    BMI 32.0-32.9,adult     Z68.32

## 2023-02-20 NOTE — ASSESSMENT & PLAN NOTE
Glucose data  Dexcom G6 CGM download with insulin pump data    Dexcom CGM download  Mon Feb 6, 2023 - Sun Feb 19, 2023  Average sensor glucose 102 mg/dL, standard deviation 19 mg/dL, GMI: (N/A 10/14 day use)   Time in range 85%, high 13%, very high 0%, low 1% and very low less than 1%    Target Range  Day (6:00 AM - 10:00 PM):  mg/dL  Night (10:00 PM - 6:00 AM):  mg/dL    Pattern interpretation  Overall good glucose control with minimal elevations. See DM2 Overview for Insulin dosing.

## 2023-03-02 ENCOUNTER — ROUTINE PRENATAL (OUTPATIENT)
Dept: OBGYN CLINIC | Age: 33
End: 2023-03-02

## 2023-03-02 VITALS — SYSTOLIC BLOOD PRESSURE: 119 MMHG | DIASTOLIC BLOOD PRESSURE: 68 MMHG | WEIGHT: 204 LBS | BODY MASS INDEX: 32.93 KG/M2

## 2023-03-02 DIAGNOSIS — O09.02 ENCOUNTER FOR SUPERVISION OF HIGH-RISK PREGNANCY WITH HISTORY OF INFERTILITY IN SECOND TRIMESTER: Primary | ICD-10-CM

## 2023-03-02 DIAGNOSIS — O99.212 OBESITY AFFECTING PREGNANCY IN SECOND TRIMESTER: ICD-10-CM

## 2023-03-02 DIAGNOSIS — Z98.891 HISTORY OF CESAREAN DELIVERY: ICD-10-CM

## 2023-03-02 DIAGNOSIS — O34.80 POLYCYSTIC OVARY AFFECTING PREGNANCY, ANTEPARTUM: ICD-10-CM

## 2023-03-02 DIAGNOSIS — O24.112 PREGNANCY WITH TYPE 2 DIABETES MELLITUS IN SECOND TRIMESTER: ICD-10-CM

## 2023-03-02 DIAGNOSIS — E28.2 POLYCYSTIC OVARY AFFECTING PREGNANCY, ANTEPARTUM: ICD-10-CM

## 2023-03-02 NOTE — PROGRESS NOTES
Kenzie Gain 23w2d  Denies LOF, VB, Ctxs. Good FM. Genetics:  NIPT low risk female, Carrier screening neg      OB hx:  G1  2009 at 41w0d (7#)  G2 primary LTCS at 39wks on 17 w/ Dr Palak Shukla due to FTP w/ NRFHTs; preg complicated by C3UC (insulin) w/ Polyhydramnios (7#15)     No issues conceiving 1st 2 pregnancies (prior to T2DM diagnosis)  Single  Current partner is FOB of G2            T2DM, BMI 33:   now seeing Endocrine who is managing    Currently on insulin and Metformin--see meds     Hemoglobin A1c significantly elevated at 8.5 (22)    hgb A1C 6.4 (22)  Hgb A1C 5.8 (November)     Dexcom  S/p MFM preconception counseling (22)--see note  Has been counseled on OB ideal parameters   hgb A1C 5.7 at preg intake (22)  ASA 162mg      Baseline HELLP labs wnl  Baseline P:C ratio 0.03      23 [facility]: Normal anatomy/echo; AC 55%, DAVID 19.0 cm. Return to [facility] in 4 weeks for repeat anatomy/echo; DM2. See DM2 Overview for Dexcom interpretation and recommendations. DM managed remotely by CDE with MFM.       MFM to see again on 3/20/23 ___     Will need 2x/wk testing at 32wks           Hx LTCS x1:  Desires repeat c/s            Hx IMB, PCOS, infertility:   Conceived this pregnancy on Femara 5mg once hgb A1C much improved

## 2023-03-09 ENCOUNTER — TELEPHONE (OUTPATIENT)
Dept: OBGYN CLINIC | Age: 33
End: 2023-03-09

## 2023-03-09 DIAGNOSIS — O24.112 PREGNANCY WITH TYPE 2 DIABETES MELLITUS IN SECOND TRIMESTER: ICD-10-CM

## 2023-03-09 DIAGNOSIS — Z97.8 USES SELF-APPLIED CONTINUOUS GLUCOSE MONITORING DEVICE: ICD-10-CM

## 2023-03-09 NOTE — ASSESSMENT & PLAN NOTE
Based on Dexcom, will make minor adjustments: Insulin Glargine-YFGN 26/36, Admelog 6 units with meals; 7-8 units with heavy-carb meals; 3 units with snacks. Continue Metformin 500 mg daily.

## 2023-03-09 NOTE — ASSESSMENT & PLAN NOTE
Glucose data  Dexcom G6 CGM download reviewed    Dexcom CGM download 2/20/23 through 3/9/23  Average sensor glucose 119 mg/dL, standard deviation 23, GMI 6.2%  Time in range 66%, high 32%, very high 1%, low <1% and very low less than 0%    Pattern interpretation  Overall good glucose control with slight increase in elevations from previous interpretation. Will make adjustment to insulin dosing to optimize glycemic control. See DM2 Overview for Insulin dosing.

## 2023-03-09 NOTE — TELEPHONE ENCOUNTER
CGM reviewed. Contacted pt and advised them to adjust insulin as stated in the DM2 in pregnancy A/P.    30 minutes spend on glucose interpretation and communication with patient by Emily Carpenter.

## 2023-03-20 ENCOUNTER — ROUTINE PRENATAL (OUTPATIENT)
Dept: OBGYN CLINIC | Age: 33
End: 2023-03-20
Payer: COMMERCIAL

## 2023-03-20 VITALS — DIASTOLIC BLOOD PRESSURE: 68 MMHG | SYSTOLIC BLOOD PRESSURE: 98 MMHG

## 2023-03-20 DIAGNOSIS — O09.02 ENCOUNTER FOR SUPERVISION OF HIGH-RISK PREGNANCY WITH HISTORY OF INFERTILITY IN SECOND TRIMESTER: ICD-10-CM

## 2023-03-20 DIAGNOSIS — Z97.8 USES SELF-APPLIED CONTINUOUS GLUCOSE MONITORING DEVICE: ICD-10-CM

## 2023-03-20 DIAGNOSIS — Z86.32 HISTORY OF GESTATIONAL DIABETES: ICD-10-CM

## 2023-03-20 DIAGNOSIS — Z87.442 HISTORY OF KIDNEY STONES: ICD-10-CM

## 2023-03-20 DIAGNOSIS — O24.112 PREGNANCY WITH TYPE 2 DIABETES MELLITUS IN SECOND TRIMESTER: ICD-10-CM

## 2023-03-20 DIAGNOSIS — Z3A.25 25 WEEKS GESTATION OF PREGNANCY: Primary | ICD-10-CM

## 2023-03-20 DIAGNOSIS — E11.9 CONTROLLED TYPE 2 DIABETES MELLITUS WITHOUT COMPLICATION, WITHOUT LONG-TERM CURRENT USE OF INSULIN (HCC): ICD-10-CM

## 2023-03-20 DIAGNOSIS — E28.2 POLYCYSTIC OVARY AFFECTING PREGNANCY, ANTEPARTUM: ICD-10-CM

## 2023-03-20 DIAGNOSIS — O34.80 POLYCYSTIC OVARY AFFECTING PREGNANCY, ANTEPARTUM: ICD-10-CM

## 2023-03-20 DIAGNOSIS — O99.212 OBESITY AFFECTING PREGNANCY IN SECOND TRIMESTER: ICD-10-CM

## 2023-03-20 DIAGNOSIS — Z13.32 ENCOUNTER FOR SCREENING FOR MATERNAL DEPRESSION: ICD-10-CM

## 2023-03-20 DIAGNOSIS — Z98.891 HISTORY OF CESAREAN DELIVERY: ICD-10-CM

## 2023-03-20 PROCEDURE — 96127 BRIEF EMOTIONAL/BEHAV ASSMT: CPT | Performed by: OBSTETRICS & GYNECOLOGY

## 2023-03-20 PROCEDURE — 99213 OFFICE O/P EST LOW 20 MIN: CPT | Performed by: OBSTETRICS & GYNECOLOGY

## 2023-03-20 PROCEDURE — 76816 OB US FOLLOW-UP PER FETUS: CPT | Performed by: OBSTETRICS & GYNECOLOGY

## 2023-03-20 PROCEDURE — 95251 CONT GLUC MNTR ANALYSIS I&R: CPT | Performed by: OBSTETRICS & GYNECOLOGY

## 2023-03-20 ASSESSMENT — PATIENT HEALTH QUESTIONNAIRE - PHQ9
SUM OF ALL RESPONSES TO PHQ QUESTIONS 1-9: 0
SUM OF ALL RESPONSES TO PHQ QUESTIONS 1-9: 0
2. FEELING DOWN, DEPRESSED OR HOPELESS: 0
SUM OF ALL RESPONSES TO PHQ QUESTIONS 1-9: 0
SUM OF ALL RESPONSES TO PHQ9 QUESTIONS 1 & 2: 0
1. LITTLE INTEREST OR PLEASURE IN DOING THINGS: 0
SUM OF ALL RESPONSES TO PHQ QUESTIONS 1-9: 0

## 2023-03-20 NOTE — ASSESSMENT & PLAN NOTE
Glucose data  Dexcom G6 download reviewed    Reporting period: Thu Mar 9, 2023 - Mon Mar 20, 2023  Average glucose: 108 mg/dL  Standard deviation: 22 mg/dL  GMI: N/A    Time in Range 80%, Very High: <1%, High: 19%, Low: <1%, Very Low: 0%    Target Range  Day (6:00 AM - 10:00 PM):  mg/dL  Night (10:00 PM - 6:00 AM):  mg/dL  -----------------------------  CGM Details  Sensor usage: 67%  Days with CGM data: 8/12    Pattern interpretation  Last insulin adjustment shows improvement with in range time of 80%. Most elevations continue from mid-day between 12pm and 4:30pm. Overnight runs a little high with fasting readings in the low 100's. Overall good control in pregnancy.

## 2023-03-20 NOTE — PROGRESS NOTES
MFM Follow-up Visit    Fidencio Atwood (: 1990) is a 35 y.o. Michelle Dumont at 25w6d with 2023, by Last Menstrual Period. Presents for evaluation of the following chief complaint(s):  High Risk Pregnancy (DM2, H/O Infertility, BMI>30)    Pt was seen previously in 2022 for [facility] for DM2. This pregnancy was assisted with Femara. Patient is [facility] AT [facility]. Scheduled to see primary OB [facility]) on 2023. Son present today. DM2: See Problem List. Good control on Dexcom G6 CGM and MDI. No HAs, edema. Denies preeclamptic symptoms. Reports good fetal movement. No bleeding, LOF, cramping, ctxs, or vaginal pressure. Interval history since prior appt reviewed and updated as indicated. Review of Systems - per HPI; otherwise unremarkable. Exam:     Vitals:    23 0952   BP: 98/68     Recent Labs Reviewed. Please see formal ultrasound report under imaging tab. ASSESSMENT/PLAN:  Patient Active Problem List    Diagnosis Date Noted    Pregnancy with type 2 diabetes mellitus in second trimester      Priority: High     Overview Note:     T2DM, BMI 33:   now seeing Endocrine who is managing       Currently on insulin and Metformin--see meds     Hemoglobin A1c significantly elevated at 8.5 (22)    hgb A1C 6.4 (22)  Hgb A1C 5.8 (November)     Just got a Dex com  S/p MFM preconception counseling (22)--see note  Has been counseled on OB ideal parameters   Repeat hgb A1C today __     ASA 162mg      Baseline HELLP labs wnl  Baseline P:C ratio 0.03      ... Will need 2x/wk testing at 32wks       Assessment & Plan Note:     Based on Dexcom, will make minor adjustments: Insulin Glargine-YFGN , Admelog 6 units with meals, 7-8 units with heavy-carb meals, 3 units with snacks. Continue Metformin 500 mg daily.         History of  delivery 2023     Priority: Medium     Overview Note:     Hx LTCS x1:  Desires repeat c/s       Uses self-applied continuous glucose

## 2023-03-20 NOTE — ASSESSMENT & PLAN NOTE
Based on Dexcom, will make minor adjustments: Insulin Glargine-YFGN 26/38, Admelog 6 units with meals, 7-8 units with heavy-carb meals, 3 units with snacks. Continue Metformin 500 mg daily.

## 2023-04-04 ENCOUNTER — ROUTINE PRENATAL (OUTPATIENT)
Dept: OBGYN CLINIC | Age: 33
End: 2023-04-04
Payer: COMMERCIAL

## 2023-04-04 VITALS — BODY MASS INDEX: 33.86 KG/M2 | DIASTOLIC BLOOD PRESSURE: 78 MMHG | SYSTOLIC BLOOD PRESSURE: 120 MMHG | WEIGHT: 209.8 LBS

## 2023-04-04 DIAGNOSIS — O09.03 ENCOUNTER FOR SUPERVISION OF HIGH-RISK PREGNANCY WITH HISTORY OF INFERTILITY IN THIRD TRIMESTER: ICD-10-CM

## 2023-04-04 DIAGNOSIS — Z97.8 USES SELF-APPLIED CONTINUOUS GLUCOSE MONITORING DEVICE: ICD-10-CM

## 2023-04-04 DIAGNOSIS — Z3A.28 28 WEEKS GESTATION OF PREGNANCY: ICD-10-CM

## 2023-04-04 DIAGNOSIS — Z71.85 VACCINE COUNSELING: ICD-10-CM

## 2023-04-04 DIAGNOSIS — Z13.0 SCREENING, ANEMIA, DEFICIENCY, IRON: Primary | ICD-10-CM

## 2023-04-04 DIAGNOSIS — Z98.891 HISTORY OF CESAREAN DELIVERY: ICD-10-CM

## 2023-04-04 DIAGNOSIS — O24.113 PREGNANCY WITH TYPE 2 DIABETES MELLITUS IN THIRD TRIMESTER: ICD-10-CM

## 2023-04-04 LAB
ERYTHROCYTE [DISTWIDTH] IN BLOOD BY AUTOMATED COUNT: 13.4 % (ref 11.9–14.6)
HCT VFR BLD AUTO: 33.4 % (ref 35.8–46.3)
HGB BLD-MCNC: 10.8 G/DL (ref 11.7–15.4)
MCH RBC QN AUTO: 29 PG (ref 26.1–32.9)
MCHC RBC AUTO-ENTMCNC: 32.3 G/DL (ref 31.4–35)
MCV RBC AUTO: 89.5 FL (ref 82–102)
NRBC # BLD: 0 K/UL (ref 0–0.2)
PLATELET # BLD AUTO: 272 K/UL (ref 150–450)
PMV BLD AUTO: 12.2 FL (ref 9.4–12.3)
RBC # BLD AUTO: 3.73 M/UL (ref 4.05–5.2)
WBC # BLD AUTO: 10.5 K/UL (ref 4.3–11.1)

## 2023-04-04 PROCEDURE — 99214 OFFICE O/P EST MOD 30 MIN: CPT | Performed by: OBSTETRICS & GYNECOLOGY

## 2023-04-04 NOTE — PROGRESS NOTES
SAMMY. [id number]. 28w0d   Denies LOF, VB, Ctxs. Good FM. CBC today     Rh pos     Tdap counseling--not in stock in office today; counseled can get at pharmacy as well           Genetics:  NIPT low risk female, Carrier screening neg      OB hx:  G1  2009 at 41w0d (7#)  G2 primary LTCS at 39wks on 17 w/ Dr Lobo due to FTP w/ NRFHTs; preg complicated by P6UJ (insulin) w/ Polyhydramnios (7#15)     No issues conceiving 1st 2 pregnancies (prior to T2DM diagnosis)  Single  Current partner is FOB of G2            T2DM, BMI 33:   now seeing Endocrine who is managing    Currently on insulin and Metformin--see meds     Hemoglobin A1c significantly elevated at 8.5 (22)    hgb A1C 6.4 (22)  Hgb A1C 5.8 (November)     Dexcom  S/p MFM preconception counseling (22)--see note  Has been counseled on OB ideal parameters   hgb A1C 5.7 at preg intake (22)  ASA 162mg      Baseline HELLP labs wnl  Baseline P:C ratio 0.03      23 [facility]: Normal anatomy/echo; AC 55%, DAVID 19.0 cm. Return to [facility] in 4 weeks for repeat anatomy/echo; DM2. See DM2 Overview for Dexcom interpretation and recommendations. DM managed remotely by CDE with MFM.    3/20/23: Insulin Glargine-YFGN , Admelog 6 units with meals, 7-8 units with heavy-carb meals, 3 units with snacks. Continue Metformin 500 mg daily.   [facility]      Will need 2x/wk testing at 32wks --message sent to [facility] today to schedule      [facility]      FH today 32 today            Hx LTCS x1:  Desires repeat c/s

## 2023-04-05 ENCOUNTER — TELEPHONE (OUTPATIENT)
Dept: OBGYN CLINIC | Age: 33
End: 2023-04-05

## 2023-04-05 DIAGNOSIS — O24.112 PREGNANCY WITH TYPE 2 DIABETES MELLITUS IN SECOND TRIMESTER: ICD-10-CM

## 2023-04-05 DIAGNOSIS — Z97.8 USES SELF-APPLIED CONTINUOUS GLUCOSE MONITORING DEVICE: ICD-10-CM

## 2023-04-05 NOTE — ASSESSMENT & PLAN NOTE
Based on Dexcom, will make some adjustments in basal/bolus dosing: Insulin Glargine-TFGN 28/40, Admelog 6/8/6, 8-10 units with heavier carb meals, 3 units with snacks.

## 2023-04-05 NOTE — RESULT ENCOUNTER NOTE
hgb mildly low-->FeSO4 325mg QD w/ Vit C 500 QD    Take on empty stomach for optimal absorption. Colace/Miralax for constipation     Recheck Hgb in a few weeks. If no improvement despite medication compliance will warrant further workup/treatment.

## 2023-04-05 NOTE — ASSESSMENT & PLAN NOTE
Glucose data  Dexcom G6    Reporting period: Thu Mar 23, 2023 - Wed Apr 5, 2023    Glucose Details  Average glucose: 116 mg/dL  Standard deviation: 27 mg/dL  GMI: N/A    Time in Range  Very High: 2%  High: 27%  In Range: 69%  Low: 1%  Very Low: <1%    Target Range  Day (6:00 AM - 10:00 PM):  mg/dL  Night (10:00 PM - 6:00 AM):  mg/dL    CGM Details  Sensor usage: 71%  Days with CGM data: 10/14    Pattern interpretation  Slight decline in time in rage from 80% to 69%, most likely due to gestational age and increase in insulin resistance. Some mild elevations seen around 2pm and overnight. Adjust Basal/bolus insulin dosing.

## 2023-04-05 NOTE — TELEPHONE ENCOUNTER
CGM reviewed. Contacted pt and advised them to adjust insulin as stated in the DM2 A/P.    30 minutes spend on glucose interpretation and communication with patient by Bridger Cooley. It is the standard of care that patients with diabetes in pregnancy have glycemic control monitored weekly. This may be done in person with the diabetic educator/physician or may occur virtually via email/[employer]. You may receive a bill from [address] [place] [address] for this service.

## 2023-04-06 RX ORDER — FERROUS SULFATE 325(65) MG
325 TABLET ORAL
Qty: 30 TABLET | Refills: 5 | Status: SHIPPED | OUTPATIENT
Start: 2023-04-06

## 2023-04-06 RX ORDER — ASCORBIC ACID 500 MG
500 TABLET ORAL DAILY
Qty: 30 TABLET | Refills: 5 | Status: SHIPPED | OUTPATIENT
Start: 2023-04-06

## 2023-04-07 RX ORDER — FERROUS SULFATE 325(65) MG
325 TABLET ORAL
Qty: 30 TABLET | Refills: 5 | Status: SHIPPED | OUTPATIENT
Start: 2023-04-07

## 2023-04-07 RX ORDER — ASCORBIC ACID 500 MG
500 TABLET ORAL DAILY
Qty: 30 TABLET | Refills: 5 | Status: SHIPPED | OUTPATIENT
Start: 2023-04-07

## 2023-04-18 ENCOUNTER — ROUTINE PRENATAL (OUTPATIENT)
Dept: OBGYN CLINIC | Age: 33
End: 2023-04-18
Payer: COMMERCIAL

## 2023-04-18 VITALS — BODY MASS INDEX: 34.06 KG/M2 | DIASTOLIC BLOOD PRESSURE: 76 MMHG | SYSTOLIC BLOOD PRESSURE: 112 MMHG | WEIGHT: 211 LBS

## 2023-04-18 DIAGNOSIS — O99.013 ANEMIA DURING PREGNANCY IN THIRD TRIMESTER: ICD-10-CM

## 2023-04-18 DIAGNOSIS — E11.9 CONTROLLED TYPE 2 DIABETES MELLITUS WITHOUT COMPLICATION, WITHOUT LONG-TERM CURRENT USE OF INSULIN (HCC): ICD-10-CM

## 2023-04-18 DIAGNOSIS — O09.02 ENCOUNTER FOR SUPERVISION OF HIGH-RISK PREGNANCY WITH HISTORY OF INFERTILITY IN SECOND TRIMESTER: Primary | ICD-10-CM

## 2023-04-18 DIAGNOSIS — O24.112 PREGNANCY WITH TYPE 2 DIABETES MELLITUS IN SECOND TRIMESTER: ICD-10-CM

## 2023-04-18 DIAGNOSIS — Z98.891 HISTORY OF CESAREAN DELIVERY: ICD-10-CM

## 2023-04-18 PROCEDURE — 99214 OFFICE O/P EST MOD 30 MIN: CPT | Performed by: OBSTETRICS & GYNECOLOGY

## 2023-04-18 NOTE — PROGRESS NOTES
Reta Pablo 30w0d  Denies LOF, VB, Ctxs. Good FM. Starts 2x/wk testing in 2wks     Tdap reminder      Genetics:  NIPT low risk female, Carrier screening neg      OB hx:  G1  2009 at 41w0d (7#)  G2 primary LTCS at 39wks on 17 w/ Dr Tal Jaeger due to FTP w/ NRFHTs; preg complicated by Q5WW (insulin) w/ Polyhydramnios (7#15)     No issues conceiving 1st 2 pregnancies (prior to T2DM diagnosis)  Single  Current partner is FOB of G2            T2DM, BMI 33:   now seeing Endocrine who is managing    Currently on insulin and Metformin--see meds     Hemoglobin A1c significantly elevated at 8.5 (22)    hgb A1C 6.4 (22)  Hgb A1C 5.8 (November)     Dexcom  S/p MFM preconception counseling (22)--see note  Has been counseled on OB ideal parameters   hgb A1C 5.7 at preg intake (22)  ASA 162mg      Baseline HELLP labs wnl  Baseline P:C ratio 0.03      23 [facility]: Normal anatomy/echo; AC 55%, DAVID 19.0 cm. Return to [facility] in 4 weeks for repeat anatomy/echo; DM2. See DM2 Overview for Dexcom interpretation and recommendations. DM managed remotely by CDE with MFM.    3/20/23: Insulin Glargine-YFGN , Admelog 6 units with meals, 7-8 units with heavy-carb meals, 3 units with snacks. Continue Metformin 500 mg daily.   Growht US wnl      Will need 2x/wk testing at 32wks --message sent to [facility] today to schedule      FU w/ MFM on  w/ growht US ___      Now on , w/ meals , 8-10 if heavy carb  + \"sometimes\" metformin            Hx LTCS x1:  Desires repeat c/s            Mild anemia:  Hgb 10.8 (23)   Start iron/vit c QD or every other  Repeat at [address] __

## 2023-04-21 ENCOUNTER — ROUTINE PRENATAL (OUTPATIENT)
Dept: OBGYN CLINIC | Age: 33
End: 2023-04-21

## 2023-04-21 VITALS — SYSTOLIC BLOOD PRESSURE: 110 MMHG | DIASTOLIC BLOOD PRESSURE: 70 MMHG | HEART RATE: 85 BPM

## 2023-04-21 DIAGNOSIS — E28.2 POLYCYSTIC OVARY AFFECTING PREGNANCY, ANTEPARTUM: ICD-10-CM

## 2023-04-21 DIAGNOSIS — O24.112 PREGNANCY WITH TYPE 2 DIABETES MELLITUS IN SECOND TRIMESTER: ICD-10-CM

## 2023-04-21 DIAGNOSIS — O34.80 POLYCYSTIC OVARY AFFECTING PREGNANCY, ANTEPARTUM: ICD-10-CM

## 2023-04-21 DIAGNOSIS — Z97.8 USES SELF-APPLIED CONTINUOUS GLUCOSE MONITORING DEVICE: ICD-10-CM

## 2023-04-21 DIAGNOSIS — Z98.891 HISTORY OF CESAREAN DELIVERY: ICD-10-CM

## 2023-04-21 DIAGNOSIS — O99.212 OBESITY AFFECTING PREGNANCY IN SECOND TRIMESTER: ICD-10-CM

## 2023-04-21 DIAGNOSIS — Z3A.30 30 WEEKS GESTATION OF PREGNANCY: Primary | ICD-10-CM

## 2023-04-21 DIAGNOSIS — Z87.442 HISTORY OF KIDNEY STONES: ICD-10-CM

## 2023-04-21 DIAGNOSIS — O09.02 ENCOUNTER FOR SUPERVISION OF HIGH-RISK PREGNANCY WITH HISTORY OF INFERTILITY IN SECOND TRIMESTER: ICD-10-CM

## 2023-04-21 DIAGNOSIS — E11.9 CONTROLLED TYPE 2 DIABETES MELLITUS WITHOUT COMPLICATION, WITHOUT LONG-TERM CURRENT USE OF INSULIN (HCC): ICD-10-CM

## 2023-04-21 ASSESSMENT — PATIENT HEALTH QUESTIONNAIRE - PHQ9
SUM OF ALL RESPONSES TO PHQ9 QUESTIONS 1 & 2: 0
SUM OF ALL RESPONSES TO PHQ QUESTIONS 1-9: 0
1. LITTLE INTEREST OR PLEASURE IN DOING THINGS: 0
SUM OF ALL RESPONSES TO PHQ QUESTIONS 1-9: 0
2. FEELING DOWN, DEPRESSED OR HOPELESS: 0

## 2023-04-21 NOTE — PROGRESS NOTES
MFM Follow-up Visit    Sierra Renteria (: 1990) is a 35 y.o. Richad Sartorius at 30w3d with 2023, by Last Menstrual Period. Presents for evaluation of the following chief complaint(s):  High Risk Pregnancy (DM2, H/O Infertility, BMI>30)     Pt was seen previously in 2022 for [facility] for DM2. This pregnancy was assisted with Femara. Patient is [facility] AT [facility]. Scheduled to see primary OB [facility]) on 2023. Recently moved to [place], getting settled.  will have cervical spine surgery 5/3. DM2: See Problem List. Good control on Dexcom G6 CGM and MDI. See A/P re DM care. No HAs, edema. Denies preeclamptic symptoms. Reports good fetal movement. No bleeding, LOF, cramping, ctxs, or vaginal pressure. Interval history since prior appt reviewed and updated as indicated. Review of Systems - per HPI; otherwise unremarkable. Exam:     Vitals:    23 0837   BP: 110/70   Pulse: 85     Recent Labs Reviewed. Please see formal ultrasound report under imaging tab. ASSESSMENT/PLAN:  Patient Active Problem List    Diagnosis Date Noted    Controlled type 2 diabetes mellitus without complication, without long-term current use of insulin ([facility] [address] 2022     Priority: High     Overview Note:     hgb A1C 8.5 (22)  Hgb A1C continues to improve at 5.7 (22)     23 [facility]: Glucose log reviewed. Overall good control. Current treatment: Long Acting 26-28 units in am and 40 units at night. Short actin units with breakfast, lunch and dinner. Also takes Metformin 500mg before dinner.       Pregnancy with type 2 diabetes mellitus in second trimester      Priority: High     Overview Note:     T2DM, BMI 33:   now seeing Endocrine who is managing       Currently on insulin and Metformin--see meds     Hemoglobin A1c significantly elevated at 8.5 (22)    hgb A1C 6.4 (22)  Hgb A1C 5.8 (November)     Just got a Dex com  S/p MFM preconception counseling (22)--see

## 2023-05-01 ENCOUNTER — ROUTINE PRENATAL (OUTPATIENT)
Dept: OBGYN CLINIC | Age: 33
End: 2023-05-01
Payer: COMMERCIAL

## 2023-05-01 VITALS — BODY MASS INDEX: 34.54 KG/M2 | SYSTOLIC BLOOD PRESSURE: 120 MMHG | DIASTOLIC BLOOD PRESSURE: 68 MMHG | WEIGHT: 214 LBS

## 2023-05-01 DIAGNOSIS — O99.013 ANEMIA DURING PREGNANCY IN THIRD TRIMESTER: ICD-10-CM

## 2023-05-01 DIAGNOSIS — O09.03 ENCOUNTER FOR SUPERVISION OF HIGH-RISK PREGNANCY WITH HISTORY OF INFERTILITY IN THIRD TRIMESTER: ICD-10-CM

## 2023-05-01 DIAGNOSIS — E28.2 POLYCYSTIC OVARY AFFECTING PREGNANCY, ANTEPARTUM: ICD-10-CM

## 2023-05-01 DIAGNOSIS — O99.212 OBESITY AFFECTING PREGNANCY IN SECOND TRIMESTER: ICD-10-CM

## 2023-05-01 DIAGNOSIS — O24.112 PREGNANCY WITH TYPE 2 DIABETES MELLITUS IN SECOND TRIMESTER: Primary | ICD-10-CM

## 2023-05-01 DIAGNOSIS — Z98.891 HISTORY OF CESAREAN DELIVERY: ICD-10-CM

## 2023-05-01 DIAGNOSIS — O34.80 POLYCYSTIC OVARY AFFECTING PREGNANCY, ANTEPARTUM: ICD-10-CM

## 2023-05-01 PROCEDURE — 59025 FETAL NON-STRESS TEST: CPT | Performed by: OBSTETRICS & GYNECOLOGY

## 2023-05-01 PROCEDURE — 99213 OFFICE O/P EST LOW 20 MIN: CPT | Performed by: OBSTETRICS & GYNECOLOGY

## 2023-05-01 NOTE — PROGRESS NOTES
SAMMY. [id number] 31w6d    Denies LOF, VB, Ctxs. Good FM. 2x/wk testing    Vitals:    23 0841   BP: 120/68     Weight Metrics 2023 2023 2023 3/2/2023 2023 2023 1/3/2023   Weight 214 lb 211 lb 209 lb 12.8 oz 204 lb 202 lb 197 lb 3.2 oz 202 lb   BMI (Calculated) 0 kg/m2 0 kg/m2 0 kg/m2 0 kg/m2 0 kg/m2 31.9 kg/m2 0 kg/m2       NST:  Reactive NST, moderate variability, baseline 140, ++ accels (x2), no decelerations, no ctx. Feeling fetal movement. 1. Pregnancy with type 2 diabetes mellitus in third trimester    2. Obesity affecting pregnancy in third trimester    3. History of  delivery    4. Polycystic ovary affecting pregnancy, antepartum    5. Anemia during pregnancy in third trimester    6. Encounter for supervision of high-risk pregnancy with history of infertility in third trimester      NST REACTIVE  BS discussed  Routine OB counseling reviewed. Kick counts discussed. Reviewed MFM notes. Growth WNL at [facility] on 23, dopplers normal then as well. Has FU on 23 w/ [facility] for another growth. Fetus was breech at [facility], no issue if planning RLTCD. Continue 2x wk testing    Greater than 20 minutes spent on same day of service in interpreting her NST, reviewing her prenatal record, problem list, MFM notes if applicable and in face to face counseling, education, and examining the patient regarding her exam findings, indications for further tests, and discussion of assessment and plan as stated above.       Terrance Palumbo MD FACOG    Tdap reminder      Genetics:  NIPT low risk female, Carrier screening neg      OB hx:  G1  2009 at 41w0d (7#)  G2 primary LTCS at 39wks on 17 w/ Dr Srinivasa Dan due to FTP w/ NRFHTs; preg complicated by B9KC (insulin) w/ Polyhydramnios (7#15)     No issues conceiving 1st 2 pregnancies (prior to T2DM diagnosis)  Single  Current partner is FOB of G2            T2DM, BMI 33:   now seeing Endocrine who is managing    Currently on insulin

## 2023-05-04 ENCOUNTER — ROUTINE PRENATAL (OUTPATIENT)
Dept: OBGYN CLINIC | Age: 33
End: 2023-05-04
Payer: COMMERCIAL

## 2023-05-04 VITALS — BODY MASS INDEX: 34.38 KG/M2 | DIASTOLIC BLOOD PRESSURE: 68 MMHG | WEIGHT: 213 LBS | SYSTOLIC BLOOD PRESSURE: 110 MMHG

## 2023-05-04 DIAGNOSIS — O09.03 ENCOUNTER FOR SUPERVISION OF HIGH-RISK PREGNANCY WITH HISTORY OF INFERTILITY IN THIRD TRIMESTER: Primary | ICD-10-CM

## 2023-05-04 DIAGNOSIS — O24.112 PREGNANCY WITH TYPE 2 DIABETES MELLITUS IN SECOND TRIMESTER: ICD-10-CM

## 2023-05-04 DIAGNOSIS — Z86.32 HISTORY OF GESTATIONAL DIABETES: ICD-10-CM

## 2023-05-04 DIAGNOSIS — O99.013 ANEMIA DURING PREGNANCY IN THIRD TRIMESTER: ICD-10-CM

## 2023-05-04 DIAGNOSIS — Z98.891 HISTORY OF CESAREAN DELIVERY: ICD-10-CM

## 2023-05-04 DIAGNOSIS — E11.9 CONTROLLED TYPE 2 DIABETES MELLITUS WITHOUT COMPLICATION, WITHOUT LONG-TERM CURRENT USE OF INSULIN (HCC): ICD-10-CM

## 2023-05-04 LAB
ERYTHROCYTE [DISTWIDTH] IN BLOOD BY AUTOMATED COUNT: 13.3 % (ref 11.9–14.6)
HCT VFR BLD AUTO: 34.1 % (ref 35.8–46.3)
HGB BLD-MCNC: 10.8 G/DL (ref 11.7–15.4)
MCH RBC QN AUTO: 27.6 PG (ref 26.1–32.9)
MCHC RBC AUTO-ENTMCNC: 31.7 G/DL (ref 31.4–35)
MCV RBC AUTO: 87.2 FL (ref 82–102)
NRBC # BLD: 0 K/UL (ref 0–0.2)
PLATELET # BLD AUTO: 265 K/UL (ref 150–450)
PMV BLD AUTO: 11.8 FL (ref 9.4–12.3)
RBC # BLD AUTO: 3.91 M/UL (ref 4.05–5.2)
WBC # BLD AUTO: 9.4 K/UL (ref 4.3–11.1)

## 2023-05-04 PROCEDURE — 76819 FETAL BIOPHYS PROFIL W/O NST: CPT | Performed by: OBSTETRICS & GYNECOLOGY

## 2023-05-04 PROCEDURE — 99214 OFFICE O/P EST MOD 30 MIN: CPT | Performed by: OBSTETRICS & GYNECOLOGY

## 2023-05-04 NOTE — PROGRESS NOTES
MICHAEL [id number] 32w2d  Denies LOF, VB, Ctxs. Good FM. Tdap reminder         OB hx:  G1  2009 at 41w0d (7#)  G2 primary LTCS at 39wks on 17 w/ Dr Sofie Gonzalez due to FTP w/ NRFHTs; preg complicated by C5PA (insulin) w/ Polyhydramnios (7#15)     No issues conceiving 1st 2 pregnancies (prior to T2DM diagnosis)  Single  Current partner is FOB of G2            T2DM, BMI 33:   NIPT low risk female, Carrier screening neg  now seeing Endocrine who is managing    Currently on insulin and Metformin--see meds     Hemoglobin A1c significantly elevated at 8.5 (22)    hgb A1C 6.4 (22)  Hgb A1C 5.8 (November)     Dexcom  S/p MFM preconception counseling (22)--see note  Has been counseled on OB ideal parameters   hgb A1C 5.7 at preg intake (22)  ASA 162mg      Baseline HELLP labs wnl  Baseline P:C ratio 0.03      23 [facility]:  Reassuring Fetal Status, AC 27%, Overall 41%, DAVID 22.7 (DVP 7.2).     Sees MFM again          2x/wk testing:    BPP today , DAVID 22 (DVP 9.5)--c/w POLYhydramnios           Hx LTCS x1:  Desires repeat c/s            Mild anemia:  Hgb 10.8 (23)   Start iron/vit c QD or every other  --- states has not been taking   Repeat CBC today  __

## 2023-05-08 ENCOUNTER — ROUTINE PRENATAL (OUTPATIENT)
Dept: OBGYN CLINIC | Age: 33
End: 2023-05-08
Payer: COMMERCIAL

## 2023-05-08 VITALS — BODY MASS INDEX: 34.38 KG/M2 | WEIGHT: 213 LBS | SYSTOLIC BLOOD PRESSURE: 110 MMHG | DIASTOLIC BLOOD PRESSURE: 60 MMHG

## 2023-05-08 DIAGNOSIS — Z71.85 VACCINE COUNSELING: ICD-10-CM

## 2023-05-08 DIAGNOSIS — O99.212 OBESITY AFFECTING PREGNANCY IN SECOND TRIMESTER: ICD-10-CM

## 2023-05-08 DIAGNOSIS — Z98.891 HISTORY OF CESAREAN DELIVERY: ICD-10-CM

## 2023-05-08 DIAGNOSIS — O99.019 ANTEPARTUM ANEMIA: ICD-10-CM

## 2023-05-08 DIAGNOSIS — E28.2 POLYCYSTIC OVARY AFFECTING PREGNANCY, ANTEPARTUM: ICD-10-CM

## 2023-05-08 DIAGNOSIS — O34.80 POLYCYSTIC OVARY AFFECTING PREGNANCY, ANTEPARTUM: ICD-10-CM

## 2023-05-08 DIAGNOSIS — O24.112 PREGNANCY WITH TYPE 2 DIABETES MELLITUS IN SECOND TRIMESTER: ICD-10-CM

## 2023-05-08 DIAGNOSIS — Z97.8 USES SELF-APPLIED CONTINUOUS GLUCOSE MONITORING DEVICE: ICD-10-CM

## 2023-05-08 DIAGNOSIS — O09.03 ENCOUNTER FOR SUPERVISION OF HIGH-RISK PREGNANCY WITH HISTORY OF INFERTILITY IN THIRD TRIMESTER: Primary | ICD-10-CM

## 2023-05-08 PROCEDURE — 99214 OFFICE O/P EST MOD 30 MIN: CPT | Performed by: OBSTETRICS & GYNECOLOGY

## 2023-05-08 NOTE — PROGRESS NOTES
MICHAEL [id number] 32w6d  Denies LOF, VB, Ctxs. Good FM. Tdap reminder         OB hx:  G1  2009 at 41w0d (7#)  G2 primary LTCS at 39wks on 17 w/ Dr Farley Closs due to FTP w/ NRFHTs; preg complicated by L1KY (insulin) w/ Polyhydramnios (7#15)     No issues conceiving 1st 2 pregnancies (prior to T2DM diagnosis)  Single  Current partner is FOB of G2            T2DM, Polyhydramnios,  BMI 33:   NIPT low risk female, Carrier screening neg  now seeing Endocrine who is managing    Currently on insulin and Metformin--see meds     Hemoglobin A1c significantly elevated at 8.5 (22)    hgb A1C 6.4 (22)  Hgb A1C 5.8 (November)     Dexcom  S/p MFM preconception counseling (22)--see note  Has been counseled on OB ideal parameters   hgb A1C 5.7 at preg intake (22)  ASA 162mg      Baseline HELLP labs wnl  Baseline P:C ratio 0.03      23 [facility]:  Reassuring Fetal Status, AC 27%, Overall 41%, DAVID 22.7 (DVP 7.2).     Sees MFM again          2x/wk testin/4/23:  BPP 8/8, DAVID 22 (DVP 9.5)--c/w POLYhydramnios   Today:  NST Cat I, R ; 2 ctxs on TOCO -- pt states does not feel any     Precautions given  Will plan to schedule repeat c/s at [place] __            Hx LTCS x1:  Desires repeat c/s            Mild anemia:  Hgb 10.8 (23)   Start iron/vit c QD or every other  --- states has not been taking   Hgb 10.8 (23)

## 2023-05-11 ENCOUNTER — ROUTINE PRENATAL (OUTPATIENT)
Dept: OBGYN CLINIC | Age: 33
End: 2023-05-11
Payer: COMMERCIAL

## 2023-05-11 VITALS — SYSTOLIC BLOOD PRESSURE: 108 MMHG | DIASTOLIC BLOOD PRESSURE: 68 MMHG

## 2023-05-11 DIAGNOSIS — O99.213 OBESITY AFFECTING PREGNANCY IN THIRD TRIMESTER: ICD-10-CM

## 2023-05-11 DIAGNOSIS — Z3A.33 33 WEEKS GESTATION OF PREGNANCY: ICD-10-CM

## 2023-05-11 DIAGNOSIS — Z98.891 HISTORY OF CESAREAN DELIVERY: ICD-10-CM

## 2023-05-11 DIAGNOSIS — E28.2 POLYCYSTIC OVARY AFFECTING PREGNANCY, ANTEPARTUM: ICD-10-CM

## 2023-05-11 DIAGNOSIS — O24.113 PREGNANCY WITH TYPE 2 DIABETES MELLITUS IN THIRD TRIMESTER: ICD-10-CM

## 2023-05-11 DIAGNOSIS — O34.80 POLYCYSTIC OVARY AFFECTING PREGNANCY, ANTEPARTUM: ICD-10-CM

## 2023-05-11 DIAGNOSIS — Z86.32 HISTORY OF GESTATIONAL DIABETES: ICD-10-CM

## 2023-05-11 DIAGNOSIS — E11.9 CONTROLLED TYPE 2 DIABETES MELLITUS WITHOUT COMPLICATION, WITHOUT LONG-TERM CURRENT USE OF INSULIN (HCC): Primary | ICD-10-CM

## 2023-05-11 DIAGNOSIS — Z97.8 USES SELF-APPLIED CONTINUOUS GLUCOSE MONITORING DEVICE: ICD-10-CM

## 2023-05-11 DIAGNOSIS — Z87.442 HISTORY OF KIDNEY STONES: ICD-10-CM

## 2023-05-11 DIAGNOSIS — O99.019 ANTEPARTUM ANEMIA: ICD-10-CM

## 2023-05-11 DIAGNOSIS — O09.03 ENCOUNTER FOR SUPERVISION OF HIGH-RISK PREGNANCY WITH HISTORY OF INFERTILITY IN THIRD TRIMESTER: ICD-10-CM

## 2023-05-11 PROCEDURE — 76819 FETAL BIOPHYS PROFIL W/O NST: CPT | Performed by: OBSTETRICS & GYNECOLOGY

## 2023-05-11 PROCEDURE — 76816 OB US FOLLOW-UP PER FETUS: CPT | Performed by: OBSTETRICS & GYNECOLOGY

## 2023-05-11 PROCEDURE — 99214 OFFICE O/P EST MOD 30 MIN: CPT | Performed by: OBSTETRICS & GYNECOLOGY

## 2023-05-15 ENCOUNTER — ROUTINE PRENATAL (OUTPATIENT)
Dept: OBGYN CLINIC | Age: 33
End: 2023-05-15
Payer: COMMERCIAL

## 2023-05-15 VITALS — BODY MASS INDEX: 34.54 KG/M2 | WEIGHT: 214 LBS | DIASTOLIC BLOOD PRESSURE: 60 MMHG | SYSTOLIC BLOOD PRESSURE: 110 MMHG

## 2023-05-15 DIAGNOSIS — O09.03 ENCOUNTER FOR SUPERVISION OF HIGH-RISK PREGNANCY WITH HISTORY OF INFERTILITY IN THIRD TRIMESTER: ICD-10-CM

## 2023-05-15 DIAGNOSIS — O99.213 OBESITY AFFECTING PREGNANCY IN THIRD TRIMESTER: ICD-10-CM

## 2023-05-15 DIAGNOSIS — Z3A.33 33 WEEKS GESTATION OF PREGNANCY: ICD-10-CM

## 2023-05-15 DIAGNOSIS — O24.113 PREGNANCY WITH TYPE 2 DIABETES MELLITUS IN THIRD TRIMESTER: Primary | ICD-10-CM

## 2023-05-15 PROCEDURE — 59025 FETAL NON-STRESS TEST: CPT | Performed by: OBSTETRICS & GYNECOLOGY

## 2023-05-15 PROCEDURE — 99213 OFFICE O/P EST LOW 20 MIN: CPT | Performed by: OBSTETRICS & GYNECOLOGY

## 2023-05-15 NOTE — ASSESSMENT & PLAN NOTE
Based on Dexcom, will adjust LA insulin during the day to keep up with insulin needs of the 3rd trimester. Adjust insulin - Insulin Glargine-yfgn 3240, Admelog , 8-10 units with heavier carb meals, 3 units with snacks. Stopped Metformin. · Recommend delivery In 39th week, sooner if maternal or fetal concerns  ·  testing at least twice weekly beginning at 32 weeks in OB office. · May begin  milk expression program at 37 weeks. Pt to contact lactation consultants re this program in 35-36th weeks. Intrapartum Recommendations:  · Check glucose every 2hr in latent labor, 1hr in active labor. · Goal of glucose  while in labor, adjust IVF and insulin as needed. Postpartum Recommendations:   · Continue to monitor glucoses- fasting, 1hr postprandial. Determine need for medication based on these findings- may need none, oral medication, insulin. · Plan to see PCP for diabetes care following postpartum period.

## 2023-05-15 NOTE — PROGRESS NOTES
Josselin Arevalo  presents for SAMMY. . 33w6d. PL and MFM notes reviewed as applicable. Taking Prenatal Vitamins: Yes  She is noticing:  no unusual complaints    Rnst with baseline 130-140. No decels. One ctx- not felt  Pt on for 23 min    Josselin Arevalo was seen today for routine prenatal visit.     Diagnoses and all orders for this visit:    Pregnancy with type 2 diabetes mellitus in third trimester    Encounter for supervision of high-risk pregnancy with history of infertility in third trimester    Obesity affecting pregnancy in third trimester    33 weeks gestation of pregnancy

## 2023-05-18 ENCOUNTER — TELEPHONE (OUTPATIENT)
Dept: OBGYN CLINIC | Age: 33
End: 2023-05-18

## 2023-05-18 ENCOUNTER — ROUTINE PRENATAL (OUTPATIENT)
Dept: OBGYN CLINIC | Age: 33
End: 2023-05-18

## 2023-05-18 VITALS — BODY MASS INDEX: 34.7 KG/M2 | DIASTOLIC BLOOD PRESSURE: 68 MMHG | SYSTOLIC BLOOD PRESSURE: 112 MMHG | WEIGHT: 215 LBS

## 2023-05-18 DIAGNOSIS — E28.2 POLYCYSTIC OVARY AFFECTING PREGNANCY, ANTEPARTUM: ICD-10-CM

## 2023-05-18 DIAGNOSIS — O24.113 PREGNANCY WITH TYPE 2 DIABETES MELLITUS IN THIRD TRIMESTER: Primary | ICD-10-CM

## 2023-05-18 DIAGNOSIS — Z98.891 HISTORY OF CESAREAN DELIVERY: ICD-10-CM

## 2023-05-18 DIAGNOSIS — O34.80 POLYCYSTIC OVARY AFFECTING PREGNANCY, ANTEPARTUM: ICD-10-CM

## 2023-05-18 DIAGNOSIS — O99.019 ANTEPARTUM ANEMIA: ICD-10-CM

## 2023-05-18 DIAGNOSIS — O99.213 OBESITY AFFECTING PREGNANCY IN THIRD TRIMESTER: ICD-10-CM

## 2023-05-18 NOTE — PROGRESS NOTES
MICHAEL [id number] 34w2d  Denies LOF, VB, Ctxs. Good FM. OB hx:  G1  2009 at 41w0d (7#)  G2 primary LTCS at 39wks on 17 w/ Dr Alfredo James due to FTP w/ NRFHTs; preg complicated by S5OD (insulin) w/ Polyhydramnios (7#15)     No issues conceiving 1st 2 pregnancies (prior to T2DM diagnosis)  Single  Current partner is FOB of G2            T2DM, Polyhydramnios,  BMI 33:   NIPT low risk female, Carrier screening neg  now seeing Endocrine who is managing    Currently on insulin and Metformin--see meds     Hemoglobin A1c significantly elevated at 8.5 (22)    hgb A1C 6.4 (22)  Hgb A1C 5.8 (November)     Dexcom  S/p MFM preconception counseling (22)--see note  Has been counseled on OB ideal parameters   hgb A1C 5.7 at preg intake (22)  ASA 162mg      Baseline HELLP labs wnl  Baseline P:C ratio 0.03      2023 at [facility]: Appropriate growth; AC 30%, Overall 42%, DAVID 21.1cm, UA Dopplers WNL, BPP . No follow-up with [facility], refer back PRN  See DM2 Overview for Dexcom interpretation and recommendations. DM managed remotely by CDE with MFM. Continue twice weekly testing at [facility] office at 32 weeks.  prenatal expression can begin at 37 weeks.   Deliver at 39 weeks, sooner for maternal/fetal concerns      2x/wk testing:    BPP today , DAVID 21 (DVP 7cm)   Vertex          Hx LTCS x1:  Desires repeat c/s     C/s scheduled for 23 at 39w0d-- I'm on call  -- form sent today ___            Mild anemia:  Hgb 10.8 (23)   Start iron/vit c QD or every other  --- states has not been taking   Hgb 10.8 (23)

## 2023-05-18 NOTE — TELEPHONE ENCOUNTER
KARAN VILCHIS L&D, Repeat C/S scheduled for 6/20/23 @ 0900 with . Pt instructions sent via [employer]. IOL form faxed to [employer]. Detail Level: Simple

## 2023-05-22 ENCOUNTER — ROUTINE PRENATAL (OUTPATIENT)
Dept: OBGYN CLINIC | Age: 33
End: 2023-05-22

## 2023-05-22 VITALS — SYSTOLIC BLOOD PRESSURE: 118 MMHG | WEIGHT: 215 LBS | DIASTOLIC BLOOD PRESSURE: 76 MMHG | BODY MASS INDEX: 34.7 KG/M2

## 2023-05-22 DIAGNOSIS — O99.019 ANTEPARTUM ANEMIA: ICD-10-CM

## 2023-05-22 DIAGNOSIS — Z98.891 HISTORY OF CESAREAN DELIVERY: ICD-10-CM

## 2023-05-22 DIAGNOSIS — O09.03 ENCOUNTER FOR SUPERVISION OF HIGH-RISK PREGNANCY WITH HISTORY OF INFERTILITY IN THIRD TRIMESTER: Primary | ICD-10-CM

## 2023-05-22 DIAGNOSIS — E11.9 CONTROLLED TYPE 2 DIABETES MELLITUS WITHOUT COMPLICATION, WITHOUT LONG-TERM CURRENT USE OF INSULIN (HCC): ICD-10-CM

## 2023-05-22 DIAGNOSIS — O24.113 PREGNANCY WITH TYPE 2 DIABETES MELLITUS IN THIRD TRIMESTER: ICD-10-CM

## 2023-05-22 DIAGNOSIS — Z86.32 HISTORY OF GESTATIONAL DIABETES: ICD-10-CM

## 2023-05-22 DIAGNOSIS — O99.213 OBESITY AFFECTING PREGNANCY IN THIRD TRIMESTER: ICD-10-CM

## 2023-05-22 NOTE — PROGRESS NOTES
MICHAEL [id number] 34w6d  Denies LOF, VB, Ctxs. Good FM. OB hx:  G1  2009 at 41w0d (7#)  G2 primary LTCS at 39wks on 17 w/ Dr Ramon Polanco due to FTP w/ NRFHTs; preg complicated by L3MD (insulin) w/ Polyhydramnios (7#15)     No issues conceiving 1st 2 pregnancies (prior to T2DM diagnosis)  Single  Current partner is FOB of G2            T2DM, Polyhydramnios,  BMI 33:   NIPT low risk female, Carrier screening neg  now seeing Endocrine who is managing    Currently on insulin and Metformin--see meds     Hemoglobin A1c significantly elevated at 8.5 (22)    hgb A1C 6.4 (22)  Hgb A1C 5.8 (November)     Dexcom  S/p MFM preconception counseling (22)--see note  Has been counseled on OB ideal parameters   hgb A1C 5.7 at preg intake (22)  ASA 162mg      Baseline HELLP labs wnl  Baseline P:C ratio 0.03      2023 at [facility]: Appropriate growth; AC 30%, Overall 42%, DAVID 21.1cm, UA Dopplers WNL, BPP . No follow-up with [facility], refer back PRN  See DM2 Overview for Dexcom interpretation and recommendations. DM managed remotely by CDE with MFM. Continue twice weekly testing at [facility] office at 32 weeks.  prenatal expression can begin at 37 weeks.   Deliver at 39 weeks, sooner for maternal/fetal concerns      2x/wk testing:    NST cat I, R today  Some ctxs--- not feeling most           Hx LTCS x1:  Desires repeat c/s     C/s scheduled for 23 at 39w0d w/ me            Mild anemia:  Hgb 10.8 (23)   Start iron/vit c QD or every other  --- states has not been taking   Hgb 10.8 (23)

## 2023-05-23 ENCOUNTER — TELEPHONE (OUTPATIENT)
Dept: OBGYN CLINIC | Age: 33
End: 2023-05-23
Payer: COMMERCIAL

## 2023-05-23 DIAGNOSIS — E11.9 CONTROLLED TYPE 2 DIABETES MELLITUS WITHOUT COMPLICATION, WITHOUT LONG-TERM CURRENT USE OF INSULIN (HCC): ICD-10-CM

## 2023-05-23 DIAGNOSIS — Z97.8 USES SELF-APPLIED CONTINUOUS GLUCOSE MONITORING DEVICE: ICD-10-CM

## 2023-05-23 DIAGNOSIS — O24.113 PREGNANCY WITH TYPE 2 DIABETES MELLITUS IN THIRD TRIMESTER: Primary | ICD-10-CM

## 2023-05-23 PROCEDURE — 95251 CONT GLUC MNTR ANALYSIS I&R: CPT | Performed by: OBSTETRICS & GYNECOLOGY

## 2023-05-23 NOTE — ASSESSMENT & PLAN NOTE
Glucose data  Dexcom G6    Reporting period: Wed May 10, 2023 - Tue May 23, 2023    Glucose Details  Average glucose: 114 mg/dL  Standard deviation: 22 mg/dL  GMI: N/A  -----------------------------  Time in Range  Very High: <1%  High: 30%  In Range: 68%  Low: 1%  Very Low: <1%    Target Range  Day (6:00 AM - 10:00 PM):  mg/dL  Night (10:00 PM - 6:00 AM):  mg/dL    CGM Details  Sensor usage: 64%  Days with CGM data: 9/14    Pattern interpretation  Slight increase in daytime and overnight elevations. ITR below goal of 70%. SD is still WNL.

## 2023-05-23 NOTE — TELEPHONE ENCOUNTER
CGM reviewed for the past two weeks. Contacted pt and advised them to adjust insulin as stated in the DM2 A/P.    30 minutes spend on glucose interpretation and communication with patient by Luis Fernando Cox. It is the standard of care that patients with diabetes in pregnancy have glycemic control monitored weekly. This may be done in person with the diabetic educator/physician or may occur virtually via email/[employer]. You may receive a bill from [address] [place] [address] for this service.

## 2023-05-23 NOTE — ASSESSMENT & PLAN NOTE
Based on Dexcom, will adjust LA/SA insulin during the day to keep up with insulin needs of the 3rd trimester. Adjust insulin - Insulin Glargine-yfgn 34/42, Admelog 7/9/7, 10-12 units with heavier carb meals, 3 units with snacks. Stopped Metformin.

## 2023-05-25 ENCOUNTER — ROUTINE PRENATAL (OUTPATIENT)
Dept: OBGYN CLINIC | Age: 33
End: 2023-05-25

## 2023-05-25 VITALS — BODY MASS INDEX: 34.7 KG/M2 | SYSTOLIC BLOOD PRESSURE: 126 MMHG | DIASTOLIC BLOOD PRESSURE: 76 MMHG | WEIGHT: 215 LBS

## 2023-05-25 DIAGNOSIS — O99.019 ANTEPARTUM ANEMIA: ICD-10-CM

## 2023-05-25 DIAGNOSIS — Z36.85 ANTENATAL SCREENING FOR STREPTOCOCCUS B: ICD-10-CM

## 2023-05-25 DIAGNOSIS — O09.03 ENCOUNTER FOR SUPERVISION OF HIGH-RISK PREGNANCY WITH HISTORY OF INFERTILITY IN THIRD TRIMESTER: Primary | ICD-10-CM

## 2023-05-25 DIAGNOSIS — Z3A.35 35 WEEKS GESTATION OF PREGNANCY: ICD-10-CM

## 2023-05-25 DIAGNOSIS — O24.113 PREGNANCY WITH TYPE 2 DIABETES MELLITUS IN THIRD TRIMESTER: ICD-10-CM

## 2023-05-25 NOTE — PROGRESS NOTES
Rashi Mahmood  presents for SAMMY. [id number]. 35w2d. PL and MFM notes reviewed as applicable. Taking Prenatal Vitamins: Yes  She is noticing:  no unusual complaints. BSs- using dexcom. On insulin  Explained gbs testing and tx if needed    US shows:  bpp 8  pascale 23  Cephalic    Rashi Mahmood was seen today for routine prenatal visit. Diagnoses and all orders for this visit:    Encounter for supervision of high-risk pregnancy with history of infertility in third trimester  -     AMB POC US FETAL BIOPHYSICAL PROFILE W/O NON STRESS TESTING    Pregnancy with type 2 diabetes mellitus in third trimester  -     AMB POC US FETAL BIOPHYSICAL PROFILE W/O NON STRESS TESTING    Antepartum anemia  -     AMB POC US FETAL BIOPHYSICAL PROFILE W/O NON STRESS TESTING    35 weeks gestation of pregnancy     screening for streptococcus B  -     Culture, Strep B Screen, Vaginal/Rectal; Future        Images reviewed.

## 2023-05-28 LAB
BACTERIA SPEC CULT: NORMAL
SERVICE CMNT-IMP: NORMAL

## 2023-05-29 LAB
BACTERIA SPEC CULT: NORMAL
SERVICE CMNT-IMP: NORMAL

## 2023-05-30 ENCOUNTER — ROUTINE PRENATAL (OUTPATIENT)
Dept: OBGYN CLINIC | Age: 33
End: 2023-05-30
Payer: COMMERCIAL

## 2023-05-30 VITALS — SYSTOLIC BLOOD PRESSURE: 120 MMHG | WEIGHT: 217 LBS | DIASTOLIC BLOOD PRESSURE: 68 MMHG | BODY MASS INDEX: 35.02 KG/M2

## 2023-05-30 DIAGNOSIS — Z3A.36 36 WEEKS GESTATION OF PREGNANCY: ICD-10-CM

## 2023-05-30 DIAGNOSIS — O99.019 ANTEPARTUM ANEMIA: ICD-10-CM

## 2023-05-30 DIAGNOSIS — O09.03 ENCOUNTER FOR SUPERVISION OF HIGH-RISK PREGNANCY WITH HISTORY OF INFERTILITY IN THIRD TRIMESTER: Primary | ICD-10-CM

## 2023-05-30 DIAGNOSIS — Z98.891 HISTORY OF CESAREAN DELIVERY: ICD-10-CM

## 2023-05-30 DIAGNOSIS — O99.213 OBESITY AFFECTING PREGNANCY IN THIRD TRIMESTER: ICD-10-CM

## 2023-05-30 DIAGNOSIS — Z97.8 USES SELF-APPLIED CONTINUOUS GLUCOSE MONITORING DEVICE: ICD-10-CM

## 2023-05-30 DIAGNOSIS — O24.113 PREGNANCY WITH TYPE 2 DIABETES MELLITUS IN THIRD TRIMESTER: ICD-10-CM

## 2023-05-30 PROCEDURE — 99214 OFFICE O/P EST MOD 30 MIN: CPT | Performed by: OBSTETRICS & GYNECOLOGY

## 2023-05-30 NOTE — PROGRESS NOTES
Reta Pablo. 36w0d   Denies LOF, VB, Ctxs. Good FM. GBS NEG   SVE:  defers     DC ASA            OB hx:  G1  2009 at 41w0d (7#)  G2 primary LTCS at 39wks on 17 w/ Dr Tal Jaeger due to FTP w/ NRFHTs; preg complicated by E2EQ (insulin) w/ Polyhydramnios (7#15)     No issues conceiving 1st 2 pregnancies (prior to T2DM diagnosis)  Single  Current partner is FOB of G2            T2DM, Polyhydramnios,  BMI 33:   NIPT low risk female, Carrier screening neg  now seeing Endocrine who is managing    Currently on insulin and Metformin--see meds     Hemoglobin A1c significantly elevated at 8.5 (22)    hgb A1C 6.4 (22)  Hgb A1C 5.8 (November)     Dexcom  S/p MFM preconception counseling (22)--see note  Has been counseled on OB ideal parameters   hgb A1C 5.7 at preg intake (22)  ASA 162mg      Baseline HELLP labs wnl  Baseline P:C ratio 0.03      2023 at [facility]: Appropriate growth; AC 30%, Overall 42%, DAVID 21.1cm, UA Dopplers WNL, BPP . No follow-up with [facility], refer back PRN  See DM2 Overview for Dexcom interpretation and recommendations. DM managed remotely by CDE with MFM. Continue twice weekly testing at [facility] office at 32 weeks.  prenatal expression can begin at 37 weeks.   Deliver at 39 weeks, sooner for maternal/fetal concerns      2x/wk testing:    NST cat I, R today     A few ctxs-- pt does not feel these    Needs updated Growth US on  along w/ BPP           Hx LTCS x1:  Desires repeat c/s   C/s scheduled for 23 at 39w0d w/ me            Mild anemia:  Hgb 10.8 (23)   Start iron/vit c QD or every other  --- states has not been taking   Hgb 10.8 (23)

## 2023-06-02 ENCOUNTER — ROUTINE PRENATAL (OUTPATIENT)
Dept: OBGYN CLINIC | Age: 33
End: 2023-06-02

## 2023-06-02 VITALS — WEIGHT: 218 LBS | SYSTOLIC BLOOD PRESSURE: 122 MMHG | BODY MASS INDEX: 35.19 KG/M2 | DIASTOLIC BLOOD PRESSURE: 76 MMHG

## 2023-06-02 DIAGNOSIS — E11.9 CONTROLLED TYPE 2 DIABETES MELLITUS WITHOUT COMPLICATION, WITHOUT LONG-TERM CURRENT USE OF INSULIN (HCC): ICD-10-CM

## 2023-06-02 DIAGNOSIS — O24.113 PREGNANCY WITH TYPE 2 DIABETES MELLITUS IN THIRD TRIMESTER: ICD-10-CM

## 2023-06-02 DIAGNOSIS — O09.03 ENCOUNTER FOR SUPERVISION OF HIGH-RISK PREGNANCY WITH HISTORY OF INFERTILITY IN THIRD TRIMESTER: Primary | ICD-10-CM

## 2023-06-02 DIAGNOSIS — O99.213 OBESITY AFFECTING PREGNANCY IN THIRD TRIMESTER: ICD-10-CM

## 2023-06-02 NOTE — PROGRESS NOTES
SAMMY. [id number].  36w3d   Denies LOF, VB, Ctxs. Good FM. Vitals:    06/02/23 0933   BP: 122/76        Encounter for supervision of high-risk pregnancy with history of infertility in third trimester  Kick counts and labor precautions reviewed   Growth today EFW 71, AC 88, DAVID 20     Controlled type 2 diabetes mellitus without complication, without long-term current use of insulin (HCC)  MFM following  BPP 8/8 today        Orders Placed This Encounter   Procedures    AMB POC US FETAL BIOPHYSICAL PROFILE W/O NON STRESS TESTING     Order Specific Question:   Are you Pregnant? Answer: Yes    AMB POC US OB RE-EVAL/FOLLOW UP     Order Specific Question:   Are you Pregnant? Answer:    Yes        Jazzmine Laurent, DO

## 2023-06-05 ENCOUNTER — ROUTINE PRENATAL (OUTPATIENT)
Dept: OBGYN CLINIC | Age: 33
End: 2023-06-05
Payer: COMMERCIAL

## 2023-06-05 ENCOUNTER — CLINICAL DOCUMENTATION (OUTPATIENT)
Dept: LABOR AND DELIVERY | Age: 33
End: 2023-06-05

## 2023-06-05 VITALS — DIASTOLIC BLOOD PRESSURE: 78 MMHG | SYSTOLIC BLOOD PRESSURE: 112 MMHG | WEIGHT: 220 LBS | BODY MASS INDEX: 35.51 KG/M2

## 2023-06-05 DIAGNOSIS — E28.2 POLYCYSTIC OVARY AFFECTING PREGNANCY, ANTEPARTUM: ICD-10-CM

## 2023-06-05 DIAGNOSIS — O09.03 ENCOUNTER FOR SUPERVISION OF HIGH-RISK PREGNANCY WITH HISTORY OF INFERTILITY IN THIRD TRIMESTER: Primary | ICD-10-CM

## 2023-06-05 DIAGNOSIS — O99.019 ANTEPARTUM ANEMIA: ICD-10-CM

## 2023-06-05 DIAGNOSIS — O99.213 OBESITY AFFECTING PREGNANCY IN THIRD TRIMESTER: ICD-10-CM

## 2023-06-05 DIAGNOSIS — Z98.891 HISTORY OF CESAREAN DELIVERY: ICD-10-CM

## 2023-06-05 DIAGNOSIS — Z97.8 USES SELF-APPLIED CONTINUOUS GLUCOSE MONITORING DEVICE: ICD-10-CM

## 2023-06-05 DIAGNOSIS — E11.9 CONTROLLED TYPE 2 DIABETES MELLITUS WITHOUT COMPLICATION, WITHOUT LONG-TERM CURRENT USE OF INSULIN (HCC): ICD-10-CM

## 2023-06-05 DIAGNOSIS — O34.80 POLYCYSTIC OVARY AFFECTING PREGNANCY, ANTEPARTUM: ICD-10-CM

## 2023-06-05 DIAGNOSIS — O99.013 ANEMIA DURING PREGNANCY IN THIRD TRIMESTER: ICD-10-CM

## 2023-06-05 DIAGNOSIS — O24.113 PREGNANCY WITH TYPE 2 DIABETES MELLITUS IN THIRD TRIMESTER: ICD-10-CM

## 2023-06-05 LAB
ERYTHROCYTE [DISTWIDTH] IN BLOOD BY AUTOMATED COUNT: 13.5 % (ref 11.9–14.6)
HCT VFR BLD AUTO: 34 % (ref 35.8–46.3)
HGB BLD-MCNC: 10.3 G/DL (ref 11.7–15.4)
MCH RBC QN AUTO: 26.1 PG (ref 26.1–32.9)
MCHC RBC AUTO-ENTMCNC: 30.3 G/DL (ref 31.4–35)
MCV RBC AUTO: 86.3 FL (ref 82–102)
NRBC # BLD: 0 K/UL (ref 0–0.2)
PLATELET # BLD AUTO: 256 K/UL (ref 150–450)
PMV BLD AUTO: 12.4 FL (ref 9.4–12.3)
RBC # BLD AUTO: 3.94 M/UL (ref 4.05–5.2)
WBC # BLD AUTO: 8.7 K/UL (ref 4.3–11.1)

## 2023-06-05 PROCEDURE — 99214 OFFICE O/P EST MOD 30 MIN: CPT | Performed by: OBSTETRICS & GYNECOLOGY

## 2023-06-05 ASSESSMENT — PATIENT HEALTH QUESTIONNAIRE - PHQ9
SUM OF ALL RESPONSES TO PHQ9 QUESTIONS 1 & 2: 0
2. FEELING DOWN, DEPRESSED OR HOPELESS: 0
SUM OF ALL RESPONSES TO PHQ QUESTIONS 1-9: 0
1. LITTLE INTEREST OR PLEASURE IN DOING THINGS: 0
SUM OF ALL RESPONSES TO PHQ QUESTIONS 1-9: 0

## 2023-06-05 NOTE — PROGRESS NOTES
MICHAEL [id number] 36w6d  Denies LOF, VB, RUCs. Good FM. GBS NEG   SVE:  pt defers        OB hx:  G1  2009 at 41w0d (7#)  G2 primary LTCS at 39wks on 17 w/ Dr Alfredo James due to FTP w/ NRFHTs; preg complicated by T9LM (insulin) w/ Polyhydramnios (7#15)     No issues conceiving 1st 2 pregnancies (prior to T2DM diagnosis)  Single  Current partner is FOB of G2            T2DM, Polyhydramnios,  BMI 33:   Hemoglobin A1c significantly elevated at 8.5 (22)    hgb A1C 6.4 (22)  Hgb A1C 5.8 (November)  Dexcom    2023 at [facility]: Appropriate growth; AC 30%, Overall 42%, DAVID 21.1cm, UA Dopplers WNL, BPP 8/8. No follow-up with [facility], refer back PRN  See DM2 Overview for Dexcom interpretation and recommendations. DM managed remotely by CDE with MFM. Continue twice weekly testing at [facility] office at 32 weeks.  prenatal expression can begin at 37 weeks.   Deliver at 44 weeks, sooner for maternal/fetal concerns    23 at [facility]:  Growth EFW 71, AC 88, DAVID 20       2x/wk testing:    NST cat I, R today                Hx LTCS x1:  Desires repeat c/s   C/s scheduled for 23 at 39w0d w/ me            Mild anemia:  Hgb 10.8 (23)   Start iron/vit c QD or every other  --- states has not been taking   Hgb 10.8 (23)   Repeat cbc today __

## 2023-06-06 ENCOUNTER — TELEPHONE (OUTPATIENT)
Dept: OBGYN CLINIC | Age: 33
End: 2023-06-06

## 2023-06-06 DIAGNOSIS — O24.113 PREGNANCY WITH TYPE 2 DIABETES MELLITUS IN THIRD TRIMESTER: ICD-10-CM

## 2023-06-06 DIAGNOSIS — Z97.8 USES SELF-APPLIED CONTINUOUS GLUCOSE MONITORING DEVICE: ICD-10-CM

## 2023-06-06 NOTE — TELEPHONE ENCOUNTER
CGM Reviewed. Contacted pt and advised them to continue current insulin dosing as stated in the DM2 A/P.    30 minutes spend on glucose interpretation and communication with patient by Frankie Stack. It is the standard of care that patients with diabetes in pregnancy have glycemic control monitored weekly. This may be done in person with the diabetic educator/physician or may occur virtually via email/[employer]. You may receive a bill from [address] [place] [address] for this service.

## 2023-06-06 NOTE — RESULT ENCOUNTER NOTE
Worsening anemia but has not been taking consistently  Start FeSO4 325mg QD-BID w/ Vit C 500 QD- BID     Colace/Miralax/magnesium oxide  for constipation     Recheck Hgb in a few weeks. If no improvement despite medication compliance will warrant further workup/treatment.

## 2023-06-06 NOTE — ASSESSMENT & PLAN NOTE
Glucose data  Dexcom G6    Reporting period: Wed May 24, 2023 - Tue Jun 6, 2023    Glucose Details  Average glucose: 101 mg/dL  Standard deviation: 21 mg/dL  GMI: N/A    Time in Range  Very High: <1%  High: 13%  In Range: 83%  Low: 3%  Very Low: 0%    Target Range  Day (6:00 AM - 10:00 PM):  mg/dL  Night (10:00 PM - 6:00 AM):  mg/dL    CGM Details  Sensor usage: 57%  Days with CGM data: 8/14    Pattern Interpretation  Improved control since last insulin adjustment. Improved ITR at goal. SD is stall WNL. Good control for pregnancy. Recommend continued current dosing at this time.

## 2023-06-06 NOTE — ASSESSMENT & PLAN NOTE
Based on Dexcom, continue current dosing of Insulin Glyargine-yfgn 34/42, Admelog 7/9/7, 10-12 units with heavier carb meals, 3 units with snacks. Hold Metformin at this time.

## 2023-06-08 ENCOUNTER — ROUTINE PRENATAL (OUTPATIENT)
Dept: OBGYN CLINIC | Age: 33
End: 2023-06-08
Payer: COMMERCIAL

## 2023-06-08 VITALS — BODY MASS INDEX: 35.19 KG/M2 | SYSTOLIC BLOOD PRESSURE: 118 MMHG | DIASTOLIC BLOOD PRESSURE: 70 MMHG | WEIGHT: 218 LBS

## 2023-06-08 DIAGNOSIS — O99.213 OBESITY AFFECTING PREGNANCY IN THIRD TRIMESTER: ICD-10-CM

## 2023-06-08 DIAGNOSIS — O99.013 ANEMIA DURING PREGNANCY IN THIRD TRIMESTER: ICD-10-CM

## 2023-06-08 DIAGNOSIS — O24.113 PREGNANCY WITH TYPE 2 DIABETES MELLITUS IN THIRD TRIMESTER: ICD-10-CM

## 2023-06-08 DIAGNOSIS — E11.9 CONTROLLED TYPE 2 DIABETES MELLITUS WITHOUT COMPLICATION, WITHOUT LONG-TERM CURRENT USE OF INSULIN (HCC): ICD-10-CM

## 2023-06-08 DIAGNOSIS — Z97.8 USES SELF-APPLIED CONTINUOUS GLUCOSE MONITORING DEVICE: Primary | ICD-10-CM

## 2023-06-08 DIAGNOSIS — O34.80 POLYCYSTIC OVARY AFFECTING PREGNANCY, ANTEPARTUM: ICD-10-CM

## 2023-06-08 DIAGNOSIS — Z98.891 HISTORY OF CESAREAN DELIVERY: ICD-10-CM

## 2023-06-08 DIAGNOSIS — O99.019 ANTEPARTUM ANEMIA: ICD-10-CM

## 2023-06-08 DIAGNOSIS — E28.2 POLYCYSTIC OVARY AFFECTING PREGNANCY, ANTEPARTUM: ICD-10-CM

## 2023-06-08 PROCEDURE — 76819 FETAL BIOPHYS PROFIL W/O NST: CPT | Performed by: OBSTETRICS & GYNECOLOGY

## 2023-06-08 PROCEDURE — 99214 OFFICE O/P EST MOD 30 MIN: CPT | Performed by: OBSTETRICS & GYNECOLOGY

## 2023-06-19 ENCOUNTER — HOSPITAL ENCOUNTER (OUTPATIENT)
Age: 33
Discharge: HOME | DRG: 540 | End: 2023-06-19
Attending: OBSTETRICS & GYNECOLOGY | Admitting: OBSTETRICS & GYNECOLOGY
Payer: COMMERCIAL

## 2023-06-19 ENCOUNTER — ROUTINE PRENATAL (OUTPATIENT)
Dept: OBGYN CLINIC | Age: 33
End: 2023-06-19
Payer: COMMERCIAL

## 2023-06-19 VITALS — WEIGHT: 221 LBS | DIASTOLIC BLOOD PRESSURE: 78 MMHG | BODY MASS INDEX: 35.67 KG/M2 | SYSTOLIC BLOOD PRESSURE: 118 MMHG

## 2023-06-19 DIAGNOSIS — O28.8 NON-REACTIVE NST (NON-STRESS TEST): ICD-10-CM

## 2023-06-19 DIAGNOSIS — O24.113 PREGNANCY WITH TYPE 2 DIABETES MELLITUS IN THIRD TRIMESTER: ICD-10-CM

## 2023-06-19 DIAGNOSIS — Z97.8 USES SELF-APPLIED CONTINUOUS GLUCOSE MONITORING DEVICE: ICD-10-CM

## 2023-06-19 DIAGNOSIS — Z98.891 HISTORY OF CESAREAN DELIVERY: ICD-10-CM

## 2023-06-19 DIAGNOSIS — E11.9 CONTROLLED TYPE 2 DIABETES MELLITUS WITHOUT COMPLICATION, WITHOUT LONG-TERM CURRENT USE OF INSULIN (HCC): ICD-10-CM

## 2023-06-19 DIAGNOSIS — O09.03 ENCOUNTER FOR SUPERVISION OF HIGH-RISK PREGNANCY WITH HISTORY OF INFERTILITY IN THIRD TRIMESTER: Primary | ICD-10-CM

## 2023-06-19 DIAGNOSIS — O99.019 ANTEPARTUM ANEMIA: ICD-10-CM

## 2023-06-19 PROBLEM — O24.414 INSULIN CONTROLLED GESTATIONAL DIABETES MELLITUS (GDM) IN THIRD TRIMESTER: Status: ACTIVE | Noted: 2023-06-19

## 2023-06-19 PROBLEM — O40.3XX0 POLYHYDRAMNIOS IN THIRD TRIMESTER: Status: ACTIVE | Noted: 2023-06-19

## 2023-06-19 PROCEDURE — 99281 EMR DPT VST MAYX REQ PHY/QHP: CPT

## 2023-06-19 PROCEDURE — 59025 FETAL NON-STRESS TEST: CPT | Performed by: OBSTETRICS & GYNECOLOGY

## 2023-06-19 PROCEDURE — 59025 FETAL NON-STRESS TEST: CPT

## 2023-06-19 PROCEDURE — 76818 FETAL BIOPHYS PROFILE W/NST: CPT | Performed by: OBSTETRICS & GYNECOLOGY

## 2023-06-19 PROCEDURE — 99214 OFFICE O/P EST MOD 30 MIN: CPT | Performed by: OBSTETRICS & GYNECOLOGY

## 2023-06-19 NOTE — PROGRESS NOTES
Dr. Evonne Khan at [facility]. [facility] reviewed. Instructed to monitor addition hour.

## 2023-06-19 NOTE — PROGRESS NOTES
Pt presents to [facility] ED from office for extended monitoring due to 6/8 BPP and NR NST.

## 2023-06-19 NOTE — PROGRESS NOTES
Pt discharged to home in stable condition. Reviewed instructions for NPO after MN and return to hospital at 0700 for scheduled C/S.

## 2023-06-19 NOTE — PROGRESS NOTES
SAMMY. [id number] 38w6d  Denies LOF, VB, Ctxs. Good FM. GBS NEG   SVE: deferred         OB hx:  G1  2009 at 41w0d (7#)  G2 primary LTCS at 39wks on 17 w/ Dr Shelly Pedroza due to FTP w/ NRFHTs; preg complicated by G8VO (insulin) w/ Polyhydramnios (7#15)     No issues conceiving 1st 2 pregnancies (prior to T2DM diagnosis)  Single  Current partner is FOB of G2            T2DM, Polyhydramnios,  BMI 33:   Hemoglobin A1c significantly elevated at 8.5 (22)    hgb A1C 6.4 (22)  Hgb A1C 5.8 (November)  Dexcom    2023 at [facility]: Appropriate growth; AC 30%, Overall 42%, DAVID 21.1cm, UA Dopplers WNL, BPP 8/8. No follow-up with [facility], refer back PRN  See DM2 Overview for Dexcom interpretation and recommendations. DM managed remotely by CDE with MFM. Continue twice weekly testing at [facility] office at 32 weeks.  prenatal expression can begin at 37 weeks.   Deliver at 39 weeks, sooner for maternal/fetal concerns    23 at [facility] ([place], [place]):  Growth EFW 71, AC 88, DAVID 20       2x/wk testing:  NST today Cat I but NR --> BPP 6/8 (off for breathing)   DAVID up today --26 (DVP 7.6) , c/w Polyhydramnios   Vertex       Discussed w/ Dr Angelina Barnett --- will send to triage for extended monitoring; if reassuring and reactive will DC w/ FU c/s in the morning          Hx LTCS x1:  Desires repeat c/s   C/s scheduled for 23 at 39w0d w/ me            Mild anemia:  Hgb 10.8 (23)   Start iron/vit c QD or every other  --- states has not been taking   Hgb 10.8 (23)   Hgb 10.3 (23) --Worsening anemia but pt has not been taking iron/vit c consistently; instructed to take FeSO4 325mg QD-BID w/ Vit C 500 QD- BID

## 2023-06-19 NOTE — PROGRESS NOTES
Pt sent for extended monitoring from office for BPP 6/8 (off for breathing)  Pt has A2DM and poly. Scheduled for repeat c/s oscar at 39wks    6/19/2023    Indication: see above  Time on: 1000  Time off: 1223  Baseline: 130  Reactive? Yes, multiple 15x15 accels  No decels  Contractions: irregular, not felt by pt. Home with plan for c/s oscar.

## 2023-06-20 ENCOUNTER — ANESTHESIA EVENT (OUTPATIENT)
Dept: OPERATING ROOM | Age: 33
End: 2023-06-20
Payer: COMMERCIAL

## 2023-06-20 ENCOUNTER — HOSPITAL ENCOUNTER (INPATIENT)
Age: 33
LOS: 3 days | Discharge: HOME | DRG: 540 | End: 2023-06-23
Attending: OBSTETRICS & GYNECOLOGY | Admitting: OBSTETRICS & GYNECOLOGY
Payer: COMMERCIAL

## 2023-06-20 ENCOUNTER — ANESTHESIA (OUTPATIENT)
Dept: OPERATING ROOM | Age: 33
End: 2023-06-20
Payer: COMMERCIAL

## 2023-06-20 DIAGNOSIS — O34.219 DELIVERED BY CESAREAN DELIVERY FOLLOWING PREVIOUS CESAREAN DELIVERY: Primary | ICD-10-CM

## 2023-06-20 PROBLEM — Z3A.39 39 WEEKS GESTATION OF PREGNANCY: Status: ACTIVE | Noted: 2023-06-20

## 2023-06-20 LAB
ABO + RH BLD: NORMAL
BLOOD GROUP ANTIBODIES SERPL: NORMAL
ERYTHROCYTE [DISTWIDTH] IN BLOOD BY AUTOMATED COUNT: 16.4 % (ref 11.9–14.6)
GLUCOSE BLD STRIP.AUTO-MCNC: 91 MG/DL (ref 65–100)
HCT VFR BLD AUTO: 34 % (ref 35.8–46.3)
HGB BLD-MCNC: 11.1 G/DL (ref 11.7–15.4)
MCH RBC QN AUTO: 27.3 PG (ref 26.1–32.9)
MCHC RBC AUTO-ENTMCNC: 32.6 G/DL (ref 31.4–35)
MCV RBC AUTO: 83.5 FL (ref 82–102)
NRBC # BLD: 0 K/UL (ref 0–0.2)
PLATELET # BLD AUTO: 247 K/UL (ref 150–450)
PMV BLD AUTO: 11.5 FL (ref 9.4–12.3)
RBC # BLD AUTO: 4.07 M/UL (ref 4.05–5.2)
SERVICE CMNT-IMP: NORMAL
SPECIMEN EXP DATE BLD: NORMAL
WBC # BLD AUTO: 9.8 K/UL (ref 4.3–11.1)

## 2023-06-20 PROCEDURE — 85027 COMPLETE CBC AUTOMATED: CPT

## 2023-06-20 PROCEDURE — 59514 CESAREAN DELIVERY ONLY: CPT | Performed by: OBSTETRICS & GYNECOLOGY

## 2023-06-20 PROCEDURE — 86850 RBC ANTIBODY SCREEN: CPT

## 2023-06-20 PROCEDURE — 2709999900 HC NON-CHARGEABLE SUPPLY: Performed by: OBSTETRICS & GYNECOLOGY

## 2023-06-20 PROCEDURE — 2500000003 HC RX 250 WO HCPCS: Performed by: STUDENT IN AN ORGANIZED HEALTH CARE EDUCATION/TRAINING PROGRAM

## 2023-06-20 PROCEDURE — 3609079900 HC CESAREAN SECTION: Performed by: OBSTETRICS & GYNECOLOGY

## 2023-06-20 PROCEDURE — 6370000000 HC RX 637 (ALT 250 FOR IP): Performed by: ANESTHESIOLOGY

## 2023-06-20 PROCEDURE — 6360000002 HC RX W HCPCS: Performed by: OBSTETRICS & GYNECOLOGY

## 2023-06-20 PROCEDURE — 3700000000 HC ANESTHESIA ATTENDED CARE: Performed by: OBSTETRICS & GYNECOLOGY

## 2023-06-20 PROCEDURE — 3700000001 HC ADD 15 MINUTES (ANESTHESIA): Performed by: OBSTETRICS & GYNECOLOGY

## 2023-06-20 PROCEDURE — 7100000001 HC PACU RECOVERY - ADDTL 15 MIN: Performed by: OBSTETRICS & GYNECOLOGY

## 2023-06-20 PROCEDURE — 7100000000 HC PACU RECOVERY - FIRST 15 MIN: Performed by: OBSTETRICS & GYNECOLOGY

## 2023-06-20 PROCEDURE — 86901 BLOOD TYPING SEROLOGIC RH(D): CPT

## 2023-06-20 PROCEDURE — 6360000002 HC RX W HCPCS: Performed by: ANESTHESIOLOGY

## 2023-06-20 PROCEDURE — 82962 GLUCOSE BLOOD TEST: CPT

## 2023-06-20 PROCEDURE — 2500000003 HC RX 250 WO HCPCS: Performed by: ANESTHESIOLOGY

## 2023-06-20 PROCEDURE — 6360000002 HC RX W HCPCS: Performed by: STUDENT IN AN ORGANIZED HEALTH CARE EDUCATION/TRAINING PROGRAM

## 2023-06-20 PROCEDURE — 1100000000 HC RM PRIVATE

## 2023-06-20 PROCEDURE — 86900 BLOOD TYPING SEROLOGIC ABO: CPT

## 2023-06-20 PROCEDURE — 2580000003 HC RX 258: Performed by: OBSTETRICS & GYNECOLOGY

## 2023-06-20 RX ORDER — INSULIN LISPRO 100 [IU]/ML
0-4 INJECTION, SOLUTION INTRAVENOUS; SUBCUTANEOUS
Status: DISCONTINUED | OUTPATIENT
Start: 2023-06-20 | End: 2023-06-20 | Stop reason: SDUPTHER

## 2023-06-20 RX ORDER — SODIUM CHLORIDE 9 MG/ML
INJECTION, SOLUTION INTRAVENOUS PRN
Status: DISCONTINUED | OUTPATIENT
Start: 2023-06-20 | End: 2023-06-23 | Stop reason: HOSPADM

## 2023-06-20 RX ORDER — SODIUM CHLORIDE 0.9 % (FLUSH) 0.9 %
5-40 SYRINGE (ML) INJECTION EVERY 12 HOURS SCHEDULED
Status: DISCONTINUED | OUTPATIENT
Start: 2023-06-20 | End: 2023-06-20

## 2023-06-20 RX ORDER — MORPHINE SULFATE 4 MG/ML
2 INJECTION, SOLUTION INTRAMUSCULAR; INTRAVENOUS
Status: DISCONTINUED | OUTPATIENT
Start: 2023-06-21 | End: 2023-06-23 | Stop reason: HOSPADM

## 2023-06-20 RX ORDER — ONDANSETRON 2 MG/ML
4 INJECTION INTRAMUSCULAR; INTRAVENOUS EVERY 6 HOURS PRN
Status: DISCONTINUED | OUTPATIENT
Start: 2023-06-20 | End: 2023-06-20

## 2023-06-20 RX ORDER — LANOLIN
CREAM (ML) TOPICAL
Status: DISCONTINUED | OUTPATIENT
Start: 2023-06-20 | End: 2023-06-23 | Stop reason: HOSPADM

## 2023-06-20 RX ORDER — ONDANSETRON 2 MG/ML
4 INJECTION INTRAMUSCULAR; INTRAVENOUS
Status: COMPLETED | OUTPATIENT
Start: 2023-06-20 | End: 2023-06-20

## 2023-06-20 RX ORDER — FAMOTIDINE 20 MG/1
20 TABLET, FILM COATED ORAL 2 TIMES DAILY
Status: DISCONTINUED | OUTPATIENT
Start: 2023-06-20 | End: 2023-06-23 | Stop reason: HOSPADM

## 2023-06-20 RX ORDER — BUPIVACAINE HYDROCHLORIDE 7.5 MG/ML
INJECTION, SOLUTION INTRASPINAL
Status: COMPLETED | OUTPATIENT
Start: 2023-06-20 | End: 2023-06-20

## 2023-06-20 RX ORDER — SODIUM CHLORIDE, SODIUM LACTATE, POTASSIUM CHLORIDE, CALCIUM CHLORIDE 600; 310; 30; 20 MG/100ML; MG/100ML; MG/100ML; MG/100ML
INJECTION, SOLUTION INTRAVENOUS CONTINUOUS
Status: DISCONTINUED | OUTPATIENT
Start: 2023-06-20 | End: 2023-06-20

## 2023-06-20 RX ORDER — INSULIN GLARGINE 100 [IU]/ML
14 INJECTION, SOLUTION SUBCUTANEOUS
Status: DISCONTINUED | OUTPATIENT
Start: 2023-06-21 | End: 2023-06-23 | Stop reason: HOSPADM

## 2023-06-20 RX ORDER — DEXTROSE MONOHYDRATE 100 MG/ML
INJECTION, SOLUTION INTRAVENOUS CONTINUOUS PRN
Status: DISCONTINUED | OUTPATIENT
Start: 2023-06-20 | End: 2023-06-23 | Stop reason: HOSPADM

## 2023-06-20 RX ORDER — ONDANSETRON 8 MG/1
8 TABLET, ORALLY DISINTEGRATING ORAL EVERY 8 HOURS PRN
Status: DISCONTINUED | OUTPATIENT
Start: 2023-06-20 | End: 2023-06-20

## 2023-06-20 RX ORDER — KETOROLAC TROMETHAMINE 30 MG/ML
INJECTION, SOLUTION INTRAMUSCULAR; INTRAVENOUS PRN
Status: DISCONTINUED | OUTPATIENT
Start: 2023-06-20 | End: 2023-06-20 | Stop reason: SDUPTHER

## 2023-06-20 RX ORDER — SODIUM CHLORIDE 0.9 % (FLUSH) 0.9 %
5-40 SYRINGE (ML) INJECTION EVERY 12 HOURS SCHEDULED
Status: DISCONTINUED | OUTPATIENT
Start: 2023-06-20 | End: 2023-06-23 | Stop reason: HOSPADM

## 2023-06-20 RX ORDER — NALBUPHINE HYDROCHLORIDE 10 MG/ML
5 INJECTION, SOLUTION INTRAMUSCULAR; INTRAVENOUS; SUBCUTANEOUS EVERY 6 HOURS PRN
Status: DISCONTINUED | OUTPATIENT
Start: 2023-06-20 | End: 2023-06-20

## 2023-06-20 RX ORDER — MORPHINE SULFATE 0.5 MG/ML
INJECTION, SOLUTION EPIDURAL; INTRATHECAL; INTRAVENOUS
Status: COMPLETED | OUTPATIENT
Start: 2023-06-20 | End: 2023-06-20

## 2023-06-20 RX ORDER — ACETAMINOPHEN 500 MG
1000 TABLET ORAL EVERY 8 HOURS PRN
Status: DISCONTINUED | OUTPATIENT
Start: 2023-06-21 | End: 2023-06-23 | Stop reason: HOSPADM

## 2023-06-20 RX ORDER — IBUPROFEN 800 MG/1
800 TABLET, FILM COATED ORAL EVERY 6 HOURS PRN
Status: DISCONTINUED | OUTPATIENT
Start: 2023-06-21 | End: 2023-06-21

## 2023-06-20 RX ORDER — HYDROMORPHONE HYDROCHLORIDE 1 MG/ML
0.5 INJECTION, SOLUTION INTRAMUSCULAR; INTRAVENOUS; SUBCUTANEOUS EVERY 4 HOURS PRN
Status: DISCONTINUED | OUTPATIENT
Start: 2023-06-20 | End: 2023-06-20

## 2023-06-20 RX ORDER — OXYCODONE HYDROCHLORIDE 5 MG/1
10 TABLET ORAL EVERY 4 HOURS PRN
Status: DISCONTINUED | OUTPATIENT
Start: 2023-06-21 | End: 2023-06-23 | Stop reason: HOSPADM

## 2023-06-20 RX ORDER — DEXTROSE, SODIUM CHLORIDE, SODIUM LACTATE, POTASSIUM CHLORIDE, AND CALCIUM CHLORIDE 5; .6; .31; .03; .02 G/100ML; G/100ML; G/100ML; G/100ML; G/100ML
INJECTION, SOLUTION INTRAVENOUS
Status: COMPLETED | OUTPATIENT
Start: 2023-06-20 | End: 2023-06-20

## 2023-06-20 RX ORDER — SODIUM CHLORIDE 9 MG/ML
INJECTION, SOLUTION INTRAVENOUS PRN
Status: DISCONTINUED | OUTPATIENT
Start: 2023-06-20 | End: 2023-06-20

## 2023-06-20 RX ORDER — HYDROMORPHONE HYDROCHLORIDE 1 MG/ML
1 INJECTION, SOLUTION INTRAMUSCULAR; INTRAVENOUS; SUBCUTANEOUS EVERY 4 HOURS PRN
Status: DISCONTINUED | OUTPATIENT
Start: 2023-06-20 | End: 2023-06-21 | Stop reason: ALTCHOICE

## 2023-06-20 RX ORDER — MORPHINE SULFATE 4 MG/ML
4 INJECTION, SOLUTION INTRAMUSCULAR; INTRAVENOUS
Status: DISCONTINUED | OUTPATIENT
Start: 2023-06-20 | End: 2023-06-20

## 2023-06-20 RX ORDER — KETOROLAC TROMETHAMINE 30 MG/ML
30 INJECTION, SOLUTION INTRAMUSCULAR; INTRAVENOUS EVERY 6 HOURS PRN
Status: DISCONTINUED | OUTPATIENT
Start: 2023-06-20 | End: 2023-06-20

## 2023-06-20 RX ORDER — CITRIC ACID/SODIUM CITRATE 334-500MG
30 SOLUTION, ORAL ORAL ONCE
Status: COMPLETED | OUTPATIENT
Start: 2023-06-20 | End: 2023-06-20

## 2023-06-20 RX ORDER — NALOXONE HYDROCHLORIDE 0.4 MG/ML
INJECTION, SOLUTION INTRAMUSCULAR; INTRAVENOUS; SUBCUTANEOUS PRN
Status: DISCONTINUED | OUTPATIENT
Start: 2023-06-20 | End: 2023-06-20

## 2023-06-20 RX ORDER — INSULIN LISPRO 100 [IU]/ML
0-4 INJECTION, SOLUTION INTRAVENOUS; SUBCUTANEOUS
Status: DISCONTINUED | OUTPATIENT
Start: 2023-06-20 | End: 2023-06-23 | Stop reason: HOSPADM

## 2023-06-20 RX ORDER — IBUPROFEN 800 MG/1
800 TABLET, FILM COATED ORAL EVERY 6 HOURS PRN
Status: DISCONTINUED | OUTPATIENT
Start: 2023-06-20 | End: 2023-06-20

## 2023-06-20 RX ORDER — METHYLERGONOVINE MALEATE 0.2 MG/ML
200 INJECTION INTRAVENOUS PRN
Status: DISCONTINUED | OUTPATIENT
Start: 2023-06-20 | End: 2023-06-23 | Stop reason: HOSPADM

## 2023-06-20 RX ORDER — EPHEDRINE SULFATE/0.9% NACL/PF 50 MG/5 ML
SYRINGE (ML) INTRAVENOUS PRN
Status: DISCONTINUED | OUTPATIENT
Start: 2023-06-20 | End: 2023-06-20 | Stop reason: SDUPTHER

## 2023-06-20 RX ORDER — MISOPROSTOL 200 UG/1
200 TABLET ORAL PRN
Status: DISCONTINUED | OUTPATIENT
Start: 2023-06-20 | End: 2023-06-23 | Stop reason: HOSPADM

## 2023-06-20 RX ORDER — SIMETHICONE 80 MG
80 TABLET,CHEWABLE ORAL EVERY 6 HOURS PRN
Status: DISCONTINUED | OUTPATIENT
Start: 2023-06-20 | End: 2023-06-23 | Stop reason: HOSPADM

## 2023-06-20 RX ORDER — HYDROMORPHONE HYDROCHLORIDE 1 MG/ML
0.5 INJECTION, SOLUTION INTRAMUSCULAR; INTRAVENOUS; SUBCUTANEOUS EVERY 4 HOURS PRN
Status: DISCONTINUED | OUTPATIENT
Start: 2023-06-20 | End: 2023-06-21 | Stop reason: ALTCHOICE

## 2023-06-20 RX ORDER — ONDANSETRON 2 MG/ML
4 INJECTION INTRAMUSCULAR; INTRAVENOUS EVERY 6 HOURS PRN
Status: DISCONTINUED | OUTPATIENT
Start: 2023-06-20 | End: 2023-06-21 | Stop reason: ALTCHOICE

## 2023-06-20 RX ORDER — DIPHENHYDRAMINE HYDROCHLORIDE 50 MG/ML
25 INJECTION INTRAMUSCULAR; INTRAVENOUS EVERY 6 HOURS PRN
Status: DISCONTINUED | OUTPATIENT
Start: 2023-06-21 | End: 2023-06-23 | Stop reason: HOSPADM

## 2023-06-20 RX ORDER — OXYCODONE HYDROCHLORIDE 5 MG/1
5 TABLET ORAL EVERY 4 HOURS PRN
Status: DISCONTINUED | OUTPATIENT
Start: 2023-06-20 | End: 2023-06-20

## 2023-06-20 RX ORDER — SODIUM CHLORIDE, SODIUM LACTATE, POTASSIUM CHLORIDE, AND CALCIUM CHLORIDE .6; .31; .03; .02 G/100ML; G/100ML; G/100ML; G/100ML
1000 INJECTION, SOLUTION INTRAVENOUS ONCE
Status: COMPLETED | OUTPATIENT
Start: 2023-06-20 | End: 2023-06-20

## 2023-06-20 RX ORDER — NALOXONE HYDROCHLORIDE 0.4 MG/ML
INJECTION, SOLUTION INTRAMUSCULAR; INTRAVENOUS; SUBCUTANEOUS PRN
Status: DISCONTINUED | OUTPATIENT
Start: 2023-06-20 | End: 2023-06-21 | Stop reason: ALTCHOICE

## 2023-06-20 RX ORDER — INSULIN GLARGINE 100 [IU]/ML
18 INJECTION, SOLUTION SUBCUTANEOUS NIGHTLY
Status: DISCONTINUED | OUTPATIENT
Start: 2023-06-20 | End: 2023-06-23 | Stop reason: HOSPADM

## 2023-06-20 RX ORDER — ACETAMINOPHEN 500 MG
1000 TABLET ORAL EVERY 8 HOURS PRN
Status: DISCONTINUED | OUTPATIENT
Start: 2023-06-20 | End: 2023-06-20

## 2023-06-20 RX ORDER — KETOROLAC TROMETHAMINE 30 MG/ML
30 INJECTION, SOLUTION INTRAMUSCULAR; INTRAVENOUS EVERY 6 HOURS PRN
Status: DISCONTINUED | OUTPATIENT
Start: 2023-06-20 | End: 2023-06-21 | Stop reason: ALTCHOICE

## 2023-06-20 RX ORDER — LIDOCAINE HYDROCHLORIDE 10 MG/ML
1 INJECTION, SOLUTION INFILTRATION; PERINEURAL
Status: DISCONTINUED | OUTPATIENT
Start: 2023-06-20 | End: 2023-06-20

## 2023-06-20 RX ORDER — SODIUM CHLORIDE 0.9 % (FLUSH) 0.9 %
5-40 SYRINGE (ML) INJECTION PRN
Status: DISCONTINUED | OUTPATIENT
Start: 2023-06-20 | End: 2023-06-20

## 2023-06-20 RX ORDER — SODIUM CHLORIDE, SODIUM LACTATE, POTASSIUM CHLORIDE, CALCIUM CHLORIDE 600; 310; 30; 20 MG/100ML; MG/100ML; MG/100ML; MG/100ML
INJECTION, SOLUTION INTRAVENOUS CONTINUOUS
Status: DISCONTINUED | OUTPATIENT
Start: 2023-06-20 | End: 2023-06-23 | Stop reason: HOSPADM

## 2023-06-20 RX ORDER — HYDROMORPHONE HYDROCHLORIDE 1 MG/ML
1 INJECTION, SOLUTION INTRAMUSCULAR; INTRAVENOUS; SUBCUTANEOUS EVERY 4 HOURS PRN
Status: DISCONTINUED | OUTPATIENT
Start: 2023-06-20 | End: 2023-06-20

## 2023-06-20 RX ORDER — SODIUM CHLORIDE 0.9 % (FLUSH) 0.9 %
5-40 SYRINGE (ML) INJECTION PRN
Status: DISCONTINUED | OUTPATIENT
Start: 2023-06-20 | End: 2023-06-23 | Stop reason: HOSPADM

## 2023-06-20 RX ORDER — ONDANSETRON 2 MG/ML
INJECTION INTRAMUSCULAR; INTRAVENOUS PRN
Status: DISCONTINUED | OUTPATIENT
Start: 2023-06-20 | End: 2023-06-20 | Stop reason: SDUPTHER

## 2023-06-20 RX ORDER — DIPHENHYDRAMINE HYDROCHLORIDE 50 MG/ML
25 INJECTION INTRAMUSCULAR; INTRAVENOUS EVERY 6 HOURS PRN
Status: DISCONTINUED | OUTPATIENT
Start: 2023-06-20 | End: 2023-06-20

## 2023-06-20 RX ORDER — OXYCODONE HYDROCHLORIDE 5 MG/1
10 TABLET ORAL EVERY 4 HOURS PRN
Status: DISCONTINUED | OUTPATIENT
Start: 2023-06-20 | End: 2023-06-20

## 2023-06-20 RX ORDER — OXYCODONE HYDROCHLORIDE 5 MG/1
5 TABLET ORAL EVERY 4 HOURS PRN
Status: DISCONTINUED | OUTPATIENT
Start: 2023-06-20 | End: 2023-06-21 | Stop reason: ALTCHOICE

## 2023-06-20 RX ORDER — MORPHINE SULFATE 4 MG/ML
2 INJECTION, SOLUTION INTRAMUSCULAR; INTRAVENOUS
Status: DISCONTINUED | OUTPATIENT
Start: 2023-06-20 | End: 2023-06-20

## 2023-06-20 RX ORDER — ONDANSETRON 8 MG/1
8 TABLET, ORALLY DISINTEGRATING ORAL EVERY 8 HOURS PRN
Status: DISCONTINUED | OUTPATIENT
Start: 2023-06-21 | End: 2023-06-23 | Stop reason: HOSPADM

## 2023-06-20 RX ORDER — MORPHINE SULFATE 4 MG/ML
4 INJECTION, SOLUTION INTRAMUSCULAR; INTRAVENOUS
Status: DISCONTINUED | OUTPATIENT
Start: 2023-06-21 | End: 2023-06-23 | Stop reason: HOSPADM

## 2023-06-20 RX ORDER — DOCUSATE SODIUM 100 MG/1
100 CAPSULE, LIQUID FILLED ORAL 2 TIMES DAILY
Status: DISCONTINUED | OUTPATIENT
Start: 2023-06-20 | End: 2023-06-23 | Stop reason: HOSPADM

## 2023-06-20 RX ORDER — ACETAMINOPHEN 500 MG
1000 TABLET ORAL EVERY 8 HOURS SCHEDULED
Status: COMPLETED | OUTPATIENT
Start: 2023-06-20 | End: 2023-06-21

## 2023-06-20 RX ORDER — ACETAMINOPHEN 500 MG
1000 TABLET ORAL EVERY 8 HOURS SCHEDULED
Status: DISCONTINUED | OUTPATIENT
Start: 2023-06-20 | End: 2023-06-20

## 2023-06-20 RX ADMIN — Medication 20 MG: at 11:28

## 2023-06-20 RX ADMIN — SODIUM CITRATE AND CITRIC ACID MONOHYDRATE 30 ML: 500; 334 SOLUTION ORAL at 10:23

## 2023-06-20 RX ADMIN — SODIUM CHLORIDE, SODIUM LACTATE, POTASSIUM CHLORIDE, AND CALCIUM CHLORIDE: 600; 310; 30; 20 INJECTION, SOLUTION INTRAVENOUS at 11:56

## 2023-06-20 RX ADMIN — ONDANSETRON 4 MG: 2 INJECTION INTRAMUSCULAR; INTRAVENOUS at 08:20

## 2023-06-20 RX ADMIN — SODIUM CHLORIDE, POTASSIUM CHLORIDE, SODIUM LACTATE AND CALCIUM CHLORIDE 1000 ML: 600; 310; 30; 20 INJECTION, SOLUTION INTRAVENOUS at 07:46

## 2023-06-20 RX ADMIN — Medication 15 MG: at 11:24

## 2023-06-20 RX ADMIN — CEFAZOLIN 2000 MG: 10 INJECTION, POWDER, FOR SOLUTION INTRAVENOUS at 10:23

## 2023-06-20 RX ADMIN — BUPIVACAINE HYDROCHLORIDE IN DEXTROSE 15 MG: 7.5 INJECTION, SOLUTION SUBARACHNOID at 11:16

## 2023-06-20 RX ADMIN — ONDANSETRON 4 MG: 2 INJECTION INTRAMUSCULAR; INTRAVENOUS at 11:30

## 2023-06-20 RX ADMIN — Medication 15 MG: at 11:58

## 2023-06-20 RX ADMIN — SODIUM CHLORIDE, SODIUM LACTATE, POTASSIUM CHLORIDE, CALCIUM CHLORIDE AND DEXTROSE MONOHYDRATE: 5; 600; 310; 30; 20 INJECTION, SOLUTION INTRAVENOUS at 10:26

## 2023-06-20 RX ADMIN — KETOROLAC TROMETHAMINE 30 MG: 30 INJECTION, SOLUTION INTRAMUSCULAR; INTRAVENOUS at 11:58

## 2023-06-20 RX ADMIN — SODIUM CHLORIDE, SODIUM LACTATE, POTASSIUM CHLORIDE, AND CALCIUM CHLORIDE: 600; 310; 30; 20 INJECTION, SOLUTION INTRAVENOUS at 10:13

## 2023-06-20 RX ADMIN — PHENYLEPHRINE HYDROCHLORIDE 150 MCG: 0.1 INJECTION, SOLUTION INTRAVENOUS at 11:44

## 2023-06-20 RX ADMIN — PHENYLEPHRINE HYDROCHLORIDE 100 MCG: 0.1 INJECTION, SOLUTION INTRAVENOUS at 11:22

## 2023-06-20 RX ADMIN — ACETAMINOPHEN 1000 MG: 500 TABLET, FILM COATED ORAL at 16:40

## 2023-06-20 RX ADMIN — KETOROLAC TROMETHAMINE 30 MG: 30 INJECTION, SOLUTION INTRAMUSCULAR; INTRAVENOUS at 21:14

## 2023-06-20 RX ADMIN — Medication 500 ML/HR: at 11:34

## 2023-06-20 RX ADMIN — PHENYLEPHRINE HYDROCHLORIDE 100 MCG: 0.1 INJECTION, SOLUTION INTRAVENOUS at 11:30

## 2023-06-20 RX ADMIN — PHENYLEPHRINE HYDROCHLORIDE 150 MCG: 0.1 INJECTION, SOLUTION INTRAVENOUS at 11:35

## 2023-06-20 RX ADMIN — MORPHINE SULFATE 0.15 MG: 0.5 INJECTION, SOLUTION EPIDURAL; INTRATHECAL; INTRAVENOUS at 11:16

## 2023-06-20 RX ADMIN — ONDANSETRON 4 MG: 2 INJECTION INTRAMUSCULAR; INTRAVENOUS at 17:54

## 2023-06-20 ASSESSMENT — PAIN - FUNCTIONAL ASSESSMENT: PAIN_FUNCTIONAL_ASSESSMENT: ACTIVITIES ARE NOT PREVENTED

## 2023-06-20 ASSESSMENT — PAIN SCALES - GENERAL: PAINLEVEL_OUTOF10: 4

## 2023-06-20 ASSESSMENT — PAIN DESCRIPTION - DESCRIPTORS: DESCRIPTORS: BURNING

## 2023-06-20 ASSESSMENT — PAIN DESCRIPTION - LOCATION: LOCATION: ABDOMEN;INCISION

## 2023-06-20 NOTE — PLAN OF CARE
Problem: Vaginal Birth or  Section  Goal: Fetal and maternal status remain reassuring during the birth process  Description:  Birth OB-Pregnancy care plan goal which identifies if the fetal and maternal status remain reassuring during the birth process  Outcome: Completed     Problem: Postpartum  Goal: Experiences normal postpartum course  Description:  Postpartum OB-Pregnancy care plan goal which identifies if the mother is experiencing a normal postpartum course  Outcome: Progressing  Goal: Appropriate maternal -  bonding  Description:  Postpartum OB-Pregnancy care plan goal which identifies if the mother and  are bonding appropriately  Outcome: Progressing  Goal: Establishment of infant feeding pattern  Description:  Postpartum OB-Pregnancy care plan goal which identifies if the mother is establishing a feeding pattern with their   Outcome: Progressing  Goal: Incisions, wounds, or drain sites healing without S/S of infection  Outcome: Progressing     Problem: Pain  Goal: Verbalizes/displays adequate comfort level or baseline comfort level  Outcome: Progressing  Flowsheets (Taken 2023 1840)  Verbalizes/displays adequate comfort level or baseline comfort level:   Encourage patient to monitor pain and request assistance   Assess pain using appropriate pain scale   Administer analgesics based on type and severity of pain and evaluate response   Implement non-pharmacological measures as appropriate and evaluate response     Problem: Infection - Adult  Goal: Absence of infection at discharge  Outcome: Progressing  Goal: Absence of infection during hospitalization  Outcome: Progressing  Goal: Absence of fever/infection during anticipated neutropenic period  Outcome: Completed     Problem: Safety - Adult  Goal: Free from fall injury  Outcome: Progressing     Problem: Discharge Planning  Goal: Discharge to home or other facility with appropriate resources  Outcome: Progressing

## 2023-06-20 NOTE — OP NOTE
Section Delivery Operative Report       Patient: Sam Johnson MRN: [id number]  SSN: [id number]    YOB: 1990  Age: 35 y.o. Sex: female       Date of Procedure:  2023    Preoperative Diagnosis:   34 yo   at 39w0d with prior c/s x1, well controlled T2DM w/ mild polyhydramnios     Postoperative Diagnosis:   NARCISO     Procedure:  repeat LTCS     Surgeon(s):  Ely Marion MD    Indication:   as above    Anesthesia: Spinal    Prophylactic Antibiotics:  Ancef    QBL per RN     Information for the patient's :  Caren Guerrero Girl Supriya Schulte [[id number]]   @[id number]@      Specimens: * No specimens in log *            Complications:  none     Findings:  VFI at 1133 on 23, APGARs 8, 9. Moderate adhesions of rectus mm to fascia and rectus mm together at midline. Otherwise relatively few intraperitoneal adhesions. Normal appearing uterus, tubes, ovaries. Procedure Details:    Patient was taken to the OR after informed consent had been obtained. After anesthesia was found to be adequate, she was then placed in a dorsal supine position with a left lateral tilt. She was then prepped and draped in a sterile fashion. Time out was completed. Attention was turned to the abdomen and an Allis test confirmed adequate anesthesia. A Pfannenstiel skin incision was made with the scalpel down to the fascia. The fascia was knicked in the midline. The incision was extended laterally using maldonado scissors. The cephalad fascia was grasped with kochers and the rectus muscles  off both bluntly and sharply. The process was repeated on the inferior fascia. Rectus muscles were  bluntly. The peritoneum was entered using Metzenbaum scissors and the incision was extended laterally bluntly. The bladder blade was inserted. A low transverse  semi lunar transverse incision was made on the uterus in the midline.   The uterine incision was extended laterally

## 2023-06-20 NOTE — ANESTHESIA POSTPROCEDURE EVALUATION
Department of Anesthesiology  Postprocedure Note    Patient: Debbie Nielson  MRN: [id number]  YOB: 1990  Date of evaluation: 2023      Procedure Summary     Date: 23 Room / Location: [facility]  [facility]    Anesthesia Start: 1102 Anesthesia Stop: 1214    Procedure:  SECTION (Abdomen) Diagnosis:       History of       (repeat; Type I diabetic)    Surgeons: Lacie Bell MD Responsible Provider: Wilson Jacobson DO    Anesthesia Type: spinal ASA Status: 2          Anesthesia Type: No value filed.     Jo-Ann Phase I: Jo-Ann Score: 9    Jo-Ann Phase II:        Anesthesia Post Evaluation    Patient location during evaluation: floor  Level of consciousness: awake and alert  Airway patency: patent  Nausea & Vomiting: no nausea  Complications: no  Cardiovascular status: hemodynamically stable  Respiratory status: acceptable  Hydration status: euvolemic

## 2023-06-20 NOTE — LACTATION NOTE

## 2023-06-20 NOTE — LACTATION NOTE
This note was copied from a baby's chart. Noted pumping due to no latch and low blood sugar. Mom reports baby attempted at delivery. Baby currently sleeping. Tried 1 hour ago. Reviewed first 24 hour expectations. Discussed feeding expectations in second day. Encouraged to try at breast.  Reviewed Breastfeeding Packet. Put baby skin to skin and encouraged to try again at breast.  Plan to assist with feeding prior to discharge. Encouraged skin to skin. If not latching consistently continue pumping and supplement as indicated.

## 2023-06-20 NOTE — H&P
Department of Obstetrics and Gynecology   Obstetrics History and Physical        HISTORY OF PRESENT ILLNESS:      Sam Johnson  is a 35 y.o. Clearence Narrow  at 39w0d with well controlled T2DM w/ mild polyhdramnios admitted for planned repeat  section. Last growth US (23) at [facility]:  Growth EFW 71%, AC 88%, DAVID 20. Repeat BPP yesterday w/ DAVID 26. Prenatal course has been complicated by the above as well as hx prior LTCS x1,  BMI 33, and mild anemia. Denies lof, vb, RUCs. Good fm.           OB History    Para Term  AB Living   3 2 2 0 0 2   SAB IAB Ectopic Molar Multiple Live Births   0 0 0 0 0 2      # Outcome Date GA Lbr Corey/2nd Weight Sex Delivery Anes PTL Lv   3 Current            2 Term 17 39w1d  7 lb 14.6 oz (3.59 kg) M CS-LTranv  N MITZI      Complications: Failure to Progress in First Stage   1 Term 09 41w0d  7 lb (3.175 kg) F Vag-Spont EPI N MITZI              Past Medical History:    Past Medical History:   Diagnosis Date    Anemia 2009    in pregnancy    COVID-19 vaccine series declined     History of gestational diabetes     Infertility associated with anovulation     Irregular menstrual bleeding     Nephrolithiasis     PCOS (polycystic ovarian syndrome)     Poor dentition     Type 2 diabetes mellitus (HCC)     on oral and insulin, avg fbs 145, A1C 6.4 on 22         Past Surgical History:    Past Surgical History:   Procedure Laterality Date    CERVICAL POLYP REMOVAL  2022     SECTION      DILATION AND CURETTAGE OF UTERUS N/A 2022    DILATATION AND CURETTAGE HYSTEROSCOPY performed by Ely Marion MD at [facility]    LITHOTRIPSY  05/10/2022    ORTHOPEDIC SURGERY Left     Left knee         Problem List:  Patient Active Problem List   Diagnosis    History of gestational diabetes    Pregnancy with type 2 diabetes mellitus in third trimester    Polycystic ovary affecting pregnancy, antepartum    Encounter for supervision of high-risk

## 2023-06-20 NOTE — ANESTHESIA PRE PROCEDURE
Department of Anesthesiology  Preprocedure Note       Name:  Isaiah Bejarano   Age:  35 y.o.  :  1990                                          MRN:  [id number]         Date:  2023      Surgeon: Mayank Ram):  Cesar Ingram MD    Procedure: Procedure(s):   SECTION    Medications prior to admission:   Prior to Admission medications    Medication Sig Start Date End Date Taking?  Authorizing Provider   ferrous sulfate (IRON 325) 325 (65 Fe) MG tablet Take 1 tablet by mouth daily (with breakfast) 23   Cesar Ingram MD   vitamin C (ASCORBIC ACID) 500 MG tablet Take 1 tablet by mouth daily 23   Cesar Ingram MD   ADMELOG SOLOSTAR 100 UNIT/ML SOPN 3-4 units before meals, 2 unit before snacks plus correction 1 per 25 over 125 AC/HS, max daily dose 30 units 22   Krystal Corrales PA-C   blood glucose test strips (ACCU-CHEK GUIDE) strip Check glucose and calibrate CGM up to twice daily E11.65 22   Krystal Corrales PA-C   B-D UF III MINI PEN NEEDLES 31G X 5 MM MISC Use with insulin up to 5 time daily E11.22   Krystal Corrales PA-C   Insulin Glargine-yfgn 100 UNIT/ML SOPN 18 in am, 24 in pm 22   Krystal Corrales PA-C   Continuous Blood Gluc Transmit (DEXCOM G6 TRANSMITTER) MISC Use to monitor blood glucose, E11.65 11/3/22   Dixon Silva PA-C   Accu-Chek Softclix Lancets MISC Use 4x daily to test BG. 11/3/22   Dixon Silva PA-C   Prenatal Vit-Fe Fumarate-FA (PRENATAL VITAMIN PO) Take by mouth    Historical Provider, MD       Current medications:    Current Facility-Administered Medications   Medication Dose Route Frequency Provider Last Rate Last Admin    lactated ringers IV soln infusion   IntraVENous Continuous Cesar Ingram MD        ceFAZolin (ANCEF) 2000 mg in sterile water 20 mL IV syringe  2,000 mg IntraVENous Once Cesar Ingram MD        lidocaine 1 % injection 1 mL  1 mL IntraDERmal Once PRN Nguyen Jose Angele Paralee Brought, DO        citric

## 2023-06-20 NOTE — PROGRESS NOTES
Admitted for scheduled repeat  section. Admission assessment as documented. Oriented to room, call system and pain scale. Plan of care reviewed and questions answered. Consents signed, identification band verified and placed. IV placed, blood work drawn per order and sent to lab. Dr. Víctor Wild at bedside. Pt dexcom 93 glucometer 91. MD ok to use dexcom for BS. OR case to be delayed due to other pt situation.

## 2023-06-20 NOTE — ANESTHESIA PROCEDURE NOTES
Spinal Block    Patient location during procedure: OR  End time: 6/20/2023 11:16 AM  Reason for block: primary anesthetic and at surgeon's request  Staffing  Performed: anesthesiologist   Anesthesiologist: Will Ibanez, DO  Spinal Block  Patient position: sitting  Prep: ChloraPrep  Patient monitoring: cardiac monitor, continuous pulse ox, continuous capnometry, frequent blood pressure checks and oxygen  Approach: midline  Location: L3/L4  Provider prep: mask and sterile gloves  Local infiltration: lidocaine  Needle  Needle type:  Eduardo   Needle gauge: 25 G  Needle length: 3.5 in  Assessment  Swirl obtained: Yes  CSF: clear  Attempts: 1  Hemodynamics: stable  Additional Notes  Uneventful first pass spinal placement  Preanesthetic Checklist  Completed: patient identified, IV checked, site marked, risks and benefits discussed, surgical/procedural consents, equipment checked, pre-op evaluation, timeout performed, anesthesia consent given, oxygen available and monitors applied/VS acknowledged

## 2023-06-20 NOTE — L&D DELIVERY NOTE
Joshua Mace [[id number]]      Labor Events     Labor: No   Steroids: None  Cervical Ripening Date/Time:      Antibiotics Received during Labor: No  Rupture Identifier: Sac 1  Rupture Date/Time:  23 11:32:57   Rupture Type: AROM  Fluid Color: Clear  Fluid Odor: None  Fluid Volume:  Moderate  Induction: None  Augmentation: None  Labor Complications: None              Anesthesia    Method: Spinal       Labor Length    3rd stage: 0h 02m       Delivery Details      Delivery Date: 23 Delivery Time: 11:33:36   Delivery Type: , Low Transverse  Trial of Labor?: No   Categorization: Repeat   Priority: Scheduled  Indications for : Prior Uterine Surgery       Skin Incision Type: Pfannenstiel  Uterine Incision: Low Transverse        Presentation    Presentation: Vertex       Shoulder Dystocia    Shoulder Dystocia Present?: No       Assisted Delivery Details    Forceps Attempted?: No  Vacuum Extractor Attempted?: No                           Cord    Vessels: 3 Vessels  Complications: Nuchal Loose  Cord Around: Shoulders  Delayed Cord Clamping?: Yes  Cord Clamped Date/Time: 2023 11:33:00  Cord Blood Disposition: Lab  Gases Sent?: No   Cord Comments:  PROCEDURAL - OBSTETRIC - CORD OBSERVATION   true Knot             Placenta    Date/Time: 2023 11:36:10  Removal: Expressed  Appearance: Intact  Disposition: Discarded       Lacerations           Vaginal Counts    Initial Count Personnel: NA  Final Count Personnel: NA       Blood Loss  Mother: Salma Richard #[id number]     Start of Mother's Information      Delivery Blood Loss  23 1109 - 23 1216      Quantitative Blood Loss (mL) Hospital Encounter 90 grams    Total  90 mL               End of Mother's Information  Mother: Salma Richard #[id number]                Delivery Providers    Delivering clinician: Jem Rogers MD     Provider Role    Jem oRgers MD Patient Information     Patient Name MRN Sergio Callahan [id number] Female 2004      Nursing Note by Dasia Echavarria at 2/10/2017  9:15 AM     Author:  Dasia Echavarria Service:  (none) Author Type:  (none)     Filed:  2/10/2017  9:21 AM Encounter Date:  2/10/2017 Status:  Signed     :  Dasia Echavarria            Fever,cough and vomiting times one week. 103.4 temp today   Dasia Echavarria ....................  2/10/2017   9:17 AM

## 2023-06-21 LAB
HCT VFR BLD AUTO: 32.6 % (ref 35.8–46.3)
HGB BLD-MCNC: 10 G/DL (ref 11.7–15.4)

## 2023-06-21 PROCEDURE — 51701 INSERT BLADDER CATHETER: CPT

## 2023-06-21 PROCEDURE — 36415 COLL VENOUS BLD VENIPUNCTURE: CPT

## 2023-06-21 PROCEDURE — 6360000002 HC RX W HCPCS: Performed by: ANESTHESIOLOGY

## 2023-06-21 PROCEDURE — 1100000000 HC RM PRIVATE

## 2023-06-21 PROCEDURE — 6370000000 HC RX 637 (ALT 250 FOR IP): Performed by: ANESTHESIOLOGY

## 2023-06-21 PROCEDURE — 85014 HEMATOCRIT: CPT

## 2023-06-21 PROCEDURE — 6370000000 HC RX 637 (ALT 250 FOR IP): Performed by: OBSTETRICS & GYNECOLOGY

## 2023-06-21 PROCEDURE — 85018 HEMOGLOBIN: CPT

## 2023-06-21 RX ORDER — IBUPROFEN 800 MG/1
800 TABLET, FILM COATED ORAL EVERY 6 HOURS PRN
Status: DISCONTINUED | OUTPATIENT
Start: 2023-06-21 | End: 2023-06-23 | Stop reason: HOSPADM

## 2023-06-21 RX ORDER — OXYCODONE HYDROCHLORIDE 5 MG/1
5 TABLET ORAL EVERY 4 HOURS PRN
Status: DISCONTINUED | OUTPATIENT
Start: 2023-06-21 | End: 2023-06-23 | Stop reason: HOSPADM

## 2023-06-21 RX ADMIN — ACETAMINOPHEN 1000 MG: 500 TABLET, FILM COATED ORAL at 03:24

## 2023-06-21 RX ADMIN — INSULIN GLARGINE 18 UNITS: 100 INJECTION, SOLUTION SUBCUTANEOUS at 00:45

## 2023-06-21 RX ADMIN — OXYCODONE HYDROCHLORIDE 5 MG: 5 TABLET ORAL at 12:45

## 2023-06-21 RX ADMIN — ACETAMINOPHEN 1000 MG: 500 TABLET, FILM COATED ORAL at 11:01

## 2023-06-21 RX ADMIN — OXYCODONE HYDROCHLORIDE 5 MG: 5 TABLET ORAL at 00:07

## 2023-06-21 RX ADMIN — KETOROLAC TROMETHAMINE 30 MG: 30 INJECTION, SOLUTION INTRAMUSCULAR; INTRAVENOUS at 05:33

## 2023-06-21 RX ADMIN — IBUPROFEN 800 MG: 800 TABLET, FILM COATED ORAL at 14:12

## 2023-06-21 RX ADMIN — DOCUSATE SODIUM 100 MG: 100 CAPSULE, LIQUID FILLED ORAL at 20:29

## 2023-06-21 RX ADMIN — ACETAMINOPHEN 1000 MG: 500 TABLET, FILM COATED ORAL at 18:39

## 2023-06-21 RX ADMIN — INSULIN GLARGINE 14 UNITS: 100 INJECTION, SOLUTION SUBCUTANEOUS at 07:54

## 2023-06-21 RX ADMIN — DOCUSATE SODIUM 100 MG: 100 CAPSULE, LIQUID FILLED ORAL at 07:54

## 2023-06-21 RX ADMIN — IBUPROFEN 800 MG: 800 TABLET, FILM COATED ORAL at 20:30

## 2023-06-21 ASSESSMENT — PAIN - FUNCTIONAL ASSESSMENT
PAIN_FUNCTIONAL_ASSESSMENT: ACTIVITIES ARE NOT PREVENTED

## 2023-06-21 ASSESSMENT — PAIN DESCRIPTION - LOCATION
LOCATION: ABDOMEN
LOCATION: ABDOMEN;INCISION
LOCATION: ABDOMEN
LOCATION: ABDOMEN;PERINEUM

## 2023-06-21 ASSESSMENT — PAIN DESCRIPTION - DESCRIPTORS
DESCRIPTORS: DISCOMFORT;BURNING
DESCRIPTORS: SORE
DESCRIPTORS: SORE
DESCRIPTORS: SORE;ACHING

## 2023-06-21 ASSESSMENT — PAIN SCALES - GENERAL
PAINLEVEL_OUTOF10: 2
PAINLEVEL_OUTOF10: 6
PAINLEVEL_OUTOF10: 5
PAINLEVEL_OUTOF10: 6
PAINLEVEL_OUTOF10: 5

## 2023-06-21 ASSESSMENT — PAIN DESCRIPTION - ORIENTATION: ORIENTATION: ANTERIOR;LOWER

## 2023-06-21 NOTE — PROGRESS NOTES
Telephone orders received. Per Dr. Goetz Layer pt can have an order for oxycodone 5mg1 tab every 4hr prn pain, orders read back and verified.

## 2023-06-21 NOTE — PROGRESS NOTES
Progress Note                               Patient: Heber Edgar MRN: [id number]  SSN: [id number]    YOB: 1990  Age: 35 y.o. Sex: female      Postpartum Day Number 1 Day Post-Op     Subjective:     Patient doing well without significant complaints. Ambulating, voiding without difficulty Patient reports normal lochia. Objective:     Patient Vitals for the past 18 hrs:   Temp Pulse Resp BP SpO2   23 1650 98.3 °F (36.8 °C) 62 17 109/81 98 %   23 1125 98.3 °F (36.8 °C) 62 17 109/81 98 %   23 0759 97.9 °F (36.6 °C) 60 18 105/64 99 %        Temp (24hrs), Av.1 °F (36.7 °C), Min:97.8 °F (36.6 °C), Max:98.3 °F (36.8 °C)      Physical Exam:    Patient without distress. Neuro / Psych grossly WNL, A&O X 3    Lab/Data Review: All lab results for the last 24 hours reviewed. CBC:    Recent Labs     23  0738 23  0618   WBC 9.8  --    HGB 11.1* 10.0*   HCT 34.0* 32.6*     --         Assessment and Plan:     Patient appears to be having an uncomplicated postpartum course. Continue routine post op care and maternal education.     Marcelle Lewis MD

## 2023-06-21 NOTE — PROGRESS NOTES
Shift assessment complete see flowsheet. Discussed today plan of care with pt. Bleeding precautions given. Pt educated to ambulate in hallway today. No s/s of distress noted. BS 84 via Dexcom. Questions encouraged and answered. Pt to call with needs/concerns. Pt in bed with call light in reach.

## 2023-06-21 NOTE — LACTATION NOTE
This note was copied from a baby's chart. In to see mom and infant for follow up. Assisted mom w/ her trying to latch baby to right breast in football hold but baby only took a few non nutritive sucks, nothing sustained. Measured mom's nipples and changed her to size 21 mm flange. While mom pumped, lactation fed back baby 20 mls of formula via curve tip syringe. Mom to save ahead whatever milk she gets from pumping this session, to give next feed after trying at breast. Encouraged to still keep supplementing baby after feeds today with EBM and formula as needed, until her milk further in and baby doing more consistently well at breast. Mom verbalized understanding. No further needs at this time.

## 2023-06-21 NOTE — ANESTHESIA POST-OP
Anesthesiology  Post-op Note    Post-op day 1 s/p  via spinal with neuraxial opioids for post-op pain management. /63   Pulse 62   Temp 97.8 °F (36.6 °C)   Resp 16   LMP 2022 (Approximate)   SpO2 95%   Breastfeeding Unknown   Airway patent, patient appropriately hydrated and appears euvolemic. Patient is Alert and oriented. Pain is well controlled. Pruritus is absent. Nausea is absent. No complaints about back or site of injection. Motor and sensory function has returned to baseline in lower extremities. Patient is satisfied with anesthetic and reports no complications. Continue current orders, then initiate surgeon's orders for pain management 24 hours after . Follow up per surgeon.

## 2023-06-21 NOTE — PROGRESS NOTES
Pt attempted to void 2x. Fundus +1 and pt states her bladder feeling full. Dr. Natalia José called and order received for 1x straight cath. Straight cath performed with help of Shawna Swann RN. 650 of clear yellow urine out. Fundus down to U/U. Questions and concerns answered and pt to call with further questions.

## 2023-06-22 PROCEDURE — 1100000000 HC RM PRIVATE

## 2023-06-22 PROCEDURE — 6370000000 HC RX 637 (ALT 250 FOR IP): Performed by: OBSTETRICS & GYNECOLOGY

## 2023-06-22 RX ADMIN — ACETAMINOPHEN 1000 MG: 500 TABLET, FILM COATED ORAL at 13:18

## 2023-06-22 RX ADMIN — ACETAMINOPHEN 1000 MG: 500 TABLET, FILM COATED ORAL at 05:48

## 2023-06-22 RX ADMIN — INSULIN GLARGINE 14 UNITS: 100 INJECTION, SOLUTION SUBCUTANEOUS at 08:13

## 2023-06-22 RX ADMIN — INSULIN GLARGINE 18 UNITS: 100 INJECTION, SOLUTION SUBCUTANEOUS at 21:20

## 2023-06-22 RX ADMIN — OXYCODONE HYDROCHLORIDE 5 MG: 5 TABLET ORAL at 21:18

## 2023-06-22 RX ADMIN — OXYCODONE HYDROCHLORIDE 5 MG: 5 TABLET ORAL at 01:14

## 2023-06-22 RX ADMIN — DOCUSATE SODIUM 100 MG: 100 CAPSULE, LIQUID FILLED ORAL at 21:19

## 2023-06-22 RX ADMIN — DOCUSATE SODIUM 100 MG: 100 CAPSULE, LIQUID FILLED ORAL at 08:13

## 2023-06-22 RX ADMIN — OXYCODONE HYDROCHLORIDE 5 MG: 5 TABLET ORAL at 12:00

## 2023-06-22 RX ADMIN — OXYCODONE HYDROCHLORIDE 5 MG: 5 TABLET ORAL at 07:05

## 2023-06-22 RX ADMIN — IBUPROFEN 800 MG: 800 TABLET, FILM COATED ORAL at 15:04

## 2023-06-22 RX ADMIN — IBUPROFEN 800 MG: 800 TABLET, FILM COATED ORAL at 08:13

## 2023-06-22 ASSESSMENT — PAIN DESCRIPTION - DESCRIPTORS
DESCRIPTORS: SORE
DESCRIPTORS: BURNING

## 2023-06-22 ASSESSMENT — PAIN - FUNCTIONAL ASSESSMENT
PAIN_FUNCTIONAL_ASSESSMENT: ACTIVITIES ARE NOT PREVENTED

## 2023-06-22 ASSESSMENT — PAIN SCALES - GENERAL
PAINLEVEL_OUTOF10: 5

## 2023-06-22 ASSESSMENT — PAIN DESCRIPTION - LOCATION
LOCATION: ABDOMEN

## 2023-06-22 NOTE — LACTATION NOTE
This note was copied from a baby's chart. In to follow up with mom and infant. Mom stated that she has continued to pump and has expressed up to 25 ml. She has been sporadically placing infant to breast if she shows any hunger cues. Mom stated that she has no concerns at this time. Lactation consultant will follow up tomorrow.

## 2023-06-22 NOTE — PROGRESS NOTES
Progress Note                               Patient: Aj Olguin MRN: [id number]  SSN: [id number]    YOB: 1990  Age: 35 y.o. Sex: female      Postpartum Day Number 2 Days Post-Op     Subjective:     Patient doing well without significant complaints. Ambulating, voiding without difficulty and sugars are good  Patient reports normal lochia. . Infant: Breastfeeding and also supplementing with formula    Objective:     Patient Vitals for the past 18 hrs:   Temp Pulse Resp BP SpO2   23 0823 97.6 °F (36.4 °C) 77 16 136/79 97 %   23 0705 -- -- 16 -- --   23 0114 -- -- 16 -- --   23 2333 98.2 °F (36.8 °C) 77 18 111/69 95 %   23 1650 98.3 °F (36.8 °C) 62 17 109/81 98 %        Temp (24hrs), Av.1 °F (36.7 °C), Min:97.6 °F (36.4 °C), Max:98.3 °F (36.8 °C)      Physical Exam:    Patient without distress. Neuro / Psych grossly WNL, A&O X 3, Abdomen: Abdomen appropriately tender, Fundus Firm, without erythema, without hematoma, without evidence of infection, and per RN (Patient up ambulating at time of visit, bed occupied)    Lab/Data Review: All lab results for the last 24 hours reviewed. CBC:    Recent Labs     23  0738 23  0618   WBC 9.8  --    HGB 11.1* 10.0*   HCT 34.0* 32.6*     --      Blood Type:   Lab Results   Component Value Date/Time    ABORH A POSITIVE 2023 07:38 AM         Assessment and Plan:     Patient appears to be having an uncomplicated postpartum course. Continue routine perineal care and maternal education.     Radha Bauman MD

## 2023-06-22 NOTE — PROGRESS NOTES
26- this RN informed by charge nurse Julian Haq RN that pt  came to desk c/o pt feeling like her BS was low. Pt BS 72, juice given to pt and crackdayna,     1622-this RN at bedside, pt states she had felt \"Hot and hungry\" earlier and that her BS had dropped to 69. Pt states she ate chips and had apple juice. Current  via Dexcom. Pt states she feels better at this time. No s/s of distress noted.

## 2023-06-22 NOTE — CARE COORDINATION
CM chart review; no needs identified. RN KATHY met w/ patient at bedside for assessment. Patient denies any hx of anxiety/depression. She was given informational packet on  mood & anxiety disorders (resources/education). Family denies any additional needs from  at this time. Family has case management contact information should any needs/questions arise.

## 2023-06-22 NOTE — PROGRESS NOTES
Shift assessment complete see flowsheet. Discussed today plan of care with pt. Bleeding precautions given. No s/s of distress noted. Questions encouraged and answered. Pt to call with needs/concerns. Pt in bed with call light in reach.

## 2023-06-23 VITALS
DIASTOLIC BLOOD PRESSURE: 81 MMHG | RESPIRATION RATE: 17 BRPM | SYSTOLIC BLOOD PRESSURE: 114 MMHG | HEART RATE: 64 BPM | TEMPERATURE: 98.2 F | OXYGEN SATURATION: 97 %

## 2023-06-23 PROCEDURE — 6370000000 HC RX 637 (ALT 250 FOR IP): Performed by: OBSTETRICS & GYNECOLOGY

## 2023-06-23 RX ORDER — IBUPROFEN 600 MG/1
600 TABLET, FILM COATED ORAL EVERY 6 HOURS PRN
Qty: 60 TABLET | Refills: 0 | Status: SHIPPED | OUTPATIENT
Start: 2023-06-23

## 2023-06-23 RX ORDER — OXYCODONE AND ACETAMINOPHEN 5; 325 MG/1; MG/1
1 TABLET ORAL EVERY 6 HOURS PRN
Qty: 28 TABLET | Refills: 0 | Status: SHIPPED | OUTPATIENT
Start: 2023-06-23 | End: 2023-06-30

## 2023-06-23 RX ADMIN — ACETAMINOPHEN 1000 MG: 500 TABLET, FILM COATED ORAL at 00:52

## 2023-06-23 RX ADMIN — IBUPROFEN 800 MG: 800 TABLET, FILM COATED ORAL at 06:10

## 2023-06-23 RX ADMIN — IBUPROFEN 800 MG: 800 TABLET, FILM COATED ORAL at 13:11

## 2023-06-23 RX ADMIN — DOCUSATE SODIUM 100 MG: 100 CAPSULE, LIQUID FILLED ORAL at 08:25

## 2023-06-23 RX ADMIN — ACETAMINOPHEN 1000 MG: 500 TABLET, FILM COATED ORAL at 08:25

## 2023-06-23 RX ADMIN — OXYCODONE HYDROCHLORIDE 5 MG: 5 TABLET ORAL at 10:17

## 2023-06-23 RX ADMIN — OXYCODONE HYDROCHLORIDE 10 MG: 5 TABLET ORAL at 04:11

## 2023-06-23 RX ADMIN — INSULIN GLARGINE 14 UNITS: 100 INJECTION, SOLUTION SUBCUTANEOUS at 08:25

## 2023-06-23 ASSESSMENT — PAIN DESCRIPTION - LOCATION
LOCATION: ABDOMEN
LOCATION: INCISION;ABDOMEN
LOCATION: ABDOMEN;INCISION

## 2023-06-23 ASSESSMENT — PAIN DESCRIPTION - ORIENTATION
ORIENTATION: LOWER
ORIENTATION: LOWER

## 2023-06-23 ASSESSMENT — PAIN SCALES - GENERAL
PAINLEVEL_OUTOF10: 5
PAINLEVEL_OUTOF10: 5
PAINLEVEL_OUTOF10: 3
PAINLEVEL_OUTOF10: 8
PAINLEVEL_OUTOF10: 5

## 2023-06-23 ASSESSMENT — PAIN - FUNCTIONAL ASSESSMENT
PAIN_FUNCTIONAL_ASSESSMENT: PREVENTS OR INTERFERES SOME ACTIVE ACTIVITIES AND ADLS
PAIN_FUNCTIONAL_ASSESSMENT: ACTIVITIES ARE NOT PREVENTED
PAIN_FUNCTIONAL_ASSESSMENT: ACTIVITIES ARE NOT PREVENTED

## 2023-06-23 ASSESSMENT — PAIN DESCRIPTION - DESCRIPTORS
DESCRIPTORS: BURNING;SORE
DESCRIPTORS: DISCOMFORT
DESCRIPTORS: BURNING;DISCOMFORT
DESCRIPTORS: SORE
DESCRIPTORS: BURNING

## 2023-06-23 NOTE — PROGRESS NOTES
Insulin doses clarified with Dr Roxanna Forbes via telephone. Per Dr Roxanna Forbes, pt to continue Lantus 14 units in AM and 18 units in PM, stop Humalog. Read back. Pt updated. Pt verbalizes understanding.

## 2023-06-23 NOTE — DISCHARGE INSTRUCTIONS
only one drink, and limit the number of occasions that you have a drink. Wait to breastfeed at least 2 hours after you have a drink to reduce the amount of alcohol the baby may get in the milk. Medicines    Your doctor will tell you if and when you can restart your medicines. You will also get instructions about taking any new medicines. If you stopped taking aspirin or some other blood thinner, your doctor will tell you when to start taking it again. Take pain medicines exactly as directed. If the doctor gave you a prescription medicine for pain, take it as prescribed. If you are not taking a prescription pain medicine, ask your doctor if you can take an over-the-counter medicine. If you think your pain medicine is making you sick to your stomach: Take your medicine after meals (unless your doctor has told you not to). Ask your doctor for a different pain medicine. If your doctor prescribed antibiotics, take them as directed. Do not stop taking them just because you feel better. You need to take the full course of antibiotics. Incision care    If you have strips of tape on the incision, leave the tape on for a week or until it falls off. Wash the area daily with warm, soapy water, and pat it dry. Don't use hydrogen peroxide or alcohol, which can slow healing. You may cover the area with a gauze bandage if it weeps or rubs against clothing. Change the bandage every day. Keep the area clean and dry. Other instructions    If you breastfeed your baby, you may be more comfortable while you are healing if you don't rest your baby on your belly. Try tucking your baby under your arm, with your baby's body along the side you will be feeding on. Support your baby's upper body with your arm. With that hand you can control your baby's head to bring your baby's mouth to your breast. This is sometimes called the football hold. Follow-up care is a key part of your treatment and safety.  Be sure

## 2023-06-23 NOTE — PROGRESS NOTES
[address], [address] [address], [address]  [phone]    Madhu Lopez MD, [place], [facility]  Martin Iraheta MD, FACOG    Pt is S/P repeat  at 39 weeks due to prev C/S. No complaints today. Normal PO pain. No GI/ issues. Lochia < menses. No F/C. VITALS  Patient Vitals for the past 24 hrs:   BP Temp Temp src Pulse Resp SpO2   23 0752 114/81 98.2 °F (36.8 °C) Oral 64 17 97 %   23 0411 -- -- -- -- 16 --   23 0046 109/66 98.4 °F (36.9 °C) Oral 72 16 97 %   23 2118 -- -- -- -- 16 --   23 1429 113/85 97.9 °F (36.6 °C) Oral 69 18 98 %         CV - RRR  LUNGS - CTA bilaterally  ABD - soft, NABS, appropriate tenderness, incision C/D/I  EXT - tr edema bilaterally    Labs:  No results found for this or any previous visit (from the past 24 hour(s)). POD #3 LTCS     Pt is breast feeding. No issues or complaints today. Stable. (+) ambulating, voiding. Routine PP/PO instructions. Pt to F/U with endocrinology in the next 1-2 weeks.     Madhu Lopez MD  9:27 AM  23

## 2023-06-23 NOTE — LACTATION NOTE
This note was copied from a baby's chart. Individualized Feeding Plan for Breastfeeding   Lactation Services [phone]    As much as possible, hold your baby on your chest so babys bare skin is against your bare skin with a blanket covering babys back, especially 30 minutes before it is time for baby to eat. Watch for early feeding cues such as, licking lips, sucking motions, rooting, hands to mouth. Crying is a late feeding cue. Feed your baby at least 8 times in 24 hours, or more if your baby is showing feeding cues. If baby is sleepy put baby skin to skin and watch for hunger cues. To rouse baby: unwrap, undress, massage hands, feet, & back, change diaper, gently change babys position from lying to sitting. 15-20 minutes on the first breast of active breastfeeding is considered a good feeding. Good, active breastfeeding is when baby is alert, tugging the nipple, their ear may move, and you can hear swallows. Allow baby to finish the first side before changing sides. Sleeping at the breast or only brief, light sucks should not be considered a good, full breastfeed. At each feeding:  __x__1. Do Suck Practice on finger before each feeding until sucking pattern is smooth. Try using index finger. Nail down towards tongue. __x__2. Hand Express for a few minutes prior to latching to help start milk flow. __x__3. Baby needs to NURSE WELL x 15-20 minutes on at least first breast, burp and offer 2nd breast at every feeding. If no sustained latch only attempt at breast for 10 minutes. If baby does NOT latch on and feed well on at least one side, you should:   __x__4. Double pump for 15 minutes with breast massage and compression. Hand express for an additional 2-3 minutes per side. Pump after each feeding attempt or poor feeding, up to 8 times per day. If you are not putting baby to the breast you need to pump 8 times a day. Pump every 3 hours. __x__5.  Give baby all of

## 2023-06-23 NOTE — LACTATION NOTE
Lactation visit. Mom pumping and feeding pumped milk or formula via bottle. Mom pumping good volume. States she feels slightly engorged today and volume has decreased slightly. Reviewed engorgement precautions and impact of inflammation on volume. Warm moist heat prior to pumping, massage with pumping and then ice post pumping. Continue motrin as prescribed even at home. Not trying much with latching. Has 2 pumps for home use, aware of pump rental if needed. Feeding plan printed and given, reviewed intake needs for infant based on age and weight.

## 2023-06-23 NOTE — DISCHARGE SUMMARY
[address], [place] [address], [address]  [address] [facility] MD Jose R, Elly Mcintyre, [facility]  Date of Admission:  2023  7:08 AM  Date of Discharge:  2023  2:55 PM    Patient Active Problem List   Diagnosis    History of gestational diabetes    Pregnancy with type 2 diabetes mellitus in third trimester    Polycystic ovary affecting pregnancy, antepartum    Encounter for supervision of high-risk pregnancy with history of infertility in third trimester    History of kidney stones    Controlled type 2 diabetes mellitus without complication, without long-term current use of insulin ([facility] [address]    COVID-19 vaccine series declined    Uses self-applied continuous glucose monitoring device    Obesity affecting pregnancy in third trimester    History of  delivery    Antepartum anemia    Insulin controlled gestational diabetes mellitus (GDM) in third trimester    Polyhydramnios in third trimester    Delivered by  delivery following previous  delivery    39 weeks gestation of pregnancy        Patient is a 35 y.o.  at 39w0d wks who was admitted for repeat . The indication for  was prev C/S. A Low Transverse  was performed with normal amount of blood loss. On post-op day #1, the patient had her catheter removed and was ambulating well in the [place]. Her post operative hemoglobin was stable. Patient had a normal post operative course. Incision stayed clean and dry, without erythema. Patient remained afebrile throughout the entire hospital stay. On day of discharge, she was discharged home in good condition with routine post  instructions. She was breast feeding the infant on discharge. Pt was scheduled to follow up in two weeks for an incision check at [facility].  Pt to F/U with endocrinologist in the next 1-2 weeks      Discharge Meds:       Medication List        START taking these medications      ibuprofen 600 MG

## 2023-07-06 ENCOUNTER — OFFICE VISIT (OUTPATIENT)
Dept: ENDOCRINOLOGY | Age: 33
End: 2023-07-06

## 2023-07-06 VITALS — DIASTOLIC BLOOD PRESSURE: 78 MMHG | OXYGEN SATURATION: 97 % | SYSTOLIC BLOOD PRESSURE: 116 MMHG | HEART RATE: 86 BPM

## 2023-07-06 DIAGNOSIS — E11.9 TYPE 2 DIABETES MELLITUS WITHOUT COMPLICATION, WITH LONG-TERM CURRENT USE OF INSULIN (HCC): Primary | ICD-10-CM

## 2023-07-06 DIAGNOSIS — Z79.4 TYPE 2 DIABETES MELLITUS WITHOUT COMPLICATION, WITH LONG-TERM CURRENT USE OF INSULIN (HCC): Primary | ICD-10-CM

## 2023-07-06 LAB — HBA1C MFR BLD: 5.5 %

## 2023-07-06 RX ORDER — INSULIN LISPRO 100 U/ML
INJECTION, SOLUTION SUBCUTANEOUS
Qty: 15 ML | Refills: 11
Start: 2023-07-06 | End: 2023-07-07 | Stop reason: SDUPTHER

## 2023-07-06 RX ORDER — INSULIN GLARGINE-YFGN 100 [IU]/ML
INJECTION, SOLUTION SUBCUTANEOUS
Qty: 15 ML | Refills: 11
Start: 2023-07-06

## 2023-07-06 RX ORDER — BLOOD-GLUCOSE METER
EACH MISCELLANEOUS
Qty: 1 KIT | Refills: 3 | Status: SHIPPED | OUTPATIENT
Start: 2023-07-06

## 2023-07-06 NOTE — PROGRESS NOTES
demonstrated no diabetic retinopathy. Obesity:         There is no height or weight on file to calculate BMI.       stable      Wt Readings from Last 3 Encounters:   06/19/23 221 lb (100.2 kg)   06/15/23 219 lb (99.3 kg)   06/12/23 220 lb (99.8 kg)         CardioVascular:    None     Renal:                            05/03/2022      Cr 0.93, GFR >60       Lipids:     Current therapy none    No results found for: CHOL, TRIGL, HDL, LDLC, VLDL    Hemoglobin A1c:    04/13/2022 8.5%    06/22/2022 6.4%.    08/17/2022 6.3%    11/03/2022 5.8%    07/06/2023 5.5%       Thyroid:       04/13/2022    TSH  1.310    Free T4 1.26    Allergies & Medications:    Reviewed in chart. Review of Systems    Vital Signs:  /78   Pulse 86   LMP 09/20/2022 (Approximate)   SpO2 97%       Physical Exam  Constitutional:       Appearance: Normal appearance. HENT:      Head: Normocephalic. Neck:      Thyroid: No thyroid mass or thyromegaly. Vascular: No carotid bruit. Cardiovascular:      Rate and Rhythm: Normal rate and regular rhythm. Pulmonary:      Effort: Pulmonary effort is normal.      Breath sounds: Normal breath sounds. Abdominal:      Palpations: Abdomen is soft. Musculoskeletal:      Cervical back: Neck supple. Right lower leg: No edema. Left lower leg: No edema. Feet:      Right foot:      Protective Sensation: 3 sites tested. 3 sites sensed. Skin integrity: Skin integrity normal.      Left foot:      Protective Sensation: 3 sites tested. 3 sites sensed. Skin integrity: Skin integrity normal.   Lymphadenopathy:      Cervical: No cervical adenopathy. Skin:     General: Skin is warm and dry. Neurological:      General: No focal deficit present. Mental Status: She is alert. Sensory: Sensation is intact. Psychiatric:         Mood and Affect: Mood normal.         Behavior: Behavior normal.         Thought Content:  Thought content normal.         Judgment: Judgment

## 2023-07-07 RX ORDER — INSULIN LISPRO 100 U/ML
INJECTION, SOLUTION SUBCUTANEOUS
Qty: 15 ML | Refills: 11
Start: 2023-07-07

## 2023-07-07 RX ORDER — METFORMIN HYDROCHLORIDE 500 MG/1
500 TABLET, EXTENDED RELEASE ORAL
Qty: 30 TABLET | Refills: 5
Start: 2023-07-07

## 2023-07-10 ENCOUNTER — POSTPARTUM VISIT (OUTPATIENT)
Dept: OBGYN CLINIC | Age: 33
End: 2023-07-10

## 2023-07-10 VITALS — DIASTOLIC BLOOD PRESSURE: 68 MMHG | SYSTOLIC BLOOD PRESSURE: 110 MMHG | WEIGHT: 190 LBS | BODY MASS INDEX: 30.67 KG/M2

## 2023-07-10 DIAGNOSIS — Z09 POSTOP CHECK: Primary | ICD-10-CM

## 2023-07-10 PROCEDURE — 99024 POSTOP FOLLOW-UP VISIT: CPT | Performed by: OBSTETRICS & GYNECOLOGY

## 2023-08-10 ENCOUNTER — POSTPARTUM VISIT (OUTPATIENT)
Dept: OBGYN CLINIC | Age: 33
End: 2023-08-10

## 2023-08-10 VITALS
WEIGHT: 191 LBS | DIASTOLIC BLOOD PRESSURE: 72 MMHG | HEIGHT: 66 IN | BODY MASS INDEX: 30.7 KG/M2 | SYSTOLIC BLOOD PRESSURE: 112 MMHG

## 2023-08-10 DIAGNOSIS — Z30.09 ENCOUNTER FOR COUNSELING REGARDING CONTRACEPTION: ICD-10-CM

## 2023-08-10 DIAGNOSIS — Z09 POSTOPERATIVE FOLLOW-UP: ICD-10-CM

## 2023-08-10 DIAGNOSIS — E11.9 TYPE 2 DIABETES MELLITUS WITHOUT COMPLICATION, UNSPECIFIED WHETHER LONG TERM INSULIN USE (HCC): ICD-10-CM

## 2023-08-10 PROBLEM — Z3A.39 39 WEEKS GESTATION OF PREGNANCY: Status: RESOLVED | Noted: 2023-06-20 | Resolved: 2023-08-10

## 2023-08-10 RX ORDER — ACETAMINOPHEN AND CODEINE PHOSPHATE 120; 12 MG/5ML; MG/5ML
1 SOLUTION ORAL DAILY
Qty: 90 TABLET | Refills: 4 | Status: SHIPPED | OUTPATIENT
Start: 2023-08-10

## 2023-08-10 RX ORDER — SERTRALINE HYDROCHLORIDE 25 MG/1
TABLET, FILM COATED ORAL
Qty: 30 TABLET | Refills: 3 | Status: CANCELLED | OUTPATIENT
Start: 2023-08-10

## 2023-08-10 NOTE — PROGRESS NOTES
CC:  PP/C/S postop follow-up    HPI:  Ghulam Jimenez presents for postop visit from  about 6 weeks ago on 23 w/ me at 39w0d with prior c/s x1, well controlled T2DM w/ mild polyhydramnios. Prenatal course complicated by the above as well as hx prior LTCS x1,  BMI 33, and mild anemia. S/p endocrine --- has seen pp. Patient doing well postpartum without significant complaints. Voiding without difficulty. Pain controlled on no medications at this point. Light spotting only. +BM/Flatus. Denies n/v, tolerating regular diet. Breast feeding/pumping, denies any breast complaints (erythema, lesions, rashes, abscess, etc). Concerned for pp depression at 2wk postop apt      Denies SI/HI  EPDS 15 last encounter  EPDS today improved to 6  Started on Rx Zoloft 50mg -- doing much better but interested in going up a little to 75mg.  No SEs        Patient Active Problem List    Diagnosis Date Noted    History of  delivery 2023    Uses self-applied continuous glucose monitoring device 2022    COVID-19 vaccine series declined 2022    Controlled type 2 diabetes mellitus without complication, without long-term current use of insulin ([address]) 2022    History of kidney stones 2022    Obesity affecting pregnancy in third trimester 10/10/2016    Depression, postpartum 07/10/2023    Delivered by  delivery following previous  delivery 2023    Insulin controlled gestational diabetes mellitus (GDM) in third trimester 2023    Polyhydramnios in third trimester 2023    Antepartum anemia 2023    Encounter for supervision of high-risk pregnancy with history of infertility in third trimester     Pregnancy with type 2 diabetes mellitus in third trimester     Polycystic ovary affecting pregnancy, antepartum     History of gestational diabetes 2016          Physical Exam:   /72   Ht 5' 6\" (1.676 m)   Wt 191 lb (86.6 kg)   LMP

## 2023-11-09 ENCOUNTER — OFFICE VISIT (OUTPATIENT)
Dept: ENDOCRINOLOGY | Age: 33
End: 2023-11-09

## 2023-11-09 VITALS
SYSTOLIC BLOOD PRESSURE: 106 MMHG | DIASTOLIC BLOOD PRESSURE: 72 MMHG | WEIGHT: 184.4 LBS | OXYGEN SATURATION: 98 % | HEART RATE: 76 BPM | BODY MASS INDEX: 29.76 KG/M2

## 2023-11-09 DIAGNOSIS — E11.9 TYPE 2 DIABETES MELLITUS WITHOUT COMPLICATION, WITH LONG-TERM CURRENT USE OF INSULIN (HCC): Primary | ICD-10-CM

## 2023-11-09 DIAGNOSIS — Z79.4 TYPE 2 DIABETES MELLITUS WITHOUT COMPLICATION, WITH LONG-TERM CURRENT USE OF INSULIN (HCC): Primary | ICD-10-CM

## 2023-11-09 DIAGNOSIS — E28.2 PCOS (POLYCYSTIC OVARIAN SYNDROME): Chronic | ICD-10-CM

## 2023-11-09 LAB — HBA1C MFR BLD: 5.7 %

## 2023-11-09 RX ORDER — PROCHLORPERAZINE 25 MG/1
SUPPOSITORY RECTAL
Qty: 9 EACH | Refills: 3 | Status: SHIPPED | OUTPATIENT
Start: 2023-11-09

## 2023-11-09 RX ORDER — PROCHLORPERAZINE 25 MG/1
SUPPOSITORY RECTAL
Qty: 1 EACH | Refills: 3 | Status: SHIPPED | OUTPATIENT
Start: 2023-11-09

## 2023-11-09 NOTE — PROGRESS NOTES
[facility]   [facility]   [facility]    Tari Hemphill PA-C  [facility]od[facility]  [address] [facility], [address], [address] [place] [address] Rd,[address]  Phone [phone]  Facsimile [phone]          Smitha Dahl is a 35 y.o. female seen 7/6/2023 for follow up evaluation of type 2 diabetes now pregnant        Assessment and Plan:      Interpretation of 72 hour glucose monitor: At least 72 hours of data were reviewed. The patient utilizes a dexcom G6 continuous glucose monitoring system. The average glucose during the reviewed timeframe was 116 with a standard deviation of 15. There is a pattern of stable glycemic control    1. Type 2 diabetes mellitus without complication, with long-term current use of insulin ([address])  Patient currently lactating. Doing well on current treatment regimen of basal insulin, metformin, and Admelog by correction. She has no acute complaints    Home foot care was discussed at length and patient was discouraged from ambulating barefoot    We can consider an alternate therapy at future visit upon cessation of lactation    Patient is aware to down titrate basal insulin as indicated to a fasting blood glucose at goal 80-1 25 without hypoglycemia in the context of continued intentional weight loss postpartum    Declines need for refill of glucagon    Discussed free diabetes education     Refill CGM, discussed patient assistance if not covered vs self pay    - AMB POC HEMOGLOBIN A1C  - Continuous Blood Gluc Transmit (DEXCOM G6 TRANSMITTER) MISC; Use to monitor blood glucose, E11.65  Dispense: 1 each; Refill: 3  - Continuous Blood Gluc Sensor (DEXCOM G6 SENSOR) MISC; Change every 10 days as directed. Dx E11.9  Dispense: 9 each; Refill: 3  - HM DIABETES FOOT EXAM  - MI CONTINUOUS GLUCOSE MONITORING ANALYSIS I&R    2. PCOS (polycystic ovarian syndrome)  No plans for pregnancy.   Now on norethindrone based OCP which is appropriate  Saji Vidal was

## 2024-11-11 ENCOUNTER — OFFICE VISIT (OUTPATIENT)
Dept: ENDOCRINOLOGY | Age: 34
End: 2024-11-11
Payer: COMMERCIAL

## 2024-11-11 VITALS
WEIGHT: 211.4 LBS | DIASTOLIC BLOOD PRESSURE: 78 MMHG | OXYGEN SATURATION: 98 % | HEART RATE: 88 BPM | BODY MASS INDEX: 33.97 KG/M2 | HEIGHT: 66 IN | SYSTOLIC BLOOD PRESSURE: 122 MMHG

## 2024-11-11 DIAGNOSIS — E11.9 TYPE 2 DIABETES MELLITUS WITHOUT COMPLICATION, WITH LONG-TERM CURRENT USE OF INSULIN (HCC): Primary | ICD-10-CM

## 2024-11-11 DIAGNOSIS — Z79.4 TYPE 2 DIABETES MELLITUS WITHOUT COMPLICATION, WITH LONG-TERM CURRENT USE OF INSULIN (HCC): Primary | ICD-10-CM

## 2024-11-11 DIAGNOSIS — E28.2 PCOS (POLYCYSTIC OVARIAN SYNDROME): ICD-10-CM

## 2024-11-11 LAB — HBA1C MFR BLD: 5.7 %

## 2024-11-11 PROCEDURE — 95251 CONT GLUC MNTR ANALYSIS I&R: CPT | Performed by: PHYSICIAN ASSISTANT

## 2024-11-11 PROCEDURE — 99214 OFFICE O/P EST MOD 30 MIN: CPT | Performed by: PHYSICIAN ASSISTANT

## 2024-11-11 PROCEDURE — 83036 HEMOGLOBIN GLYCOSYLATED A1C: CPT | Performed by: PHYSICIAN ASSISTANT

## 2024-11-11 RX ORDER — METFORMIN HYDROCHLORIDE 500 MG/1
500 TABLET, EXTENDED RELEASE ORAL
Qty: 30 TABLET | Refills: 5 | Status: SHIPPED | OUTPATIENT
Start: 2024-11-11

## 2024-11-11 RX ORDER — PROCHLORPERAZINE 25 MG/1
SUPPOSITORY RECTAL
Qty: 1 EACH | Refills: 3 | Status: SHIPPED | OUTPATIENT
Start: 2024-11-11

## 2024-11-11 RX ORDER — INSULIN LISPRO 100 U/ML
INJECTION, SOLUTION SUBCUTANEOUS
Qty: 15 ML | Refills: 11 | Status: SHIPPED | OUTPATIENT
Start: 2024-11-11 | End: 2024-11-14 | Stop reason: SDUPTHER

## 2024-11-11 RX ORDER — SEMAGLUTIDE 0.68 MG/ML
INJECTION, SOLUTION SUBCUTANEOUS
Qty: 9 ML | Refills: 3 | Status: SHIPPED | OUTPATIENT
Start: 2024-11-11

## 2024-11-11 RX ORDER — PROCHLORPERAZINE 25 MG/1
SUPPOSITORY RECTAL
Qty: 9 EACH | Refills: 3 | Status: SHIPPED | OUTPATIENT
Start: 2024-11-11

## 2024-11-11 RX ORDER — FLURBIPROFEN SODIUM 0.3 MG/ML
SOLUTION/ DROPS OPHTHALMIC
Qty: 150 EACH | Refills: 11 | Status: SHIPPED | OUTPATIENT
Start: 2024-11-11

## 2024-11-11 NOTE — PROGRESS NOTES
(211 lb 6.4 oz)   LMP  (LMP Unknown)   SpO2 98%   Breastfeeding No   BMI 34.12 kg/m²       Physical Exam  Constitutional:       Appearance: Normal appearance.   HENT:      Head: Normocephalic.   Neck:      Thyroid: No thyroid mass or thyromegaly.      Vascular: No carotid bruit.   Cardiovascular:      Rate and Rhythm: Normal rate and regular rhythm.   Pulmonary:      Effort: Pulmonary effort is normal.      Breath sounds: Normal breath sounds.   Abdominal:      Palpations: Abdomen is soft.   Musculoskeletal:      Cervical back: Neck supple.      Right lower leg: No edema.      Left lower leg: No edema.   Feet:      Right foot:      Protective Sensation: 3 sites tested.  3 sites sensed.      Skin integrity: Skin integrity normal.      Left foot:      Protective Sensation: 3 sites tested.  3 sites sensed.      Skin integrity: Skin integrity normal.   Lymphadenopathy:      Cervical: No cervical adenopathy.   Skin:     General: Skin is warm and dry.   Neurological:      General: No focal deficit present.      Mental Status: She is alert.      Sensory: Sensation is intact.   Psychiatric:         Mood and Affect: Mood normal.         Behavior: Behavior normal.         Thought Content: Thought content normal.         Judgment: Judgment normal.             Return in about 16 weeks (around 3/3/2025) for Diabetes DM2 Follow-Up.        Portions of this note were generated with the assistance of voice recogniton software.  As such, some errors in transcription may be present.

## 2024-11-14 ENCOUNTER — TELEPHONE (OUTPATIENT)
Dept: ENDOCRINOLOGY | Age: 34
End: 2024-11-14

## 2024-11-14 DIAGNOSIS — Z79.4 TYPE 2 DIABETES MELLITUS WITHOUT COMPLICATION, WITH LONG-TERM CURRENT USE OF INSULIN (HCC): ICD-10-CM

## 2024-11-14 DIAGNOSIS — E11.9 TYPE 2 DIABETES MELLITUS WITHOUT COMPLICATION, WITH LONG-TERM CURRENT USE OF INSULIN (HCC): ICD-10-CM

## 2024-11-14 RX ORDER — INSULIN LISPRO 100 [IU]/ML
INJECTION, SOLUTION INTRAVENOUS; SUBCUTANEOUS
Qty: 15 ML | Refills: 11 | Status: SHIPPED | OUTPATIENT
Start: 2024-11-14

## 2024-11-14 NOTE — TELEPHONE ENCOUNTER
Admelog not on formulary      Covered:  Insulin Lispro Kwikpen/vial  Insulin Lispro Mix 75/25 Kwikpen  Apridra Solostar  Pen  Humulin 70/30 Kwikpen/Vial  Humulin N KwikPen/Vial  Humulin R U-100 Vial  Humulin R U-500 Kwikpen/vial  Novolog  Lantus Solostar/vial  Levemir Flexpen/vial

## 2025-02-05 ENCOUNTER — TELEPHONE (OUTPATIENT)
Dept: ENDOCRINOLOGY | Age: 35
End: 2025-02-05

## 2025-02-05 NOTE — TELEPHONE ENCOUNTER
PA submitted for Dexcom sensor and transmitter via paper fax to [facility].

## 2025-03-11 ENCOUNTER — OFFICE VISIT (OUTPATIENT)
Dept: ENDOCRINOLOGY | Age: 35
End: 2025-03-11
Payer: COMMERCIAL

## 2025-03-11 VITALS
HEIGHT: 67 IN | BODY MASS INDEX: 31.48 KG/M2 | DIASTOLIC BLOOD PRESSURE: 72 MMHG | HEART RATE: 64 BPM | WEIGHT: 200.6 LBS | SYSTOLIC BLOOD PRESSURE: 112 MMHG | OXYGEN SATURATION: 97 %

## 2025-03-11 DIAGNOSIS — E28.2 PCOS (POLYCYSTIC OVARIAN SYNDROME): ICD-10-CM

## 2025-03-11 DIAGNOSIS — Z79.4 TYPE 2 DIABETES MELLITUS WITHOUT COMPLICATION, WITH LONG-TERM CURRENT USE OF INSULIN (HCC): Primary | ICD-10-CM

## 2025-03-11 DIAGNOSIS — E11.9 TYPE 2 DIABETES MELLITUS WITHOUT COMPLICATION, WITH LONG-TERM CURRENT USE OF INSULIN (HCC): Primary | ICD-10-CM

## 2025-03-11 PROCEDURE — 99214 OFFICE O/P EST MOD 30 MIN: CPT | Performed by: PHYSICIAN ASSISTANT

## 2025-03-11 PROCEDURE — 95251 CONT GLUC MNTR ANALYSIS I&R: CPT | Performed by: PHYSICIAN ASSISTANT

## 2025-03-11 RX ORDER — SEMAGLUTIDE 0.68 MG/ML
0.5 INJECTION, SOLUTION SUBCUTANEOUS
Qty: 9 ML | Refills: 3 | Status: SHIPPED | OUTPATIENT
Start: 2025-03-11

## 2025-03-11 NOTE — PROGRESS NOTES
[facility] ENDOCRINOLOGY   AND   THYROID NODULE CLINIC    Aydee Corrales PA-C  [facility] Endocrinology and Thyroid [facility]odule [facility]  [address] [place] [address], [address] [address]  Phone [phone]  Facsimile [phone]          Flor Marquis is a 35 y.o. female seen 03/11/25 for follow up evaluation of type 2 diabetes       Assessment and Plan:      Interpretation of 72 hour glucose monitor:  At least 72 hours of data were reviewed.  The patient utilizes a dexcom G6 continuous glucose monitoring system.  The average glucose during the reviewed timeframe was 109 with a standard deviation of 23.  There is a pattern of stable glycemic control   Assessment & Plan        1. Type 2 diabetes mellitus without complication, with long-term current use of insulin (HCC)  table. A1c 5.6.  - Monitor blood glucose via fingerstick tests throughout the week if off CGM  - Encourage walking 20 minutes three times a week, with potential to increase intensity, duration, or frequency.  - Consume light meals and ensure adequate hydration.  - Inform if pregnancy occurs, no plans, using protection  - Refill prescription for Ozempic 0.5 mg.  - Ordered fasting blood and urine tests before next visit.  -can restart metformin that was well tolerated    - AMB POC HEMOGLOBIN A1C  - OZEMPIC, 0.25 OR 0.5 MG/DOSE, 2 MG/3ML SOPN; Inject 0.5 mg into the skin every 7 days E11.65  Dispense: 9 mL; Refill: 3  - Comprehensive Metabolic Panel; Future  - CBC with Auto Differential; Future  - Albumin/Creatinine Ratio, Urine; Future  - Lipid Panel; Future  - TSH reflex to FT4; Future  - HM DIABETES FOOT EXAM  - GLUCOSE MONITOR, 72 HOUR, PHYS INTERP      2. PCOS (polycystic ovarian syndrome)  No plans for pregnancy.  Now on norethindrone based OCP which is appropriate  Flor was seen today for follow-up.    Diagnoses and all orders for this visit:    Type 2 diabetes mellitus without complication, with long-term current use of insulin

## 2025-04-22 ENCOUNTER — OFFICE VISIT (OUTPATIENT)
Dept: UROLOGY | Age: 35
End: 2025-04-22
Payer: COMMERCIAL

## 2025-04-22 DIAGNOSIS — N20.0 KIDNEY STONE: Primary | ICD-10-CM

## 2025-04-22 DIAGNOSIS — R31.29 MICROHEMATURIA: ICD-10-CM

## 2025-04-22 LAB
BILIRUBIN, URINE, POC: NEGATIVE
BLOOD URINE, POC: NORMAL
GLUCOSE URINE, POC: NEGATIVE MG/DL
KETONES, URINE, POC: NEGATIVE MG/DL
LEUKOCYTE ESTERASE, URINE, POC: NEGATIVE
NITRITE, URINE, POC: NEGATIVE
PH, URINE, POC: 5.5 (ref 4.6–8)
PROTEIN,URINE, POC: 100 MG/DL
SPECIFIC GRAVITY, URINE, POC: 1.03 (ref 1–1.03)
URINALYSIS CLARITY, POC: NORMAL
URINALYSIS COLOR, POC: NORMAL
UROBILINOGEN, POC: NORMAL MG/DL

## 2025-04-22 PROCEDURE — 99214 OFFICE O/P EST MOD 30 MIN: CPT | Performed by: NURSE PRACTITIONER

## 2025-04-22 PROCEDURE — 81003 URINALYSIS AUTO W/O SCOPE: CPT | Performed by: NURSE PRACTITIONER

## 2025-04-22 PROCEDURE — 74018 RADEX ABDOMEN 1 VIEW: CPT | Performed by: NURSE PRACTITIONER

## 2025-04-22 ASSESSMENT — ENCOUNTER SYMPTOMS
NAUSEA: 0
BACK PAIN: 0

## 2025-04-22 NOTE — H&P (VIEW-ONLY)
Negative mg/dL    Specific Gravity, Urine, POC 1.030 1.001 - 1.035    Blood (UA POC) Large     pH, Urine, POC 5.5 4.6 - 8.0    Protein, Urine,  Negative mg/dL    Urobilinogen, POC 0.2 mg/dL <1.1 mg/dL    Nitrite, Urine, POC Negative Negative    Leukocyte Esterase, Urine, POC Negative Negative       UA - Micro  WBC - 0  RBC - 5-15  Bacteria - 0  Epith - 0    PHYSICAL EXAM    General appearance - well appearing and in no distress  Mental status - alert, oriented to person, place, and time  Neck - supple, no significant adenopathy  Chest/Lung-  Quiet, even and easy respiratory effort without use of accessory muscles  Skin - normal coloration and turgor, no rashes    KUB in office today reviewed by myself and shows R ?renal pelvis stone (8 mm)      Assessment and Plan    ICD-10-CM    1. Kidney stone  N20.0 AMB POC URINALYSIS DIP STICK AUTO W/O MICRO     AMB POC XRAY ABDOMEN 1 VIEW      2. Microhematuria  R31.29 AMB POC URINALYSIS DIP STICK AUTO W/O MICRO     AMB POC XRAY ABDOMEN 1 VIEW          Kidney stone- KUB reviewed. Recommend CT scan. She is scheduled in 2 days for this. Will obtain report/images and contact patient. Follow up pending this.    **if CT confirms stones, she would consider R ESWL.    Microhematuria- urine w hematuria but no infection. Likely d/t kidney stone but will f/u w CT.     Advised to contact me sooner if needed.    Amanda Tam, BECKY - CNP  Dr. Mcclure is supervising physician today and he approves plan of care.      I have spent 31 minutes today reviewing previous notes, test results and face to face with the patient as well as documenting.

## 2025-04-22 NOTE — PROGRESS NOTES
(DEXCOM G6 SENSOR) MISC Change every 10 days as directed. Dx E11.9 9 each 3    metFORMIN (GLUCOPHAGE-XR) 500 MG extended release tablet Take 1 tablet by mouth daily (before dinner) 30 tablet 5    norethindrone (ORTHO MICRONOR) 0.35 MG tablet Take 1 tablet by mouth daily 90 tablet 4    sertraline (ZOLOFT) 50 MG tablet Take 1.5 tablets by mouth daily Pt is to take 75 mg daily 90 tablet 5    sertraline (ZOLOFT) 50 MG tablet Take 1 tablet by mouth daily 30 tablet 2    Blood Glucose Monitoring Suppl (ACCU-CHEK GUIDE) w/Device KIT Use to check glucose 4 times daily, E11.65 1 kit 3    ibuprofen (ADVIL;MOTRIN) 600 MG tablet Take 1 tablet by mouth every 6 hours as needed for Pain 60 tablet 0    ferrous sulfate (IRON 325) 325 (65 Fe) MG tablet Take 1 tablet by mouth daily (with breakfast) 30 tablet 5    blood glucose test strips (ACCU-CHEK GUIDE) strip Check glucose and calibrate CGM up to twice daily E11.65 150 strip 3    Accu-Chek Softclix Lancets MISC Use 4x daily to test BG. 150 each 5    Prenatal Vit-Fe Fumarate-FA (PRENATAL VITAMIN PO) Take by mouth       No current facility-administered medications for this visit.     Allergies   Allergen Reactions    Sulfa Antibiotics Rash     Social History     Socioeconomic History    Marital status: Single     Spouse name: Not on file    Number of children: Not on file    Years of education: Not on file    Highest education level: Not on file   Occupational History    Not on file   Tobacco Use    Smoking status: Never    Smokeless tobacco: Never   Vaping Use    Vaping status: Never Used   Substance and Sexual Activity    Alcohol use: No    Drug use: No    Sexual activity: Not Currently     Partners: Male     Birth control/protection: None   Other Topics Concern    Not on file   Social History Narrative    Not on file     Social Drivers of Health     Financial Resource Strain: Low Risk  (2/1/2023)    Overall Financial Resource Strain (CARDIA)     Difficulty of Paying Living

## 2025-04-25 ENCOUNTER — TELEPHONE (OUTPATIENT)
Dept: UROLOGY | Age: 35
End: 2025-04-25

## 2025-04-25 DIAGNOSIS — N20.1 URETERAL STONE: ICD-10-CM

## 2025-04-25 DIAGNOSIS — N20.0 KIDNEY STONE: Primary | ICD-10-CM

## 2025-04-25 RX ORDER — ONDANSETRON 4 MG/1
4 TABLET, ORALLY DISINTEGRATING ORAL 3 TIMES DAILY PRN
Qty: 21 TABLET | Refills: 0 | Status: SHIPPED | OUTPATIENT
Start: 2025-04-25

## 2025-04-25 RX ORDER — PHENAZOPYRIDINE HYDROCHLORIDE 200 MG/1
200 TABLET, FILM COATED ORAL 3 TIMES DAILY PRN
Qty: 15 TABLET | Refills: 0 | Status: SHIPPED | OUTPATIENT
Start: 2025-04-25 | End: 2025-04-30

## 2025-04-25 RX ORDER — HYDROCODONE BITARTRATE AND ACETAMINOPHEN 5; 325 MG/1; MG/1
1 TABLET ORAL EVERY 6 HOURS PRN
Qty: 12 TABLET | Refills: 0 | Status: SHIPPED | OUTPATIENT
Start: 2025-04-25 | End: 2025-04-28

## 2025-04-25 RX ORDER — HYOSCYAMINE SULFATE 0.12 MG/1
0.12 TABLET SUBLINGUAL EVERY 6 HOURS PRN
Qty: 20 TABLET | Refills: 0 | Status: SHIPPED | OUTPATIENT
Start: 2025-04-25

## 2025-04-25 NOTE — TELEPHONE ENCOUNTER
I did not see the orders.  Could you put in the orders for this?  Thanks.    CYSTOSCOPY BILATERAL URETEROSCOPY LASER LITHOTRIPSY BILATERAL STENT PLACEMENT   Date: 4/30/2025   Time: 1025   Location: [facility] MAIN OR 02

## 2025-04-25 NOTE — TELEPHONE ENCOUNTER
CT reviewed. B ureteral stone. Reviewed w Dr Pierre. Images not available at this time. Recent CBC and BMP unremarkable. UA in office wo infection.     CT showed 2 L ureteral stones, 1 R ureteral stone. Discussed surgery tomorrow. She is unable to get childcare. No fever/chills and she is urinating well. We will arrange for next week. Recommend she go to ER if she has fever 100.5 F or higher, chills, or low urine output.    After our discussion, the patient would like to proceed with bilateral Ureteroscopy/Laser Lithotripsy/Basket Extraction of Stone/Stent Placement.  Risks of ureteroscopy that we discussed were bleeding, infection, injury to the bladder/ureter/kidney and surrounding structures, incomplete fragmentation of stones, steinstrasse, inability to pass stone fragments requiring additional operative intervention in the form of second look ureteroscopy/laser lithotripsy and/or stent placement, ureteral stricture, stent migration, stent pain, urinary retention, risks of general anesthesia, recurrent stones.  The patient expressed understanding of the above.      Will follow up after the procedure. Advised to contact office sooner if needed.      Amanda Tam, APRN - CNP

## 2025-04-25 NOTE — TELEPHONE ENCOUNTER
Cysto/B URS/LL/stent placement. Wednesday w Paula. See me the following Monday for stent removal.     Amanda Tam, APRN - CNP

## 2025-04-28 ENCOUNTER — PREP FOR PROCEDURE (OUTPATIENT)
Dept: UROLOGY | Age: 35
End: 2025-04-28

## 2025-04-28 NOTE — PERIOP NOTE
Patient verified name and .  Order for consent  was found in EHR and does match case posting; patient verifies procedure.       Type 1B surgery, Phone assessment complete.  Orders  received.  Labs per surgeon: none  Labs per anesthesia protocol: CBC with diff and BMP completed on 4.5.25- Potassium 3.6, Hgb:14.3. POC glucose DOS.    PAT instructions sent via [employer].    Pt states she has been off Ozempic for about 2 weeks. Pt voice understanding to follow a clear liquid diet the day prior to surgery.    Patient answered medical/surgical history questions at their best of ability. All prior to admission medications documented in EPIC.    Patient instructed to continue taking all prescription medications up to the day of surgery but to take only the following medications the day of surgery according to anesthesia guidelines with a small sip of water: none. Also, patient is requested to take 2 Tylenol in the morning and then again before bed on the day before surgery. Regular or extra strength may be used.       Patient informed that all vitamins and supplements should be held 7 days prior to surgery and NSAIDS 5 days prior to surgery. Prescription meds to hold:none.    Patient instructed on the following:    > Arrive at Main Entrance, time of arrival to be called the day before by 1700  > No food after midnight, patient may drink clear liquids up until 2 hours prior to arrival. No gum, candy, mints.   > Responsible adult must drive patient to the hospital, stay during surgery, and patient will need supervision 24 hours after anesthesia  > Use non moisturizing soap in shower the night before surgery and on the morning of surgery  > All piercings must be removed prior to arrival.    > Leave all valuables (money and jewelry) at home but bring insurance card and ID on DOS.   > You may be required to pay a deductible or co-pay on the day of your procedure. You can pre-pay by calling [phone] if your surgery is at the

## 2025-04-29 ENCOUNTER — ANESTHESIA EVENT (OUTPATIENT)
Dept: SURGERY | Age: 35
End: 2025-04-29
Payer: COMMERCIAL

## 2025-04-30 ENCOUNTER — HOSPITAL ENCOUNTER (OUTPATIENT)
Age: 35
Setting detail: OUTPATIENT SURGERY
Discharge: HOME | End: 2025-04-30
Attending: UROLOGY | Admitting: UROLOGY
Payer: COMMERCIAL

## 2025-04-30 ENCOUNTER — APPOINTMENT (OUTPATIENT)
Dept: GENERAL RADIOLOGY | Age: 35
End: 2025-04-30
Attending: UROLOGY
Payer: COMMERCIAL

## 2025-04-30 ENCOUNTER — ANESTHESIA (OUTPATIENT)
Dept: SURGERY | Age: 35
End: 2025-04-30
Payer: COMMERCIAL

## 2025-04-30 VITALS
HEART RATE: 66 BPM | WEIGHT: 191 LBS | HEIGHT: 66 IN | DIASTOLIC BLOOD PRESSURE: 68 MMHG | TEMPERATURE: 97.9 F | OXYGEN SATURATION: 97 % | BODY MASS INDEX: 30.7 KG/M2 | RESPIRATION RATE: 16 BRPM | SYSTOLIC BLOOD PRESSURE: 121 MMHG

## 2025-04-30 LAB
GLUCOSE BLD STRIP.AUTO-MCNC: 127 MG/DL (ref 65–100)
HCG UR QL: NEGATIVE
SERVICE CMNT-IMP: ABNORMAL

## 2025-04-30 PROCEDURE — 7100000010 HC PHASE II RECOVERY - FIRST 15 MIN: Performed by: UROLOGY

## 2025-04-30 PROCEDURE — 6360000002 HC RX W HCPCS: Performed by: STUDENT IN AN ORGANIZED HEALTH CARE EDUCATION/TRAINING PROGRAM

## 2025-04-30 PROCEDURE — 6360000002 HC RX W HCPCS: Performed by: ANESTHESIOLOGY

## 2025-04-30 PROCEDURE — 7100000001 HC PACU RECOVERY - ADDTL 15 MIN: Performed by: UROLOGY

## 2025-04-30 PROCEDURE — C1747 HC ENDOSCOPE, SINGLE, URINARY TRACT: HCPCS | Performed by: UROLOGY

## 2025-04-30 PROCEDURE — 3700000000 HC ANESTHESIA ATTENDED CARE: Performed by: UROLOGY

## 2025-04-30 PROCEDURE — 7100000000 HC PACU RECOVERY - FIRST 15 MIN: Performed by: UROLOGY

## 2025-04-30 PROCEDURE — 6360000002 HC RX W HCPCS: Performed by: UROLOGY

## 2025-04-30 PROCEDURE — 2500000003 HC RX 250 WO HCPCS: Performed by: STUDENT IN AN ORGANIZED HEALTH CARE EDUCATION/TRAINING PROGRAM

## 2025-04-30 PROCEDURE — 3600000004 HC SURGERY LEVEL 4 BASE: Performed by: UROLOGY

## 2025-04-30 PROCEDURE — 6370000000 HC RX 637 (ALT 250 FOR IP): Performed by: ANESTHESIOLOGY

## 2025-04-30 PROCEDURE — 7100000011 HC PHASE II RECOVERY - ADDTL 15 MIN: Performed by: UROLOGY

## 2025-04-30 PROCEDURE — 3700000001 HC ADD 15 MINUTES (ANESTHESIA): Performed by: UROLOGY

## 2025-04-30 PROCEDURE — 2580000003 HC RX 258: Performed by: ANESTHESIOLOGY

## 2025-04-30 PROCEDURE — 2709999900 HC NON-CHARGEABLE SUPPLY: Performed by: UROLOGY

## 2025-04-30 PROCEDURE — 3600000014 HC SURGERY LEVEL 4 ADDTL 15MIN: Performed by: UROLOGY

## 2025-04-30 PROCEDURE — C1769 GUIDE WIRE: HCPCS | Performed by: UROLOGY

## 2025-04-30 PROCEDURE — C1894 INTRO/SHEATH, NON-LASER: HCPCS | Performed by: UROLOGY

## 2025-04-30 PROCEDURE — 81025 URINE PREGNANCY TEST: CPT

## 2025-04-30 PROCEDURE — C2617 STENT, NON-COR, TEM W/O DEL: HCPCS | Performed by: UROLOGY

## 2025-04-30 PROCEDURE — 82962 GLUCOSE BLOOD TEST: CPT

## 2025-04-30 DEVICE — URETERAL STENT
Type: IMPLANTABLE DEVICE | Site: URETER | Status: FUNCTIONAL
Brand: PERCUFLEX™ PLUS

## 2025-04-30 DEVICE — URETERAL STENT WITH SIDE HOLES 6FX24CM
Type: IMPLANTABLE DEVICE | Site: URETER | Status: FUNCTIONAL
Brand: TRIA™ SOFT

## 2025-04-30 RX ORDER — FENTANYL CITRATE 50 UG/ML
INJECTION, SOLUTION INTRAMUSCULAR; INTRAVENOUS
Status: DISCONTINUED | OUTPATIENT
Start: 2025-04-30 | End: 2025-04-30 | Stop reason: SDUPTHER

## 2025-04-30 RX ORDER — HALOPERIDOL 5 MG/ML
1 INJECTION INTRAMUSCULAR
Status: DISCONTINUED | OUTPATIENT
Start: 2025-04-30 | End: 2025-04-30 | Stop reason: HOSPADM

## 2025-04-30 RX ORDER — PROCHLORPERAZINE EDISYLATE 5 MG/ML
5 INJECTION INTRAMUSCULAR; INTRAVENOUS
Status: DISCONTINUED | OUTPATIENT
Start: 2025-04-30 | End: 2025-04-30 | Stop reason: HOSPADM

## 2025-04-30 RX ORDER — PROPOFOL 10 MG/ML
INJECTION, EMULSION INTRAVENOUS
Status: DISCONTINUED | OUTPATIENT
Start: 2025-04-30 | End: 2025-04-30 | Stop reason: SDUPTHER

## 2025-04-30 RX ORDER — DEXMEDETOMIDINE HYDROCHLORIDE 100 UG/ML
INJECTION, SOLUTION INTRAVENOUS
Status: DISCONTINUED | OUTPATIENT
Start: 2025-04-30 | End: 2025-04-30 | Stop reason: SDUPTHER

## 2025-04-30 RX ORDER — LIDOCAINE HYDROCHLORIDE 10 MG/ML
1 INJECTION, SOLUTION INFILTRATION; PERINEURAL
Status: DISCONTINUED | OUTPATIENT
Start: 2025-04-30 | End: 2025-04-30 | Stop reason: HOSPADM

## 2025-04-30 RX ORDER — OXYCODONE HYDROCHLORIDE 5 MG/1
5 TABLET ORAL
Status: DISCONTINUED | OUTPATIENT
Start: 2025-04-30 | End: 2025-04-30 | Stop reason: HOSPADM

## 2025-04-30 RX ORDER — PHENYLEPHRINE HYDROCHLORIDE 10 MG/ML
INJECTION INTRAVENOUS
Status: DISCONTINUED | OUTPATIENT
Start: 2025-04-30 | End: 2025-04-30 | Stop reason: SDUPTHER

## 2025-04-30 RX ORDER — LIDOCAINE HYDROCHLORIDE 20 MG/ML
INJECTION, SOLUTION EPIDURAL; INFILTRATION; INTRACAUDAL; PERINEURAL
Status: DISCONTINUED | OUTPATIENT
Start: 2025-04-30 | End: 2025-04-30 | Stop reason: SDUPTHER

## 2025-04-30 RX ORDER — FENTANYL CITRATE 50 UG/ML
100 INJECTION, SOLUTION INTRAMUSCULAR; INTRAVENOUS
Status: DISCONTINUED | OUTPATIENT
Start: 2025-04-30 | End: 2025-04-30 | Stop reason: HOSPADM

## 2025-04-30 RX ORDER — SODIUM CHLORIDE, SODIUM LACTATE, POTASSIUM CHLORIDE, CALCIUM CHLORIDE 600; 310; 30; 20 MG/100ML; MG/100ML; MG/100ML; MG/100ML
INJECTION, SOLUTION INTRAVENOUS CONTINUOUS
Status: DISCONTINUED | OUTPATIENT
Start: 2025-04-30 | End: 2025-04-30 | Stop reason: HOSPADM

## 2025-04-30 RX ORDER — DEXAMETHASONE SODIUM PHOSPHATE 10 MG/ML
INJECTION, SOLUTION INTRA-ARTICULAR; INTRALESIONAL; INTRAMUSCULAR; INTRAVENOUS; SOFT TISSUE
Status: DISCONTINUED | OUTPATIENT
Start: 2025-04-30 | End: 2025-04-30 | Stop reason: SDUPTHER

## 2025-04-30 RX ORDER — NALOXONE HYDROCHLORIDE 0.4 MG/ML
INJECTION, SOLUTION INTRAMUSCULAR; INTRAVENOUS; SUBCUTANEOUS PRN
Status: DISCONTINUED | OUTPATIENT
Start: 2025-04-30 | End: 2025-04-30 | Stop reason: HOSPADM

## 2025-04-30 RX ORDER — ACETAMINOPHEN 500 MG
1000 TABLET ORAL ONCE
Status: COMPLETED | OUTPATIENT
Start: 2025-04-30 | End: 2025-04-30

## 2025-04-30 RX ORDER — SUCCINYLCHOLINE CHLORIDE 20 MG/ML
INJECTION INTRAMUSCULAR; INTRAVENOUS
Status: DISCONTINUED | OUTPATIENT
Start: 2025-04-30 | End: 2025-04-30 | Stop reason: SDUPTHER

## 2025-04-30 RX ORDER — ONDANSETRON 2 MG/ML
4 INJECTION INTRAMUSCULAR; INTRAVENOUS ONCE
Status: COMPLETED | OUTPATIENT
Start: 2025-04-30 | End: 2025-04-30

## 2025-04-30 RX ORDER — MIDAZOLAM HYDROCHLORIDE 2 MG/2ML
2 INJECTION, SOLUTION INTRAMUSCULAR; INTRAVENOUS
Status: DISCONTINUED | OUTPATIENT
Start: 2025-04-30 | End: 2025-04-30 | Stop reason: HOSPADM

## 2025-04-30 RX ORDER — SODIUM CHLORIDE 0.9 % (FLUSH) 0.9 %
5-40 SYRINGE (ML) INJECTION EVERY 12 HOURS SCHEDULED
Status: DISCONTINUED | OUTPATIENT
Start: 2025-04-30 | End: 2025-04-30 | Stop reason: HOSPADM

## 2025-04-30 RX ORDER — ONDANSETRON 2 MG/ML
INJECTION INTRAMUSCULAR; INTRAVENOUS
Status: DISCONTINUED | OUTPATIENT
Start: 2025-04-30 | End: 2025-04-30 | Stop reason: SDUPTHER

## 2025-04-30 RX ORDER — MIDAZOLAM 1 MG/ML
INJECTION INTRAMUSCULAR; INTRAVENOUS
Status: DISCONTINUED | OUTPATIENT
Start: 2025-04-30 | End: 2025-04-30 | Stop reason: SDUPTHER

## 2025-04-30 RX ORDER — SODIUM CHLORIDE 9 MG/ML
INJECTION, SOLUTION INTRAVENOUS PRN
Status: DISCONTINUED | OUTPATIENT
Start: 2025-04-30 | End: 2025-04-30 | Stop reason: HOSPADM

## 2025-04-30 RX ORDER — SODIUM CHLORIDE 0.9 % (FLUSH) 0.9 %
5-40 SYRINGE (ML) INJECTION PRN
Status: DISCONTINUED | OUTPATIENT
Start: 2025-04-30 | End: 2025-04-30 | Stop reason: HOSPADM

## 2025-04-30 RX ADMIN — Medication 2000 MG: at 10:13

## 2025-04-30 RX ADMIN — Medication 160 MG: at 10:07

## 2025-04-30 RX ADMIN — ACETAMINOPHEN 1000 MG: 500 TABLET, FILM COATED ORAL at 09:04

## 2025-04-30 RX ADMIN — ONDANSETRON 4 MG: 2 INJECTION INTRAMUSCULAR; INTRAVENOUS at 10:12

## 2025-04-30 RX ADMIN — PROPOFOL 200 MG: 10 INJECTION, EMULSION INTRAVENOUS at 10:07

## 2025-04-30 RX ADMIN — FENTANYL CITRATE 50 MCG: 50 INJECTION, SOLUTION INTRAMUSCULAR; INTRAVENOUS at 10:06

## 2025-04-30 RX ADMIN — DEXMEDETOMIDINE 8 MCG: 100 INJECTION, SOLUTION, CONCENTRATE INTRAVENOUS at 10:40

## 2025-04-30 RX ADMIN — DEXAMETHASONE SODIUM PHOSPHATE 4 MG: 10 INJECTION INTRAMUSCULAR; INTRAVENOUS at 10:12

## 2025-04-30 RX ADMIN — LIDOCAINE HYDROCHLORIDE 80 MG: 20 INJECTION, SOLUTION EPIDURAL; INFILTRATION; INTRACAUDAL; PERINEURAL at 10:07

## 2025-04-30 RX ADMIN — FENTANYL CITRATE 50 MCG: 50 INJECTION, SOLUTION INTRAMUSCULAR; INTRAVENOUS at 10:07

## 2025-04-30 RX ADMIN — PHENYLEPHRINE HYDROCHLORIDE 50 MCG: 10 INJECTION INTRAVENOUS at 10:20

## 2025-04-30 RX ADMIN — SODIUM CHLORIDE, SODIUM LACTATE, POTASSIUM CHLORIDE, AND CALCIUM CHLORIDE: .6; .31; .03; .02 INJECTION, SOLUTION INTRAVENOUS at 09:15

## 2025-04-30 RX ADMIN — ONDANSETRON 4 MG: 2 INJECTION, SOLUTION INTRAMUSCULAR; INTRAVENOUS at 11:53

## 2025-04-30 RX ADMIN — MIDAZOLAM 2 MG: 1 INJECTION INTRAMUSCULAR; INTRAVENOUS at 10:02

## 2025-04-30 ASSESSMENT — PAIN - FUNCTIONAL ASSESSMENT
PAIN_FUNCTIONAL_ASSESSMENT: NONE - DENIES PAIN
PAIN_FUNCTIONAL_ASSESSMENT: 0-10

## 2025-04-30 NOTE — ANESTHESIA PRE PROCEDURE
treatmentdepression/anxiety             GI/Hepatic/Renal: Neg GI/Hepatic/Renal ROS            Endo/Other:    (+) DiabetesType II DM, well controlled, using insulin.                 Abdominal:             Vascular:          Other Findings:       Anesthesia Plan      general     ASA 2     (GETA)  Induction: intravenous.    MIPS: Prophylactic antiemetics administered.  Anesthetic plan and risks discussed with patient.    Use of blood products discussed with patient whom consented to blood products.                  Bethel Lay MD   4/30/2025

## 2025-04-30 NOTE — BRIEF OP NOTE
Brief Postoperative Note      Patient: Flor Marquis  YOB: 1990  MRN: [id number]    Date of Procedure: 4/30/2025    Pre-Op Diagnosis Codes:      * Ureteral stone [N20.1] (bilateral)    Post-Op Diagnosis: Same       Procedure(s):  CYSTOSCOPY BILATERAL URETEROSCOPY LASER LITHOTRIPSY BILATERAL STENT PLACEMENT    Surgeon(s):  Tavares Mitchell MD    Assistant:  * No surgical staff found *    Anesthesia: General    Estimated Blood Loss (mL): Minimal    Complications: None    Specimens:   * No specimens in log *    Implants:  Implant Name Type Inv. Item Serial No.  Lot No. LRB No. Used Action   STENT URET 4.8FR L24CM PERCFLX + HYDR+ FIRM DUROMETER DBL - [id number]  STENT URET 4.8FR L24CM PERCFLX + HYDR+ FIRM DUROMETER DBL  [employer]- [id number] Right 1 Implanted   STENT URETERAL 6 FRX24 CM SOFT MONOFILAMENT TRIA - [id number]  STENT URETERAL 6 FRX24 CM SOFT MONOFILAMENT TRIA  [employer]-WD [id number] Left 1 Implanted         Drains: * No LDAs found *    Findings:  Infection Present At Time Of Surgery (PATOS) (choose all levels that have infection present):  No infection present  Other Findings: see dictation    Electronically signed by TAVARES MITCHELL MD on 4/30/2025 at 10:57 AM

## 2025-04-30 NOTE — ANESTHESIA PROCEDURE NOTES
Airway  Date/Time: 4/30/2025 10:09 AM  Urgency: elective    Airway not difficult    General Information and Staff    Patient location during procedure: OR  Resident/CRNA: Terwilliger, Chelsea, APRN - CRNA  Performed: resident/CRNA   Performed by: Terwilliger, Chelsea, APRN - CRNA  Authorized by: Bethel Lay MD      Indications and Patient Condition  Indications for airway management: anesthesia  Spontaneous Ventilation: absent  Sedation level: deep  Preoxygenated: yes  Patient position: sniffing  MILS not maintained throughout  Mask difficulty assessment: not attempted    Final Airway Details  Final airway type: endotracheal airway      Successful airway: ETT  Cuffed: yes   Successful intubation technique: video laryngoscopy  Facilitating devices/methods: intubating stylet  Endotracheal tube insertion site: oral  Blade: Fairchild  Blade size: #3  ETT size (mm): 7.0  Cormack-Lehane Classification: grade I - full view of glottis  Placement verified by: chest auscultation and capnometry   Measured from: lips  ETT to lips (cm): 22  Number of attempts at approach: 1  Ventilation between attempts: bag mask    Additional Comments  Atraumatic insertion, lips and teeth as preeval.

## 2025-04-30 NOTE — ANESTHESIA POSTPROCEDURE EVALUATION
Department of Anesthesiology  Postprocedure Note    Patient: Flor Marquis  MRN: [id number]  YOB: 1990  Date of evaluation: 4/30/2025    Procedure Summary       Date: 04/30/25 Room / Location: [facility] MAIN OR 02 / [facility] MAIN OR    Anesthesia Start: 1000 Anesthesia Stop: 1105    Procedure: CYSTOSCOPY BILATERAL URETEROSCOPY LASER LITHOTRIPSY BILATERAL STENT PLACEMENT (Bilateral: Ureter) Diagnosis:       Ureteral stone      (Ureteral stone [N20.1])    Providers: Nir Mitchell MD Responsible Provider: Bethel Lay MD    Anesthesia Type: General ASA Status: 2            Anesthesia Type: General    Jo-Ann Phase I: Jo-Ann Score: 8    Jo-Ann Phase II:      Anesthesia Post Evaluation    Patient location during evaluation: PACU  Patient participation: complete - patient participated  Level of consciousness: awake and alert  Pain score: 2  Airway patency: patent  Nausea & Vomiting: no nausea  Cardiovascular status: blood pressure returned to baseline and hemodynamically stable  Respiratory status: acceptable  Hydration status: euvolemic  Multimodal analgesia pain management approach  Pain management: adequate and satisfactory to patient    No notable events documented.

## 2025-04-30 NOTE — DISCHARGE INSTRUCTIONS
If you have had surgery in the past 7-10 days by one of our providers and are having fever, bleeding, or drainage from an incision, have an opening in an incision, or having issues urinating properly, please call [phone] during normal office hours. Please call [phone] for any immediate needs AFTER HOURS.     Ureteral Stent Placement: What to Expect at Home  Your Recovery  A ureteral (say \"you-REE-ter-ul\") stent is a thin, hollow tube that is placed in the ureter to help urine pass from the kidney into the bladder. Ureters are the tubes that connect the kidneys to the bladder.  You may have a small amount of blood in your urine for 1 to 3 days after the procedure.  While the stent is in place, you may have to urinate more often, feel a sudden need to urinate, or feel like you cannot completely empty your bladder. You may feel some pain when you urinate or do strenuous activity. You also may notice a small amount of blood in your urine after strenuous activities. These side effects usually do not prevent people from doing their normal daily activities. You may have a string attached to your stent. Do not to pull the string unless the doctor tells you to. The doctor will use the string to pull out the stent when you no longer need it.  After the procedure, urine may flow better from your kidneys to your bladder. A ureteral stent may be left in place for several days or for as long as several months. Your doctor will take it out when you no longer need it.    Cystoscopy: What to Expect at Home  Your Recovery  A cystoscopy is a procedure that lets a doctor look inside of the bladder and the urethra. The urethra is the tube that carries urine from the bladder to outside the body. The doctor uses a thin, lighted tube called a cystoscope to look for kidney or bladder stones, tumors, bleeding, or infection. After the cystoscopy, your urethra may be sore at first, and it may burn when you urinate for the first few

## 2025-04-30 NOTE — INTERVAL H&P NOTE
Update History & Physical    The patient's History and Physical of April 22, 2025 was reviewed with the patient and I examined the patient. CT revealed B ureteral stones and she presents for cystoscopy, bilateral ureteroscopy, laser lithotripsy, bilateral ureteral stent placement (CV:RRR, LUNGS:CTAB). The surgical site was confirmed by the patient and me.     Plan: The risks, benefits, expected outcome, and alternative to the recommended procedure have been discussed with the patient. Patient understands and wants to proceed with the procedure.     Electronically signed by TAVARES AHMADI MD on 4/30/2025 at 8:29 AM

## 2025-04-30 NOTE — OP NOTE
[address]  [address]                            OPERATIVE REPORT      PATIENT NAME: TAYLOR BARAHONA          : 1990  MED REC NO: [id number]                       ROOM: [facility] NO: [id number]                       ADMIT DATE: 2025  PROVIDER: Nir Mitchell MD    DATE OF SERVICE:  2025    PREOPERATIVE DIAGNOSES:  Bilateral ureteral calculi.    POSTOPERATIVE DIAGNOSES:  Bilateral ureteral calculi.    PROCEDURES PERFORMED:  Cystourethroscopy, bilateral ureteroscopy, bilateral laser lithotripsy, and bilateral ureteral stent placement.    SURGEON:  Nir Mitchell MD    ASSISTANT:  None.    ANESTHESIA:  General.    ESTIMATED BLOOD LOSS:  Minimal.    SPECIMENS REMOVED:  None.    INTRAOPERATIVE FINDINGS:  I was able to reach all four stones and fragment them today.     COMPLICATIONS:  None apparent.    IMPLANTS:  Bilateral double-J ureteral stents.    INDICATIONS:  This is a 35-year-old female, who was found by recent CAT scan to have two stones in the left ureter, one stone in the proximal right ureter, and one stone in the right kidney.  After discussion of risks versus benefits, she has decided to move forward with the above-named procedure.    DESCRIPTION OF PROCEDURE:  The patient was taken to the operating room, placed in the supine position.  Anesthesia was induced via anesthesiology service.  She was repositioned in the lithotomy position, and prepped and draped in sterile surgical fashion with the genitalia in the sterile field.  A 22-Afghan cystoscope was introduced atraumatically via the urethra and pan-urethroscopy and cystoscopy revealed no pathology.  I started on the patient's right side and cannulated this with a Sensor wire and advanced until it curled in the renal pelvis.  The bladder was emptied and the cystoscope was removed.    An 8/10 coaxial dilator sheath set was used to dilate the right

## 2025-05-06 ENCOUNTER — OFFICE VISIT (OUTPATIENT)
Dept: UROLOGY | Age: 35
End: 2025-05-06
Payer: COMMERCIAL

## 2025-05-06 DIAGNOSIS — N20.1 BILATERAL URETERAL CALCULI: ICD-10-CM

## 2025-05-06 DIAGNOSIS — N20.0 KIDNEY STONE: Primary | ICD-10-CM

## 2025-05-06 DIAGNOSIS — N20.0 KIDNEY STONE: ICD-10-CM

## 2025-05-06 LAB
BILIRUBIN, URINE, POC: ABNORMAL
BLOOD URINE, POC: ABNORMAL
GLUCOSE URINE, POC: NEGATIVE MG/DL
KETONES, URINE, POC: NEGATIVE MG/DL
LEUKOCYTE ESTERASE, URINE, POC: ABNORMAL
NITRITE, URINE, POC: POSITIVE
PH, URINE, POC: 5.5 (ref 4.6–8)
PROTEIN,URINE, POC: 300 MG/DL
SPECIFIC GRAVITY, URINE, POC: 1.02 (ref 1–1.03)
URINALYSIS CLARITY, POC: ABNORMAL
URINALYSIS COLOR, POC: ABNORMAL
UROBILINOGEN, POC: ABNORMAL MG/DL

## 2025-05-06 PROCEDURE — 99214 OFFICE O/P EST MOD 30 MIN: CPT | Performed by: NURSE PRACTITIONER

## 2025-05-06 PROCEDURE — 74018 RADEX ABDOMEN 1 VIEW: CPT | Performed by: NURSE PRACTITIONER

## 2025-05-06 PROCEDURE — 81003 URINALYSIS AUTO W/O SCOPE: CPT | Performed by: NURSE PRACTITIONER

## 2025-05-06 ASSESSMENT — ENCOUNTER SYMPTOMS
NAUSEA: 0
BACK PAIN: 1

## 2025-05-06 NOTE — PROGRESS NOTES
[facility]  [address] [place] [id number]  [phone]          Flor Marquis  : 1990    Chief Complaint   Patient presents with    Follow-up          HPI     Flor Marquis is a 35 y.o. female who has h/o kidney stones and pyelonephritis. She underwent L ESWL in .      Referred back last week for microhematuria and kidney stone. Seen at urgent care for GI sx. KUB showed RUP stone. UA w hematuria. She reports occ R sided pain. No gross hematuria.     CT a/p (25) showed 3.7 mm midpole R kidney. 5.6 mm stone in proximal R ureter. Mild hydronephrosis. 6.1 mm stone mid L ureter. 5.8 mm stone L ureter. L hydronephrosis.     S/P cystourethroscopy, bilateral ureteroscopy, bilateral laser lithotripsy, and bilateral ureteral stent placement 25.     Here for follow up and stent removal.       Past Medical History:   Diagnosis Date    Anemia     in pregnancy- pt denies any recent blood or iron infusions    COVID-19 vaccine series declined     Depression, postpartum 07/10/2023    History of gestational diabetes 2016    History of kidney stones     lithotripsy completed    Infertility associated with anovulation     Irregular menstrual bleeding     Nephrolithiasis     PCOS (polycystic ovarian syndrome)     Poor dentition     Type 2 diabetes mellitus (HCC)     no oral or insulin agents at this time/AVG FBS:120/ s.s of hypoglycemia at 75, Last A1c:5.7 per pt    Ureteral stone     surgery scheduled on 25     Past Surgical History:   Procedure Laterality Date    CERVICAL POLYP REMOVAL  2022     SECTION       SECTION N/A 2023     SECTION performed by Jennifer Albarran MD at [facility] L&D    CYSTOSCOPY Bilateral 2025    CYSTOSCOPY BILATERAL URETEROSCOPY LASER LITHOTRIPSY BILATERAL STENT PLACEMENT performed by Nir Mitchell MD at [facility] MAIN OR    DILATION AND CURETTAGE OF UTERUS N/A 2022    DILATATION AND

## 2025-05-08 LAB
BACTERIA SPEC CULT: NORMAL
SERVICE CMNT-IMP: NORMAL

## 2025-05-09 ENCOUNTER — RESULTS FOLLOW-UP (OUTPATIENT)
Dept: UROLOGY | Age: 35
End: 2025-05-09

## 2025-06-10 ENCOUNTER — OFFICE VISIT (OUTPATIENT)
Dept: UROLOGY | Age: 35
End: 2025-06-10
Payer: COMMERCIAL

## 2025-06-10 DIAGNOSIS — N20.0 KIDNEY STONE: ICD-10-CM

## 2025-06-10 DIAGNOSIS — N20.0 KIDNEY STONE: Primary | ICD-10-CM

## 2025-06-10 DIAGNOSIS — N20.1 BILATERAL URETERAL CALCULI: ICD-10-CM

## 2025-06-10 LAB
ANION GAP SERPL CALC-SCNC: 11 MMOL/L (ref 7–16)
BILIRUBIN, URINE, POC: NEGATIVE
BLOOD URINE, POC: NORMAL
BUN SERPL-MCNC: 13 MG/DL (ref 6–23)
CALCIUM SERPL-MCNC: 10.4 MG/DL (ref 8.8–10.2)
CALCIUM SERPL-MCNC: 10.6 MG/DL (ref 8.8–10.2)
CHLORIDE SERPL-SCNC: 107 MMOL/L (ref 98–107)
CO2 SERPL-SCNC: 24 MMOL/L (ref 20–29)
CREAT SERPL-MCNC: 0.64 MG/DL (ref 0.6–1.1)
GLUCOSE SERPL-MCNC: 96 MG/DL (ref 70–99)
GLUCOSE URINE, POC: NEGATIVE MG/DL
KETONES, URINE, POC: NEGATIVE MG/DL
LEUKOCYTE ESTERASE, URINE, POC: NEGATIVE
NITRITE, URINE, POC: NEGATIVE
PH, URINE, POC: 5.5 (ref 4.6–8)
POTASSIUM SERPL-SCNC: 4.2 MMOL/L (ref 3.5–5.1)
PROTEIN,URINE, POC: NEGATIVE MG/DL
PTH-INTACT SERPL-MCNC: 39.1 PG/ML (ref 15–65)
SODIUM SERPL-SCNC: 142 MMOL/L (ref 136–145)
SPECIFIC GRAVITY, URINE, POC: 1.03 (ref 1–1.03)
URATE SERPL-MCNC: 5 MG/DL (ref 2.5–7.1)
URINALYSIS CLARITY, POC: NORMAL
URINALYSIS COLOR, POC: NORMAL
UROBILINOGEN, POC: NORMAL MG/DL

## 2025-06-10 PROCEDURE — 74018 RADEX ABDOMEN 1 VIEW: CPT | Performed by: NURSE PRACTITIONER

## 2025-06-10 PROCEDURE — 99214 OFFICE O/P EST MOD 30 MIN: CPT | Performed by: NURSE PRACTITIONER

## 2025-06-10 PROCEDURE — 81003 URINALYSIS AUTO W/O SCOPE: CPT | Performed by: NURSE PRACTITIONER

## 2025-06-10 RX ORDER — HYDROCODONE BITARTRATE AND ACETAMINOPHEN 5; 325 MG/1; MG/1
1 TABLET ORAL EVERY 6 HOURS PRN
Qty: 12 TABLET | Refills: 0 | Status: SHIPPED | OUTPATIENT
Start: 2025-06-10 | End: 2025-06-13

## 2025-06-10 RX ORDER — ONDANSETRON 4 MG/1
4 TABLET, ORALLY DISINTEGRATING ORAL 3 TIMES DAILY PRN
Qty: 21 TABLET | Refills: 0 | Status: SHIPPED | OUTPATIENT
Start: 2025-06-10

## 2025-06-10 RX ORDER — TAMSULOSIN HYDROCHLORIDE 0.4 MG/1
0.4 CAPSULE ORAL DAILY PRN
Qty: 30 CAPSULE | Refills: 0 | Status: SHIPPED | OUTPATIENT
Start: 2025-06-10

## 2025-06-10 ASSESSMENT — ENCOUNTER SYMPTOMS
BACK PAIN: 0
NAUSEA: 0

## 2025-06-10 NOTE — PROGRESS NOTES
stone fragments (around 2.5 mm each). No L sided stones.       Assessment and Plan    ICD-10-CM    1. Kidney stone  N20.0 AMB POC URINALYSIS DIP STICK AUTO W/O MICRO     AMB POC XRAY ABDOMEN 1 VIEW     Kidney Stone, Urine/Saturation     Basic Metabolic Panel     PTH, Intact     Uric Acid     HYDROcodone-acetaminophen (NORCO) 5-325 MG per tablet      2. Bilateral ureteral calculi  N20.1           Kidney stone- metabolic work up. Flomax 0.4 mg PO daily PRN kidney stone. Zofran 4 mg PO TID PRN n/v. Norco 5 mg PO q 6 hr PRN pain.    B ureteral stone- KUB reviewed. No add tx.    RTO in 6 months for OV with KUB. Advised to contact office sooner if needed.    Amanda Tam, BECKY - CNP  Dr. Mcclure is supervising physician today and he approves plan of care.

## 2025-07-24 ENCOUNTER — PATIENT MESSAGE (OUTPATIENT)
Dept: ENDOCRINOLOGY | Age: 35
End: 2025-07-24

## 2025-07-28 NOTE — TELEPHONE ENCOUNTER
Gilbert response:    Hi,    It looks like you've been getting treatment for kidney stones for many of the last few months since I saw you last.    Have you determined what sharp pain was from kidney stones and what might have been from this medication?    Other meds like this can have similar side effects like you described with the egg-odor burps.     Please read below and see if there are things you can change and consider restarting ozempic.     If not, we can discuss alternate medication at our upcoming visit    ~Aydee    Ozempic works in several places. One of which is slowing down the gut to increase feelings of fullness    There are some foods that trigger the side effects you mentioned including alcohol, caffeine, and heavy foods that are harder to digest like fried/fatty/rich/creamy foods.     Eating smaller, lighter meals and staying well hydrated can help. If you stop eating as soon as you feel full, you may notice these side effects are less likely to occur.     You can support your digestion with good bacteria (probiotics), fiber, and water.    Do you think there is room to adjust anything above?    If you start vomiting or have severe abdominal pain we should stop the medicine. If you cannot tolerate the side effects we can talk about trying a lower dose.

## 2025-08-29 ENCOUNTER — TELEPHONE (OUTPATIENT)
Dept: ENDOCRINOLOGY | Age: 35
End: 2025-08-29

## (undated) DEVICE — ELECTRODE PT RET AD L9FT HI MOIST COND ADH HYDRGEL CORDED

## (undated) DEVICE — GARMENT,MEDLINE,DVT,INT,CALF,MED, GEN2: Brand: MEDLINE

## (undated) DEVICE — SURGICAL PROCEDURE PACK C SECT CDS

## (undated) DEVICE — TUBING, SUCTION, 1/4" X 10', STRAIGHT: Brand: MEDLINE

## (undated) DEVICE — STERILE LATEX POWDER FREE SURGICAL GLOVES WITH HYDROGEL COATING: Brand: PROTEXIS

## (undated) DEVICE — SUTURE ABSRB X-1 REV CUT 1/2 CIR 22MM UD BRAID 27IN SZ 3-0 J458H

## (undated) DEVICE — SOLUTION IRRIG 3000ML H2O STRL BAG

## (undated) DEVICE — DILATOR/SHEATH SET: Brand: 8/10 DILATOR/SHEATH SET

## (undated) DEVICE — SOLUTION IRRIG 1000ML H2O STRL BLT

## (undated) DEVICE — KIT URIN CATH L16FR BLLN 5ML INDWL STR TIP INF CTRL URIN

## (undated) DEVICE — GLOVE SURG SZ 65 THK91MIL LTX FREE SYN POLYISOPRENE

## (undated) DEVICE — GLOVE SURG SZ 65 L12IN FNGR THK79MIL GRN LTX FREE

## (undated) DEVICE — Device

## (undated) DEVICE — SOLUTION IRRIG 3000ML 0.9% SOD CHL USP UROMATIC PLAS CONT

## (undated) DEVICE — STERILE POLYISOPRENE POWDER-FREE SURGICAL GLOVES: Brand: PROTEXIS

## (undated) DEVICE — KENDALL SCD EXPRESS SLEEVES, KNEE LENGTH, MEDIUM: Brand: KENDALL SCD

## (undated) DEVICE — SUTURE PLN GUT SZ 2-0 L27IN ABSRB YELLOWISH TAN L40MM CT 853H

## (undated) DEVICE — CARDINAL HEALTH FLEXIBLE LIGHT HANDLE COVER: Brand: CARDINAL HEALTH

## (undated) DEVICE — DRAPE,UNDERBUTTOCKS,PCH,STERILE: Brand: MEDLINE

## (undated) DEVICE — MINOR LITHOTOMY PACK: Brand: MEDLINE INDUSTRIES, INC.

## (undated) DEVICE — PENCIL ES L3M BTTN SWCH S STL HEX LOK BLDE ELECTRD HOLSTER

## (undated) DEVICE — SOLUTION IV 1000ML 0.9% SOD CHL

## (undated) DEVICE — GOWN,REINFORCED,POLY,AURORA,XXLARGE,STR: Brand: MEDLINE

## (undated) DEVICE — CANISTER, RIGID, 2000CC: Brand: MEDLINE INDUSTRIES, INC.

## (undated) DEVICE — SUTURE CHROMIC GUT SZ 0 L27IN ABSRB BRN L36MM CT-1 1/2 CIR 812H

## (undated) DEVICE — APPLICATOR MEDICATED 26 CC SOLUTION HI LT ORNG CHLORAPREP

## (undated) DEVICE — SUTURE VICRYL SZ 1 L27IN ABSRB UD CT-1 L36MM 1/2 CIR J261H

## (undated) DEVICE — SUTURE PDS II SZ 0 L27IN ABSRB VLT L36MM CT-1 1/2 CIR Z340H

## (undated) DEVICE — NITINOL WIRE WITH HYDROPHILIC TIP: Brand: SENSOR

## (undated) DEVICE — SINGLE-USE DIGITAL FLEXIBLE URETEROSCOPE: Brand: LITHOVUE

## (undated) DEVICE — SUTURE CHROMIC GUT SZ 1 L27IN ABSRB BRN L36MM CT-1 1/2 CIR 813H